# Patient Record
Sex: FEMALE | Race: WHITE | NOT HISPANIC OR LATINO | Employment: FULL TIME | URBAN - METROPOLITAN AREA
[De-identification: names, ages, dates, MRNs, and addresses within clinical notes are randomized per-mention and may not be internally consistent; named-entity substitution may affect disease eponyms.]

---

## 2017-01-12 DIAGNOSIS — Z48.298 AFTERCARE FOLLOWING ORGAN TRANSPLANT: ICD-10-CM

## 2017-01-12 DIAGNOSIS — Z79.899 ENCOUNTER FOR LONG-TERM CURRENT USE OF MEDICATION: ICD-10-CM

## 2017-01-12 DIAGNOSIS — Z94.0 KIDNEY REPLACED BY TRANSPLANT: ICD-10-CM

## 2017-01-12 LAB
ANION GAP SERPL CALCULATED.3IONS-SCNC: 6 MMOL/L (ref 3–14)
BUN SERPL-MCNC: 19 MG/DL (ref 7–30)
CALCIUM SERPL-MCNC: 9.6 MG/DL (ref 8.5–10.1)
CHLORIDE SERPL-SCNC: 108 MMOL/L (ref 94–109)
CO2 SERPL-SCNC: 26 MMOL/L (ref 20–32)
CREAT SERPL-MCNC: 1.13 MG/DL (ref 0.52–1.04)
CREAT UR-MCNC: 101 MG/DL
ERYTHROCYTE [DISTWIDTH] IN BLOOD BY AUTOMATED COUNT: 12.2 % (ref 10–15)
GFR SERPL CREATININE-BSD FRML MDRD: 49 ML/MIN/1.7M2
GLUCOSE SERPL-MCNC: 151 MG/DL (ref 70–99)
HCT VFR BLD AUTO: 42.5 % (ref 35–47)
HGB BLD-MCNC: 14.3 G/DL (ref 11.7–15.7)
MCH RBC QN AUTO: 33.9 PG (ref 26.5–33)
MCHC RBC AUTO-ENTMCNC: 33.6 G/DL (ref 31.5–36.5)
MCV RBC AUTO: 101 FL (ref 78–100)
PLATELET # BLD AUTO: 211 10E9/L (ref 150–450)
POTASSIUM SERPL-SCNC: 4.5 MMOL/L (ref 3.4–5.3)
PROT UR-MCNC: 0.15 G/L
PROT/CREAT 24H UR: 0.15 G/G CR (ref 0–0.2)
RBC # BLD AUTO: 4.22 10E12/L (ref 3.8–5.2)
SODIUM SERPL-SCNC: 140 MMOL/L (ref 133–144)
TACROLIMUS BLD-MCNC: 5.1 UG/L (ref 5–15)
TME LAST DOSE: 2300 H
WBC # BLD AUTO: 6.5 10E9/L (ref 4–11)

## 2017-01-12 PROCEDURE — 84156 ASSAY OF PROTEIN URINE: CPT | Performed by: INTERNAL MEDICINE

## 2017-01-12 PROCEDURE — 80197 ASSAY OF TACROLIMUS: CPT | Performed by: INTERNAL MEDICINE

## 2017-01-12 PROCEDURE — 36415 COLL VENOUS BLD VENIPUNCTURE: CPT | Performed by: INTERNAL MEDICINE

## 2017-01-12 PROCEDURE — 80048 BASIC METABOLIC PNL TOTAL CA: CPT | Performed by: INTERNAL MEDICINE

## 2017-01-12 PROCEDURE — 85027 COMPLETE CBC AUTOMATED: CPT | Performed by: INTERNAL MEDICINE

## 2017-01-17 DIAGNOSIS — Z94.0 KIDNEY REPLACED BY TRANSPLANT: ICD-10-CM

## 2017-01-17 DIAGNOSIS — Z94.0 KIDNEY TRANSPLANTED: Primary | ICD-10-CM

## 2017-01-17 RX ORDER — TACROLIMUS 0.5 MG/1
0.5 CAPSULE, GELATIN COATED ORAL 2 TIMES DAILY
Qty: 60 CAPSULE | Refills: 6 | Status: SHIPPED | OUTPATIENT
Start: 2017-01-17 | End: 2018-05-21

## 2017-01-17 RX ORDER — TACROLIMUS 1 MG/1
1 CAPSULE, GELATIN COATED ORAL 2 TIMES DAILY
Qty: 60 CAPSULE | Refills: 6 | Status: SHIPPED | OUTPATIENT
Start: 2017-01-17 | End: 2018-05-21

## 2017-01-17 RX ORDER — MYCOPHENOLATE MOFETIL 250 MG/1
500 CAPSULE ORAL 2 TIMES DAILY
Qty: 120 CAPSULE | Refills: 6 | Status: SHIPPED | OUTPATIENT
Start: 2017-01-17 | End: 2018-05-21

## 2017-02-23 ENCOUNTER — TELEPHONE (OUTPATIENT)
Dept: NEPHROLOGY | Facility: CLINIC | Age: 62
End: 2017-02-23

## 2017-03-23 ENCOUNTER — TELEPHONE (OUTPATIENT)
Dept: NEPHROLOGY | Facility: CLINIC | Age: 62
End: 2017-03-23

## 2017-03-23 NOTE — TELEPHONE ENCOUNTER
Ciara, this is   office from [facility] at the [facility]. We are calling to remind you of your upcoming Nephrology appointment on 4/7/17 at 740am. Please arrive about 1 hours prior to your appointment time for labs. Also, please bring an updated medication list or your labeled medication bottles with you to your appointment. If you have any questions or would like to cancel or reschedule your appointment, please call us at [phone].     You are welcome to have your labs done up to a week before your appointment at any [place] or [facility] facility. If you have your labs completed before your appointment, please still come 30 minutes early for check-in  DAVID CHÁVEZ CMA

## 2017-04-06 DIAGNOSIS — N18.9 CHRONIC KIDNEY DISEASE, UNSPECIFIED: ICD-10-CM

## 2017-04-06 RX ORDER — SIMVASTATIN 20 MG
20 TABLET ORAL EVERY EVENING
Qty: 90 TABLET | Refills: 0 | Status: SHIPPED | OUTPATIENT
Start: 2017-04-06 | End: 2017-10-31

## 2017-04-06 NOTE — TELEPHONE ENCOUNTER
Drug: Simvastatin  Last Fill Date: 2/22/2017  Quantity: 90    [facility]ika [facility]pe [facility]  Phone: [phone]

## 2017-04-06 NOTE — TELEPHONE ENCOUNTER
Last Office Visit with Nephrologist:  3/10/13. Follow up scheduled tomorrow.  Medication refilled per Nephrology Clinic protocol.     Lara Murphy RN

## 2017-04-07 ENCOUNTER — OFFICE VISIT (OUTPATIENT)
Dept: NEPHROLOGY | Facility: CLINIC | Age: 62
End: 2017-04-07
Attending: INTERNAL MEDICINE
Payer: COMMERCIAL

## 2017-04-07 VITALS
WEIGHT: 200.7 LBS | OXYGEN SATURATION: 96 % | DIASTOLIC BLOOD PRESSURE: 93 MMHG | TEMPERATURE: 94.3 F | SYSTOLIC BLOOD PRESSURE: 134 MMHG | HEART RATE: 54 BPM | BODY MASS INDEX: 31.5 KG/M2 | HEIGHT: 67 IN

## 2017-04-07 DIAGNOSIS — Z94.0 KIDNEY REPLACED BY TRANSPLANT: ICD-10-CM

## 2017-04-07 DIAGNOSIS — N18.30 CHRONIC KIDNEY DISEASE, STAGE 3 (MODERATE): Primary | ICD-10-CM

## 2017-04-07 DIAGNOSIS — Z79.899 ENCOUNTER FOR LONG-TERM CURRENT USE OF MEDICATION: ICD-10-CM

## 2017-04-07 DIAGNOSIS — Z94.0 LIVING-DONOR KIDNEY TRANSPLANT RECIPIENT: ICD-10-CM

## 2017-04-07 DIAGNOSIS — I10 ESSENTIAL HYPERTENSION: ICD-10-CM

## 2017-04-07 DIAGNOSIS — Z48.298 AFTERCARE FOLLOWING ORGAN TRANSPLANT: ICD-10-CM

## 2017-04-07 LAB
ANION GAP SERPL CALCULATED.3IONS-SCNC: 7 MMOL/L (ref 3–14)
BUN SERPL-MCNC: 18 MG/DL (ref 7–30)
CALCIUM SERPL-MCNC: 8.9 MG/DL (ref 8.5–10.1)
CHLORIDE SERPL-SCNC: 108 MMOL/L (ref 94–109)
CO2 SERPL-SCNC: 25 MMOL/L (ref 20–32)
CREAT SERPL-MCNC: 1.1 MG/DL (ref 0.52–1.04)
ERYTHROCYTE [DISTWIDTH] IN BLOOD BY AUTOMATED COUNT: 12.2 % (ref 10–15)
GFR SERPL CREATININE-BSD FRML MDRD: 50 ML/MIN/1.7M2
GLUCOSE SERPL-MCNC: 189 MG/DL (ref 70–99)
HCT VFR BLD AUTO: 40.9 % (ref 35–47)
HGB BLD-MCNC: 13.9 G/DL (ref 11.7–15.7)
MCH RBC QN AUTO: 34 PG (ref 26.5–33)
MCHC RBC AUTO-ENTMCNC: 34 G/DL (ref 31.5–36.5)
MCV RBC AUTO: 100 FL (ref 78–100)
PLATELET # BLD AUTO: 194 10E9/L (ref 150–450)
POTASSIUM SERPL-SCNC: 4.3 MMOL/L (ref 3.4–5.3)
RBC # BLD AUTO: 4.09 10E12/L (ref 3.8–5.2)
SODIUM SERPL-SCNC: 141 MMOL/L (ref 133–144)
TACROLIMUS BLD-MCNC: 5.5 UG/L (ref 5–15)
TME LAST DOSE: NORMAL H
URATE SERPL-MCNC: 7.1 MG/DL (ref 2.6–6)
WBC # BLD AUTO: 6 10E9/L (ref 4–11)

## 2017-04-07 PROCEDURE — 80197 ASSAY OF TACROLIMUS: CPT | Performed by: INTERNAL MEDICINE

## 2017-04-07 PROCEDURE — 99213 OFFICE O/P EST LOW 20 MIN: CPT | Mod: ZF

## 2017-04-07 PROCEDURE — 80048 BASIC METABOLIC PNL TOTAL CA: CPT | Performed by: INTERNAL MEDICINE

## 2017-04-07 PROCEDURE — 85027 COMPLETE CBC AUTOMATED: CPT | Performed by: INTERNAL MEDICINE

## 2017-04-07 PROCEDURE — 84550 ASSAY OF BLOOD/URIC ACID: CPT | Performed by: INTERNAL MEDICINE

## 2017-04-07 PROCEDURE — 36415 COLL VENOUS BLD VENIPUNCTURE: CPT | Performed by: INTERNAL MEDICINE

## 2017-04-07 RX ORDER — HYDROCHLOROTHIAZIDE 12.5 MG/1
12.5 CAPSULE ORAL DAILY
Qty: 30 CAPSULE | Refills: 11 | Status: SHIPPED | OUTPATIENT
Start: 2017-04-07 | End: 2020-12-07

## 2017-04-07 NOTE — PATIENT INSTRUCTIONS
Start HCTZ 12.5 mg daily for BP. Have blood pressure checked within one month to be sure less than 140/90. See Dr. Marie for diabetes. I would recommend starting metformin (your kidney function is good enough). Consider repeating bone scan.  Check blood test AT LEAST EVERY SIX MONTHS, prefer every four months.

## 2017-04-07 NOTE — MR AVS SNAPSHOT
After Visit Summary   4/7/2017    Eliana Elder    MRN: [id number]           Patient Information     Date Of Birth          1955        Visit Information        Provider Department      4/7/2017 7:40 AM Angel Ruiz MD [facility]        Today's Diagnoses     Chronic kidney disease, stage 3 (moderate)    -  1    Essential hypertension        Living-donor kidney transplant recipient          Care Instructions    Start HCTZ 12.5 mg daily for BP. Have blood pressure checked within one month to be sure less than 140/90. See Dr. Marie for diabetes. I would recommend starting metformin (your kidney function is good enough). Consider repeating bone scan.  Check blood test AT LEAST EVERY SIX MONTHS, prefer every four months.        Follow-ups after your visit        Follow-up notes from your care team     Return in about 1 year (around 4/7/2018).      Your next 10 appointments already scheduled     Mar 30, 2018  6:45 AM CDT   Lab with  LAB   [facility] ([facility])    [address] [place] [address]  [facility] [address] [phone]            Mar 30, 2018  7:40 AM CDT   (Arrive by 7:10 AM)   Return Kidney Transplant with Angel Ruiz MD   [facility] ([facility])    [address] [place] [address] [phone]              Who to contact     If you have questions or need follow up information about today's clinic visit or your schedule please contact [facility] NEPHR[facility] directly at [phone].  Normal or non-critical lab and imaging results will be communicated to you by MyChart, letter or phone within 4 business days after the clinic has received the results. If you do not hear from us within 7 days, please contact the clinic through MyChart or phone. If you have a critical or abnormal lab result, we will notify you by phone as soon as possible.  Submit refill requests through  "[employer] or call your pharmacy and they will forward the refill request to us. Please allow 3 business days for your refill to be completed.          Additional Information About Your Visit        MyChart Information     [employer] lets you send messages to your doctor, view your test results, renew your prescriptions, schedule appointments and more. To sign up, go to [email or web address] . Click on \"Log in\" on the left side of the screen, which will take you to the Welcome page. Then click on \"Sign up Now\" on the right side of the page.     You will be asked to enter the access code listed below, as well as some personal information. Please follow the directions to create your username and password.     Your access code is: [email or web address]  Expires: 2017  3:27 PM     Your access code will  in 90 days. If you need help or a new code, please call your [place] clinic or [phone].        Care EveryWhere ID     This is your Care EveryWhere ID. This could be used by other organizations to access your [facility] medical records  [id number]        Your Vitals Were     Pulse Temperature Height Pulse Oximetry BMI (Body Mass Index)       54 94.3  F (34.6  C) (Oral) 1.689 m (5' 6.5\") 96% 31.91 kg/m2        Blood Pressure from Last 3 Encounters:   17 (!) 134/93   13 136/84   12 125/81    Weight from Last 3 Encounters:   17 91 kg (200 lb 11.2 oz)   13 97 kg (213 lb 12.8 oz)              We Performed the Following     Uric acid          Today's Medication Changes          These changes are accurate as of: 17  8:46 AM.  If you have any questions, ask your nurse or doctor.               Start taking these medicines.        Dose/Directions    hydrochlorothiazide 12.5 MG capsule   Commonly known as:  MICROZIDE   Used for:  Essential hypertension   Started by:  Angel Ruiz MD        Dose:  12.5 mg   Take 1 capsule (12.5 mg) by mouth daily   Quantity:  30 capsule   Refills:  11          "   Where to get your medicines      These medications were sent to [id number] - [place], [place] - [facility] [address] AT [place] [address], [place] [address]     Phone:  [phone]     hydrochlorothiazide 12.5 MG capsule                Primary Care Provider Office Phone # Fax #    Angel Ruiz [phone]        [facility] [address] [facility] [address]  [facility] [address]        Thank you!     Thank you for choosing [facility]ROLOG[facility]  for your care. Our goal is always to provide you with excellent care. Hearing back from our patients is one way we can continue to improve our services. Please take a few minutes to complete the written survey that you may receive in the mail after your visit with us. Thank you!             Your Updated Medication List - Protect others around you: Learn how to safely use, store and throw away your medicines at www.disposemymeds.org.          This list is accurate as of: 4/7/17  8:46 AM.  Always use your most recent med list.                   Brand Name Dispense Instructions for use    ADVAIR DISKUS IN      Inhale 2 Inhalers into the lungs daily as needed.       allopurinol 100 MG tablet    ZYLOPRIM    60 tablet    Take 2 tablets (200 mg) by mouth daily       BONIVA 3 MG/3ML injection   Generic drug:  IBANdronate      Inject 3 mg into the vein. Once in 3 months       CALCIUM 1000 + D PO      Take 1 tablet by mouth daily Reported on 4/7/2017       DULERA 200-5 MCG/ACT oral inhaler   Generic drug:  mometasone-formoterol      Inhale 1 puff into the lungs 2 times daily Reported on 4/7/2017       fluticasone 27.5 MCG/SPRAY spray    VERAMYST     Spray 2 sprays into both nostrils daily       hydrochlorothiazide 12.5 MG capsule    MICROZIDE    30 capsule    Take 1 capsule (12.5 mg) by mouth daily       metoprolol 25 MG tablet    LOPRESSOR    60 tablet    Take 1 tablet (25 mg) by mouth 2 times daily        mycophenolate capsule     120 capsule    Take 2 capsules (500 mg) by mouth 2 times daily GENERIC       NASONEX 50 MCG/ACT spray   Generic drug:  mometasone      Spray 2 sprays into both nostrils daily Reported on 4/7/2017       pantoprazole 20 MG EC tablet    PROTONIX    90 tablet    Take 1 tablet (20 mg) by mouth daily       predniSONE 5 MG tablet    DELTASONE    30 tablet    Take 1 tablet (5 mg) by mouth daily       simvastatin 20 MG tablet    ZOCOR    90 tablet    Take 1 tablet (20 mg) by mouth every evening       * tacrolimus capsule     60 capsule    Take 1 capsule (1 mg) by mouth 2 times daily (total dose 1.5 mg twice daily) GENERIC       * tacrolimus capsule     60 capsule    Take 1 capsule (0.5 mg) by mouth 2 times daily (total dose 1.5 mg twice daily) GENERIC       VENTOLIN IN      Inhale 1 Inhaler into the lungs daily as needed.       * Notice:  This list has 2 medication(s) that are the same as other medications prescribed for you. Read the directions carefully, and ask your doctor or other care provider to review them with you.

## 2017-04-07 NOTE — LETTER
4/7/2017      RE: Eliana MARCELINO Nochem  [address]   [place] [address]       Assessment and Plan:     1. ESRD of unclear etiology (no biopsy and presented near end stage) s/p LDKTx in 2007 with baseline Cr of 1.4 and today 1.1 No issues since last visit. IS with pred, MMF and tacrolimus. Levels acceptable.    -No changes in IS regimen   -Patient needs to have labs checked every four months, at least every 6 months.  2. Gout: not taking allopurinol as prescribed. However, no recent gout and uric acid is 7. Encuoraged to take 100 mg daily.  3. HTN:  Not at goal of 140/9 or better.  Continue metoprolol. Add HCTZ 12.5 mg daily.  4. BMD: encouraged to get  Repeat bone scan with PCP.  5. Diabetes (new problem): Had elevated Hgb A1C in past and many elevated fasting glucose. Is obese and on tacrolimus. Encouraged to lose more weight. Referred back to Dr. Marie (her PCP) for treatment. Metformin would be appropriate as her GFR is high enough.    Assessment and plan was discussed with patient and she voiced her understanding and agreement.    Reason for Visit:  62 yo F with ESRD of unknown etiology s/p LDKT in 2007 and gout who presents for follow up, doing well.     HPI:  I have not seen patient for 3 years. She is doing well with no current concerns or complaints. Last fall she had bronchitis treated with azithromycin. Two years ago fell, fractured right patella, pinned, now well. No recent episodes of gout.  Has not been taking allopurinol regularly; does not like the taste. Takes at most 100 mg 3x/week. Is up to date on health maintenance except colonoscopy. No skin lesions or sun burn. Weight is high, but 12 lbs less than 3 y ago. No other issues; tolerating IS regimen. Still working. Is stressed by difficulties with  who has Parkinsons, daughter who has moved back. They have moved to [facility] closer to her work and all on one floor for . Still working same job.    Baseline Cr:  1.4-1.5    ROS:  A comprehensive review of systems was obtained and negative, except as noted in the HPI or PMH.    Active Medical Problems:  Patient Active Problem List   Diagnosis     Chronic kidney disease     Essential hypertension     High risk medication use     Living-donor kidney transplant recipient     Reactive airway disease     Gout     Hyperlipidemia       Personal Hx:   Social History     Social History     Marital status:      Spouse name: N/A     Number of children: N/A     Years of education: N/A     Occupational History     Not on file.     Social History Main Topics     Smoking status: Never Smoker     Smokeless tobacco: Never Used     Alcohol use Not on file     Drug use: Not on file     Sexual activity: Not on file     Other Topics Concern     Not on file     Social History Narrative     No narrative on file       Allergies:  No Known Allergies    Medications:  Prior to Admission medications    Medication Sig Start Date End Date Taking? Authorizing Provider   mometasone-formoterol (DULERA) 200-5 MCG/ACT oral inhaler Inhale 1 puff into the lungs 2 times daily.   Yes Reported, Patient   Calcium Carb-Cholecalciferol (CALCIUM 1000 + D PO) Take 1 tablet by mouth daily.    Reported, Patient   metoprolol (LOPRESSOR) 25 MG tablet Take 1 tablet by mouth 2 times daily. 12/18/12   Angel Ruiz MD   predniSONE (DELTASONE) 5 MG tablet Take 1 tablet by mouth daily. 12/18/12   Jarrod Arellano MD   Furosemide (LASIX PO) Take 1 tablet by mouth every other day. Pt is unsure of dose.    Reported, Patient   SIMVASTATIN PO Take 1 tablet by mouth daily.    Reported, Patient   Albuterol (VENTOLIN IN) Inhale 1 Inhaler into the lungs daily as needed.    Reported, Patient   Fluticasone-Salmeterol (ADVAIR DISKUS IN) Inhale 2 Inhalers into the lungs daily as needed.    Reported, Patient   mometasone (NASONEX) 50 MCG/ACT nasal spray Spray 2 sprays into both nostrils daily.    Reported, Patient   allopurinol  "(ZYLOPRIM) 100 MG tablet Take 2 tablets by mouth daily. 7/2/12   Angel Ruiz MD   pantoprazole (PROTONIX) 20 MG tablet Take 1 tablet by mouth daily. 7/2/12   Angel Ruiz MD   CELLCEPT 250 MG capsule Take 2 capsules by mouth 2 times daily. 6/29/12   Jarrod Arellano MD   PROGRAF 1 MG capsule Take 1 capsule by mouth 2 times daily. 5/25/12   Jarrod Arellano MD   PROGRAF 0.5 MG capsule Take 1 capsule by mouth 2 times daily. Total dose 1.5 MG twice daily. 2/24/12   Angel Ruiz MD   IBANdronate (BONIVA) 3 MG/3ML injection Inject 3 mg into the vein. Once in 3 months    Reported, Patient       Vitals:  BP (!) 134/93  Pulse 54  Temp 94.3  F (34.6  C) (Oral)  Ht 1.689 m (5' 6.5\")  Wt 91 kg (200 lb 11.2 oz)  SpO2 96%  BMI 31.91 kg/m2    Exam:  GENERAL APPEARANCE: alert and no distress  HENT: mouth without ulcers or lesions  RESP: lungs clear to auscultation - no rales, rhonchi or wheezes  CV: regular rhythm, normal rate, no rub, no murmur  EDEMA: no LE edema bilaterally  ABDOMEN:  soft, nontender, no HSM or masses and bowel sounds normal, no tenderness over the kidney allograft.  MS: extremities normal- no gross deformities noted, no evidence of inflammation in joints, no muscle tenderness  SKIN: no lesions  No cervical adenopathy    Results:  Recent Results (from the past 336 hour(s))   CBC with platelets    Collection Time: 04/07/17  7:31 AM   Result Value Ref Range    WBC 6.0 4.0 - 11.0 10e9/L    RBC Count 4.09 3.8 - 5.2 10e12/L    Hemoglobin 13.9 11.7 - 15.7 g/dL    Hematocrit 40.9 35.0 - 47.0 %     78 - 100 fl    MCH 34.0 (H) 26.5 - 33.0 pg    MCHC 34.0 31.5 - 36.5 g/dL    RDW 12.2 10.0 - 15.0 %    Platelet Count 194 150 - 450 10e9/L   Basic metabolic panel    Collection Time: 04/07/17  7:31 AM   Result Value Ref Range    Sodium 141 133 - 144 mmol/L    Potassium 4.3 3.4 - 5.3 mmol/L    Chloride 108 94 - 109 mmol/L    Carbon Dioxide 25 20 - 32 mmol/L    Anion Gap 7 3 - 14 mmol/L    Glucose 189 (H) 70 " - 99 mg/dL    Urea Nitrogen 18 7 - 30 mg/dL    Creatinine 1.10 (H) 0.52 - 1.04 mg/dL    GFR Estimate 50 (L) >60 mL/min/1.7m2    GFR Estimate If Black 61 >60 mL/min/1.7m2    Calcium 8.9 8.5 - 10.1 mg/dL   Uric acid    Collection Time: 04/07/17  7:31 AM   Result Value Ref Range    Uric Acid 7.1 (H) 2.6 - 6.0 mg/dL             Angel Ruiz MD

## 2017-04-07 NOTE — LETTER
Date:April 11, 2017      Patient was self referred, no letter generated. Do not send.        [facility] Health Information

## 2017-04-07 NOTE — LETTER
4/7/2017       RE: Eliana Elder  [address]   [place] [address]     Dear Colleague,    Thank you for referring your patient, Eliana Elder, to the [facility] NEPHROLOGY at [facility]. Please see a copy of my visit note below.    Assessment and Plan:     1. ESRD of unclear etiology (no biopsy and presented near end stage) s/p LDKTx in 2007 with baseline Cr of 1.4 and today 1.1 No issues since last visit. IS with pred, MMF and tacrolimus. Levels acceptable.    -No changes in IS regimen   -Patient needs to have labs checked every four months, at least every 6 months.  2. Gout: not taking allopurinol as prescribed. However, no recent gout and uric acid is 7. Encuoraged to take 100 mg daily.  3. HTN:  Not at goal of 140/9 or better.  Continue metoprolol. Add HCTZ 12.5 mg daily.  4. BMD: encouraged to get  Repeat bone scan with PCP.  5. Diabetes (new problem): Had elevated Hgb A1C in past and many elevated fasting glucose. Is obese and on tacrolimus. Encouraged to lose more weight. Referred back to Dr. Marie (her PCP) for treatment. Metformin would be appropriate as her GFR is high enough.    Assessment and plan was discussed with patient and she voiced her understanding and agreement.    Reason for Visit:  62 yo F with ESRD of unknown etiology s/p LDKT in 2007 and gout who presents for follow up, doing well.     HPI:  I have not seen patient for 3 years. She is doing well with no current concerns or complaints. Last fall she had bronchitis treated with azithromycin. Two years ago fell, fractured right patella, pinned, now well. No recent episodes of gout.  Has not been taking allopurinol regularly; does not like the taste. Takes at most 100 mg 3x/week. Is up to date on health maintenance except colonoscopy. No skin lesions or sun burn. Weight is high, but 12 lbs less than 3 y ago. No other issues; tolerating IS regimen. Still working. Is stressed by  difficulties with  who has Parkinsons, daughter who has moved back. They have moved to [facility] closer to her work and all on one floor for . Still working same job.    Baseline Cr: 1.4-1.5    ROS:  A comprehensive review of systems was obtained and negative, except as noted in the HPI or PMH.    Active Medical Problems:  Patient Active Problem List   Diagnosis     Chronic kidney disease     Essential hypertension     High risk medication use     Living-donor kidney transplant recipient     Reactive airway disease     Gout     Hyperlipidemia       Personal Hx:   Social History     Social History     Marital status:      Spouse name: N/A     Number of children: N/A     Years of education: N/A     Occupational History     Not on file.     Social History Main Topics     Smoking status: Never Smoker     Smokeless tobacco: Never Used     Alcohol use Not on file     Drug use: Not on file     Sexual activity: Not on file     Other Topics Concern     Not on file     Social History Narrative     No narrative on file       Allergies:  No Known Allergies    Medications:  Prior to Admission medications    Medication Sig Start Date End Date Taking? Authorizing Provider   mometasone-formoterol (DULERA) 200-5 MCG/ACT oral inhaler Inhale 1 puff into the lungs 2 times daily.   Yes Reported, Patient   Calcium Carb-Cholecalciferol (CALCIUM 1000 + D PO) Take 1 tablet by mouth daily.    Reported, Patient   metoprolol (LOPRESSOR) 25 MG tablet Take 1 tablet by mouth 2 times daily. 12/18/12   Angel Ruiz MD   predniSONE (DELTASONE) 5 MG tablet Take 1 tablet by mouth daily. 12/18/12   Jarrod Arellano MD   Furosemide (LASIX PO) Take 1 tablet by mouth every other day. Pt is unsure of dose.    Reported, Patient   SIMVASTATIN PO Take 1 tablet by mouth daily.    Reported, Patient   Albuterol (VENTOLIN IN) Inhale 1 Inhaler into the lungs daily as needed.    Reported, Patient   Fluticasone-Salmeterol (ADVAIR DISKUS IN)  "Inhale 2 Inhalers into the lungs daily as needed.    Reported, Patient   mometasone (NASONEX) 50 MCG/ACT nasal spray Spray 2 sprays into both nostrils daily.    Reported, Patient   allopurinol (ZYLOPRIM) 100 MG tablet Take 2 tablets by mouth daily. 7/2/12   Angel Ruiz MD   pantoprazole (PROTONIX) 20 MG tablet Take 1 tablet by mouth daily. 7/2/12   Angel Ruiz MD   CELLCEPT 250 MG capsule Take 2 capsules by mouth 2 times daily. 6/29/12   Jarrod Arellano MD   PROGRAF 1 MG capsule Take 1 capsule by mouth 2 times daily. 5/25/12   Jarrod Arellano MD   PROGRAF 0.5 MG capsule Take 1 capsule by mouth 2 times daily. Total dose 1.5 MG twice daily. 2/24/12   Angel Ruiz MD   IBANdronate (BONIVA) 3 MG/3ML injection Inject 3 mg into the vein. Once in 3 months    Reported, Patient       Vitals:  BP (!) 134/93  Pulse 54  Temp 94.3  F (34.6  C) (Oral)  Ht 1.689 m (5' 6.5\")  Wt 91 kg (200 lb 11.2 oz)  SpO2 96%  BMI 31.91 kg/m2    Exam:  GENERAL APPEARANCE: alert and no distress  HENT: mouth without ulcers or lesions  RESP: lungs clear to auscultation - no rales, rhonchi or wheezes  CV: regular rhythm, normal rate, no rub, no murmur  EDEMA: no LE edema bilaterally  ABDOMEN:  soft, nontender, no HSM or masses and bowel sounds normal, no tenderness over the kidney allograft.  MS: extremities normal- no gross deformities noted, no evidence of inflammation in joints, no muscle tenderness  SKIN: no lesions  No cervical adenopathy    Results:  Recent Results (from the past 336 hour(s))   CBC with platelets    Collection Time: 04/07/17  7:31 AM   Result Value Ref Range    WBC 6.0 4.0 - 11.0 10e9/L    RBC Count 4.09 3.8 - 5.2 10e12/L    Hemoglobin 13.9 11.7 - 15.7 g/dL    Hematocrit 40.9 35.0 - 47.0 %     78 - 100 fl    MCH 34.0 (H) 26.5 - 33.0 pg    MCHC 34.0 31.5 - 36.5 g/dL    RDW 12.2 10.0 - 15.0 %    Platelet Count 194 150 - 450 10e9/L   Basic metabolic panel    Collection Time: 04/07/17  7:31 AM   Result Value " Ref Range    Sodium 141 133 - 144 mmol/L    Potassium 4.3 3.4 - 5.3 mmol/L    Chloride 108 94 - 109 mmol/L    Carbon Dioxide 25 20 - 32 mmol/L    Anion Gap 7 3 - 14 mmol/L    Glucose 189 (H) 70 - 99 mg/dL    Urea Nitrogen 18 7 - 30 mg/dL    Creatinine 1.10 (H) 0.52 - 1.04 mg/dL    GFR Estimate 50 (L) >60 mL/min/1.7m2    GFR Estimate If Black 61 >60 mL/min/1.7m2    Calcium 8.9 8.5 - 10.1 mg/dL   Uric acid    Collection Time: 04/07/17  7:31 AM   Result Value Ref Range    Uric Acid 7.1 (H) 2.6 - 6.0 mg/dL             Again, thank you for allowing me to participate in the care of your patient.      Sincerely,    Angel Ruiz MD

## 2017-04-07 NOTE — LETTER
4/7/2017      RE: Eliana MARCELINO Nochem  [address]   [place] [address]       Assessment and Plan:     1. ESRD of unclear etiology (no biopsy and presented near end stage) s/p LDKTx in 2007 with baseline Cr of 1.4 and today 1.1 No issues since last visit. IS with pred, MMF and tacrolimus. Levels acceptable.    -No changes in IS regimen   -Patient needs to have labs checked every four months, at least every 6 months.  2. Gout: not taking allopurinol as prescribed. However, no recent gout and uric acid is 7. Encuoraged to take 100 mg daily.  3. HTN:  Not at goal of 140/9 or better.  Continue metoprolol. Add HCTZ 12.5 mg daily.  4. BMD: encouraged to get  Repeat bone scan with PCP.  5. Diabetes (new problem): Had elevated Hgb A1C in past and many elevated fasting glucose. Is obese and on tacrolimus. Encouraged to lose more weight. Referred back to Dr. Marie (her PCP) for treatment. Metformin would be appropriate as her GFR is high enough.    Assessment and plan was discussed with patient and she voiced her understanding and agreement.    Reason for Visit:  60 yo F with ESRD of unknown etiology s/p LDKT in 2007 and gout who presents for follow up, doing well.     HPI:  I have not seen patient for 3 years. She is doing well with no current concerns or complaints. Last fall she had bronchitis treated with azithromycin. Two years ago fell, fractured right patella, pinned, now well. No recent episodes of gout.  Has not been taking allopurinol regularly; does not like the taste. Takes at most 100 mg 3x/week. Is up to date on health maintenance except colonoscopy. No skin lesions or sun burn. Weight is high, but 12 lbs less than 3 y ago. No other issues; tolerating IS regimen. Still working. Is stressed by difficulties with  who has Parkinsons, daughter who has moved back. They have moved to [facility] closer to her work and all on one floor for . Still working same job.    Baseline Cr:  1.4-1.5    ROS:  A comprehensive review of systems was obtained and negative, except as noted in the HPI or PMH.    Active Medical Problems:  Patient Active Problem List   Diagnosis     Chronic kidney disease     Essential hypertension     High risk medication use     Living-donor kidney transplant recipient     Reactive airway disease     Gout     Hyperlipidemia       Personal Hx:   Social History     Social History     Marital status:      Spouse name: N/A     Number of children: N/A     Years of education: N/A     Occupational History     Not on file.     Social History Main Topics     Smoking status: Never Smoker     Smokeless tobacco: Never Used     Alcohol use Not on file     Drug use: Not on file     Sexual activity: Not on file     Other Topics Concern     Not on file     Social History Narrative     No narrative on file       Allergies:  No Known Allergies    Medications:  Prior to Admission medications    Medication Sig Start Date End Date Taking? Authorizing Provider   mometasone-formoterol (DULERA) 200-5 MCG/ACT oral inhaler Inhale 1 puff into the lungs 2 times daily.   Yes Reported, Patient   Calcium Carb-Cholecalciferol (CALCIUM 1000 + D PO) Take 1 tablet by mouth daily.    Reported, Patient   metoprolol (LOPRESSOR) 25 MG tablet Take 1 tablet by mouth 2 times daily. 12/18/12   Angel Ruiz MD   predniSONE (DELTASONE) 5 MG tablet Take 1 tablet by mouth daily. 12/18/12   Jarrod Arellano MD   Furosemide (LASIX PO) Take 1 tablet by mouth every other day. Pt is unsure of dose.    Reported, Patient   SIMVASTATIN PO Take 1 tablet by mouth daily.    Reported, Patient   Albuterol (VENTOLIN IN) Inhale 1 Inhaler into the lungs daily as needed.    Reported, Patient   Fluticasone-Salmeterol (ADVAIR DISKUS IN) Inhale 2 Inhalers into the lungs daily as needed.    Reported, Patient   mometasone (NASONEX) 50 MCG/ACT nasal spray Spray 2 sprays into both nostrils daily.    Reported, Patient   allopurinol  "(ZYLOPRIM) 100 MG tablet Take 2 tablets by mouth daily. 7/2/12   Angel Ruiz MD   pantoprazole (PROTONIX) 20 MG tablet Take 1 tablet by mouth daily. 7/2/12   Angel Ruiz MD   CELLCEPT 250 MG capsule Take 2 capsules by mouth 2 times daily. 6/29/12   Jarrod Arellano MD   PROGRAF 1 MG capsule Take 1 capsule by mouth 2 times daily. 5/25/12   Jarrod Arellano MD   PROGRAF 0.5 MG capsule Take 1 capsule by mouth 2 times daily. Total dose 1.5 MG twice daily. 2/24/12   Angel Ruiz MD   IBANdronate (BONIVA) 3 MG/3ML injection Inject 3 mg into the vein. Once in 3 months    Reported, Patient       Vitals:  BP (!) 134/93  Pulse 54  Temp 94.3  F (34.6  C) (Oral)  Ht 1.689 m (5' 6.5\")  Wt 91 kg (200 lb 11.2 oz)  SpO2 96%  BMI 31.91 kg/m2    Exam:  GENERAL APPEARANCE: alert and no distress  HENT: mouth without ulcers or lesions  RESP: lungs clear to auscultation - no rales, rhonchi or wheezes  CV: regular rhythm, normal rate, no rub, no murmur  EDEMA: no LE edema bilaterally  ABDOMEN:  soft, nontender, no HSM or masses and bowel sounds normal, no tenderness over the kidney allograft.  MS: extremities normal- no gross deformities noted, no evidence of inflammation in joints, no muscle tenderness  SKIN: no lesions  No cervical adenopathy    Results:  Recent Results (from the past 336 hour(s))   CBC with platelets    Collection Time: 04/07/17  7:31 AM   Result Value Ref Range    WBC 6.0 4.0 - 11.0 10e9/L    RBC Count 4.09 3.8 - 5.2 10e12/L    Hemoglobin 13.9 11.7 - 15.7 g/dL    Hematocrit 40.9 35.0 - 47.0 %     78 - 100 fl    MCH 34.0 (H) 26.5 - 33.0 pg    MCHC 34.0 31.5 - 36.5 g/dL    RDW 12.2 10.0 - 15.0 %    Platelet Count 194 150 - 450 10e9/L   Basic metabolic panel    Collection Time: 04/07/17  7:31 AM   Result Value Ref Range    Sodium 141 133 - 144 mmol/L    Potassium 4.3 3.4 - 5.3 mmol/L    Chloride 108 94 - 109 mmol/L    Carbon Dioxide 25 20 - 32 mmol/L    Anion Gap 7 3 - 14 mmol/L    Glucose 189 (H) 70 " - 99 mg/dL    Urea Nitrogen 18 7 - 30 mg/dL    Creatinine 1.10 (H) 0.52 - 1.04 mg/dL    GFR Estimate 50 (L) >60 mL/min/1.7m2    GFR Estimate If Black 61 >60 mL/min/1.7m2    Calcium 8.9 8.5 - 10.1 mg/dL   Uric acid    Collection Time: 04/07/17  7:31 AM   Result Value Ref Range    Uric Acid 7.1 (H) 2.6 - 6.0 mg/dL             Angel Ruiz MD

## 2017-04-07 NOTE — PROGRESS NOTES
Assessment and Plan:     1. ESRD of unclear etiology (no biopsy and presented near end stage) s/p LDKTx in 2007 with baseline Cr of 1.4 and today 1.1 No issues since last visit. IS with pred, MMF and tacrolimus. Levels acceptable.    -No changes in IS regimen   -Patient needs to have labs checked every four months, at least every 6 months.  2. Gout: not taking allopurinol as prescribed. However, no recent gout and uric acid is 7. Encuoraged to take 100 mg daily.  3. HTN:  Not at goal of 140/9 or better.  Continue metoprolol. Add HCTZ 12.5 mg daily.  4. BMD: encouraged to get  Repeat bone scan with PCP.  5. Diabetes (new problem): Had elevated Hgb A1C in past and many elevated fasting glucose. Is obese and on tacrolimus. Encouraged to lose more weight. Referred back to Dr. Marie (her PCP) for treatment. Metformin would be appropriate as her GFR is high enough.    Assessment and plan was discussed with patient and she voiced her understanding and agreement.    Reason for Visit:  60 yo F with ESRD of unknown etiology s/p LDKT in 2007 and gout who presents for follow up, doing well.     HPI:  I have not seen patient for 3 years. She is doing well with no current concerns or complaints. Last fall she had bronchitis treated with azithromycin. Two years ago fell, fractured right patella, pinned, now well. No recent episodes of gout.  Has not been taking allopurinol regularly; does not like the taste. Takes at most 100 mg 3x/week. Is up to date on health maintenance except colonoscopy. No skin lesions or sun burn. Weight is high, but 12 lbs less than 3 y ago. No other issues; tolerating IS regimen. Still working. Is stressed by difficulties with  who has Parkinsons, daughter who has moved back. They have moved to [facility] closer to her work and all on one floor for . Still working same job.    Baseline Cr: 1.4-1.5    ROS:  A comprehensive review of systems was obtained and negative, except as noted in the  HPI or PMH.    Active Medical Problems:  Patient Active Problem List   Diagnosis     Chronic kidney disease     Essential hypertension     High risk medication use     Living-donor kidney transplant recipient     Reactive airway disease     Gout     Hyperlipidemia       Personal Hx:   Social History     Social History     Marital status:      Spouse name: N/A     Number of children: N/A     Years of education: N/A     Occupational History     Not on file.     Social History Main Topics     Smoking status: Never Smoker     Smokeless tobacco: Never Used     Alcohol use Not on file     Drug use: Not on file     Sexual activity: Not on file     Other Topics Concern     Not on file     Social History Narrative     No narrative on file     Family Hx:  No CKD    Allergies:  No Known Allergies    Medications:  Prior to Admission medications    Medication Sig Start Date End Date Taking? Authorizing Provider   mometasone-formoterol (DULERA) 200-5 MCG/ACT oral inhaler Inhale 1 puff into the lungs 2 times daily.   Yes Reported, Patient   Calcium Carb-Cholecalciferol (CALCIUM 1000 + D PO) Take 1 tablet by mouth daily.    Reported, Patient   metoprolol (LOPRESSOR) 25 MG tablet Take 1 tablet by mouth 2 times daily. 12/18/12   Angel Ruiz MD   predniSONE (DELTASONE) 5 MG tablet Take 1 tablet by mouth daily. 12/18/12   Jarrod Arellano MD   Furosemide (LASIX PO) Take 1 tablet by mouth every other day. Pt is unsure of dose.    Reported, Patient   SIMVASTATIN PO Take 1 tablet by mouth daily.    Reported, Patient   Albuterol (VENTOLIN IN) Inhale 1 Inhaler into the lungs daily as needed.    Reported, Patient   Fluticasone-Salmeterol (ADVAIR DISKUS IN) Inhale 2 Inhalers into the lungs daily as needed.    Reported, Patient   mometasone (NASONEX) 50 MCG/ACT nasal spray Spray 2 sprays into both nostrils daily.    Reported, Patient   allopurinol (ZYLOPRIM) 100 MG tablet Take 2 tablets by mouth daily. 7/2/12   Angel Ruiz MD  "  pantoprazole (PROTONIX) 20 MG tablet Take 1 tablet by mouth daily. 7/2/12   Angel Ruiz MD   CELLCEPT 250 MG capsule Take 2 capsules by mouth 2 times daily. 6/29/12   Jarrod Arellano MD   PROGRAF 1 MG capsule Take 1 capsule by mouth 2 times daily. 5/25/12   Jarrod Arellano MD   PROGRAF 0.5 MG capsule Take 1 capsule by mouth 2 times daily. Total dose 1.5 MG twice daily. 2/24/12   Angel Ruiz MD   IBANdronate (BONIVA) 3 MG/3ML injection Inject 3 mg into the vein. Once in 3 months    Reported, Patient       Vitals:  BP (!) 134/93  Pulse 54  Temp 94.3  F (34.6  C) (Oral)  Ht 1.689 m (5' 6.5\")  Wt 91 kg (200 lb 11.2 oz)  SpO2 96%  BMI 31.91 kg/m2    Exam:  GENERAL APPEARANCE: alert and no distress  HENT: mouth without ulcers or lesions  RESP: lungs clear to auscultation - no rales, rhonchi or wheezes  CV: regular rhythm, normal rate, no rub, no murmur  EDEMA: no LE edema bilaterally  ABDOMEN:  soft, nontender, no HSM or masses and bowel sounds normal, no tenderness over the kidney allograft.  MS: extremities normal- no gross deformities noted, no evidence of inflammation in joints, no muscle tenderness  SKIN: no lesions  No cervical adenopathy    Results:  Recent Results (from the past 336 hour(s))   CBC with platelets    Collection Time: 04/07/17  7:31 AM   Result Value Ref Range    WBC 6.0 4.0 - 11.0 10e9/L    RBC Count 4.09 3.8 - 5.2 10e12/L    Hemoglobin 13.9 11.7 - 15.7 g/dL    Hematocrit 40.9 35.0 - 47.0 %     78 - 100 fl    MCH 34.0 (H) 26.5 - 33.0 pg    MCHC 34.0 31.5 - 36.5 g/dL    RDW 12.2 10.0 - 15.0 %    Platelet Count 194 150 - 450 10e9/L   Basic metabolic panel    Collection Time: 04/07/17  7:31 AM   Result Value Ref Range    Sodium 141 133 - 144 mmol/L    Potassium 4.3 3.4 - 5.3 mmol/L    Chloride 108 94 - 109 mmol/L    Carbon Dioxide 25 20 - 32 mmol/L    Anion Gap 7 3 - 14 mmol/L    Glucose 189 (H) 70 - 99 mg/dL    Urea Nitrogen 18 7 - 30 mg/dL    Creatinine 1.10 (H) 0.52 - 1.04 mg/dL "    GFR Estimate 50 (L) >60 mL/min/1.7m2    GFR Estimate If Black 61 >60 mL/min/1.7m2    Calcium 8.9 8.5 - 10.1 mg/dL   Uric acid    Collection Time: 04/07/17  7:31 AM   Result Value Ref Range    Uric Acid 7.1 (H) 2.6 - 6.0 mg/dL

## 2017-04-07 NOTE — NURSING NOTE
"Chief Complaint   Patient presents with     RECHECK     Follow up kidney transplant and medication check.       Initial BP (!) 134/93  Pulse 54  Temp 94.3  F (34.6  C) (Oral)  Ht 1.689 m (5' 6.5\")  Wt 91 kg (200 lb 11.2 oz)  SpO2 96%  BMI 31.91 kg/m2 Estimated body mass index is 31.91 kg/(m^2) as calculated from the following:    Height as of this encounter: 1.689 m (5' 6.5\").    Weight as of this encounter: 91 kg (200 lb 11.2 oz).  Medication Reconciliation: complete   Amy Leonard. CMA    "

## 2017-07-06 ENCOUNTER — TELEPHONE (OUTPATIENT)
Dept: NEPHROLOGY | Facility: CLINIC | Age: 62
End: 2017-07-06

## 2017-07-06 DIAGNOSIS — Z94.0 KIDNEY REPLACED BY TRANSPLANT: Primary | ICD-10-CM

## 2017-07-06 NOTE — TELEPHONE ENCOUNTER
Patient calling to discuss that her pharmacy no longer is filling her pantoprazole due to her insurance no longer covering it. She is not taking omeprazole OTC 20mg once per day at night, and is wondering if this OK with Dr. Whiting and if he could write an Rx, and if so please use [facility] off [address].     Call work office number at [phone] ok to leave detailed VM

## 2017-07-17 ENCOUNTER — TELEPHONE (OUTPATIENT)
Dept: NEPHROLOGY | Facility: CLINIC | Age: 62
End: 2017-07-17

## 2017-07-17 NOTE — TELEPHONE ENCOUNTER
Prior Authorization Retail Medication Request  Medication/Dose: Omeprazole  Diagnosis and ICD code: Kidney replaced by transplant [Z94.0]  New/Renewal/Insurance Change PA: New  Previously Tried and Failed Therapies: See chart    Insurance ID (if provided): [id number]   Insurance Phone (if provided): [phone]    Any additional info from fax request:     If you received a fax notification from an outside Pharmacy:  Pharmacy Name:[facility]  Pharmacy #:[phone]  Pharmacy Fax:[phone]    Lara Murphy RN

## 2017-07-18 NOTE — TELEPHONE ENCOUNTER
PA Initiation    Medication: Omeprazole  Insurance Company: [employer] - Phone [phone] Fax [phone]  Pharmacy Filling the Rx: [facility] - [address] [facility] [address] [address] [facility]  Filling Pharmacy Phone: [phone]  Filling Pharmacy Fax: [phone]  Start Date: 7/18/2017

## 2017-08-04 DIAGNOSIS — Z94.0 KIDNEY TRANSPLANTED: ICD-10-CM

## 2017-08-04 NOTE — TELEPHONE ENCOUNTER
Drug Name: prednisone 5mg  Last Fill Date: 6/20/17  Quantity: 30    Porsha Sky   [facility]  [phone]

## 2017-08-05 RX ORDER — PREDNISONE 5 MG/1
5 TABLET ORAL DAILY
Qty: 30 TABLET | Refills: 6 | Status: SHIPPED | OUTPATIENT
Start: 2017-08-05 | End: 2018-09-04

## 2017-08-29 ENCOUNTER — TELEPHONE (OUTPATIENT)
Dept: TRANSPLANT | Facility: CLINIC | Age: 62
End: 2017-08-29

## 2017-08-29 DIAGNOSIS — Z79.899 ENCOUNTER FOR LONG-TERM CURRENT USE OF MEDICATION: ICD-10-CM

## 2017-08-29 DIAGNOSIS — Z94.0 KIDNEY REPLACED BY TRANSPLANT: Primary | ICD-10-CM

## 2017-08-29 DIAGNOSIS — Z48.298 AFTERCARE FOLLOWING ORGAN TRANSPLANT: ICD-10-CM

## 2017-08-29 NOTE — TELEPHONE ENCOUNTER
Eliana needs updated lab orders in [facility].  TATIANA Hernandez called, Eliana is coming into have labs drawn tomorrow 08/30/2017.

## 2017-08-30 DIAGNOSIS — Z48.298 AFTERCARE FOLLOWING ORGAN TRANSPLANT: ICD-10-CM

## 2017-08-30 DIAGNOSIS — Z94.0 KIDNEY REPLACED BY TRANSPLANT: ICD-10-CM

## 2017-08-30 DIAGNOSIS — Z79.899 ENCOUNTER FOR LONG-TERM CURRENT USE OF MEDICATION: ICD-10-CM

## 2017-08-30 LAB
ANION GAP SERPL CALCULATED.3IONS-SCNC: 7 MMOL/L (ref 3–14)
BUN SERPL-MCNC: 22 MG/DL (ref 7–30)
CALCIUM SERPL-MCNC: 9.4 MG/DL (ref 8.5–10.1)
CHLORIDE SERPL-SCNC: 106 MMOL/L (ref 94–109)
CO2 SERPL-SCNC: 25 MMOL/L (ref 20–32)
CREAT SERPL-MCNC: 1.16 MG/DL (ref 0.52–1.04)
CREAT UR-MCNC: 141 MG/DL
ERYTHROCYTE [DISTWIDTH] IN BLOOD BY AUTOMATED COUNT: 11.7 % (ref 10–15)
GFR SERPL CREATININE-BSD FRML MDRD: 47 ML/MIN/1.7M2
GLUCOSE SERPL-MCNC: 177 MG/DL (ref 70–99)
HCT VFR BLD AUTO: 42.7 % (ref 35–47)
HGB BLD-MCNC: 14.7 G/DL (ref 11.7–15.7)
MCH RBC QN AUTO: 34.5 PG (ref 26.5–33)
MCHC RBC AUTO-ENTMCNC: 34.4 G/DL (ref 31.5–36.5)
MCV RBC AUTO: 100 FL (ref 78–100)
PLATELET # BLD AUTO: 214 10E9/L (ref 150–450)
POTASSIUM SERPL-SCNC: 4.2 MMOL/L (ref 3.4–5.3)
PROT UR-MCNC: 0.15 G/L
PROT/CREAT 24H UR: 0.11 G/G CR (ref 0–0.2)
RBC # BLD AUTO: 4.26 10E12/L (ref 3.8–5.2)
SODIUM SERPL-SCNC: 138 MMOL/L (ref 133–144)
TACROLIMUS BLD-MCNC: 4.8 UG/L (ref 5–15)
TME LAST DOSE: ABNORMAL H
WBC # BLD AUTO: 7.3 10E9/L (ref 4–11)

## 2017-08-30 PROCEDURE — 85027 COMPLETE CBC AUTOMATED: CPT | Performed by: INTERNAL MEDICINE

## 2017-08-30 PROCEDURE — 84156 ASSAY OF PROTEIN URINE: CPT | Performed by: INTERNAL MEDICINE

## 2017-08-30 PROCEDURE — 80197 ASSAY OF TACROLIMUS: CPT | Performed by: INTERNAL MEDICINE

## 2017-08-30 PROCEDURE — 80048 BASIC METABOLIC PNL TOTAL CA: CPT | Performed by: INTERNAL MEDICINE

## 2017-08-30 PROCEDURE — 36415 COLL VENOUS BLD VENIPUNCTURE: CPT | Performed by: INTERNAL MEDICINE

## 2017-09-10 ENCOUNTER — HEALTH MAINTENANCE LETTER (OUTPATIENT)
Age: 62
End: 2017-09-10

## 2017-10-31 DIAGNOSIS — E78.5 HYPERLIPIDEMIA: Primary | ICD-10-CM

## 2017-10-31 DIAGNOSIS — E78.5 HYPERLIPIDEMIA: ICD-10-CM

## 2017-10-31 DIAGNOSIS — Z94.0 LIVING-DONOR KIDNEY TRANSPLANT RECIPIENT: Primary | ICD-10-CM

## 2017-10-31 RX ORDER — SIMVASTATIN 20 MG
20 TABLET ORAL EVERY EVENING
Qty: 90 TABLET | Refills: 0 | Status: SHIPPED | OUTPATIENT
Start: 2017-10-31 | End: 2018-05-21

## 2017-10-31 NOTE — TELEPHONE ENCOUNTER
Drug Name: simvastatin 20mg  Last Fill Date: 8/4/17  Quantity: 90    Porsha Sky   [facility]  [phone]

## 2018-03-30 DIAGNOSIS — Z94.0 KIDNEY REPLACED BY TRANSPLANT: ICD-10-CM

## 2018-03-30 DIAGNOSIS — Z48.298 AFTERCARE FOLLOWING ORGAN TRANSPLANT: ICD-10-CM

## 2018-03-30 DIAGNOSIS — Z94.0 LIVING-DONOR KIDNEY TRANSPLANT RECIPIENT: ICD-10-CM

## 2018-03-30 DIAGNOSIS — E78.5 HYPERLIPIDEMIA: ICD-10-CM

## 2018-03-30 DIAGNOSIS — Z79.899 ENCOUNTER FOR LONG-TERM CURRENT USE OF MEDICATION: ICD-10-CM

## 2018-03-30 LAB
ANION GAP SERPL CALCULATED.3IONS-SCNC: 10 MMOL/L (ref 3–14)
BUN SERPL-MCNC: 18 MG/DL (ref 7–30)
CALCIUM SERPL-MCNC: 9.6 MG/DL (ref 8.5–10.1)
CHLORIDE SERPL-SCNC: 106 MMOL/L (ref 94–109)
CHOLEST SERPL-MCNC: 143 MG/DL
CO2 SERPL-SCNC: 23 MMOL/L (ref 20–32)
CREAT SERPL-MCNC: 1.09 MG/DL (ref 0.52–1.04)
ERYTHROCYTE [DISTWIDTH] IN BLOOD BY AUTOMATED COUNT: 11.4 % (ref 10–15)
GFR SERPL CREATININE-BSD FRML MDRD: 51 ML/MIN/1.7M2
GLUCOSE SERPL-MCNC: 141 MG/DL (ref 70–99)
HCT VFR BLD AUTO: 40.1 % (ref 35–47)
HDLC SERPL-MCNC: 41 MG/DL
HGB BLD-MCNC: 13.6 G/DL (ref 11.7–15.7)
LDLC SERPL CALC-MCNC: 72 MG/DL
MCH RBC QN AUTO: 34.2 PG (ref 26.5–33)
MCHC RBC AUTO-ENTMCNC: 33.9 G/DL (ref 31.5–36.5)
MCV RBC AUTO: 101 FL (ref 78–100)
NONHDLC SERPL-MCNC: 102 MG/DL
PLATELET # BLD AUTO: 246 10E9/L (ref 150–450)
POTASSIUM SERPL-SCNC: 4.5 MMOL/L (ref 3.4–5.3)
RBC # BLD AUTO: 3.98 10E12/L (ref 3.8–5.2)
SODIUM SERPL-SCNC: 139 MMOL/L (ref 133–144)
TACROLIMUS BLD-MCNC: 3.7 UG/L (ref 5–15)
TME LAST DOSE: 2230 H
TRIGL SERPL-MCNC: 148 MG/DL
WBC # BLD AUTO: 5.6 10E9/L (ref 4–11)

## 2018-03-30 PROCEDURE — 80197 ASSAY OF TACROLIMUS: CPT | Performed by: INTERNAL MEDICINE

## 2018-03-30 PROCEDURE — 80048 BASIC METABOLIC PNL TOTAL CA: CPT | Performed by: INTERNAL MEDICINE

## 2018-03-30 PROCEDURE — 85027 COMPLETE CBC AUTOMATED: CPT | Performed by: INTERNAL MEDICINE

## 2018-03-30 PROCEDURE — 36415 COLL VENOUS BLD VENIPUNCTURE: CPT | Performed by: INTERNAL MEDICINE

## 2018-03-30 PROCEDURE — 80061 LIPID PANEL: CPT | Performed by: INTERNAL MEDICINE

## 2018-05-21 DIAGNOSIS — Z94.0 KIDNEY TRANSPLANTED: ICD-10-CM

## 2018-05-21 DIAGNOSIS — Z94.0 KIDNEY REPLACED BY TRANSPLANT: ICD-10-CM

## 2018-05-21 DIAGNOSIS — E78.5 HYPERLIPIDEMIA: ICD-10-CM

## 2018-05-21 RX ORDER — MYCOPHENOLATE MOFETIL 250 MG/1
500 CAPSULE ORAL 2 TIMES DAILY
Qty: 120 CAPSULE | Refills: 6 | Status: SHIPPED | OUTPATIENT
Start: 2018-05-21 | End: 2019-09-11

## 2018-05-21 RX ORDER — TACROLIMUS 0.5 MG/1
0.5 CAPSULE, GELATIN COATED ORAL 2 TIMES DAILY
Qty: 60 CAPSULE | Refills: 6 | Status: SHIPPED | OUTPATIENT
Start: 2018-05-21 | End: 2019-07-31

## 2018-05-21 RX ORDER — TACROLIMUS 1 MG/1
1 CAPSULE, GELATIN COATED ORAL 2 TIMES DAILY
Qty: 60 CAPSULE | Refills: 6 | Status: SHIPPED | OUTPATIENT
Start: 2018-05-21 | End: 2020-09-25

## 2018-05-21 RX ORDER — SIMVASTATIN 20 MG
20 TABLET ORAL EVERY EVENING
Qty: 90 TABLET | Refills: 3 | Status: SHIPPED | OUTPATIENT
Start: 2018-05-21 | End: 2019-06-04

## 2018-05-21 NOTE — TELEPHONE ENCOUNTER
Drug: Tacrolimus 1mg  Last Fill Date: 1/3/2018  Quantity: 60        Drug: Tacrolimus 0.5mg  Last Fill Date: 1/3/2018  Quantity: 60      Drug: Cellcept  Last Fill Date: 1/3/2018  Quantity: 120

## 2018-05-24 ENCOUNTER — TELEPHONE (OUTPATIENT)
Dept: TRANSPLANT | Facility: CLINIC | Age: 63
End: 2018-05-24

## 2018-05-24 NOTE — TELEPHONE ENCOUNTER
Patient Call: General  Route to LPN    Reason for call: Needs letter.  Please call Eliana    Call back needed? Yes    Return Call Needed  Same as documented in contacts section  When to return call?: Greater than one day: Route standard priority

## 2018-05-24 NOTE — LETTER
DATE & TIME ISSUED: 2018 9:24 AM  PATIENT NAME: Eliana Elder   : 1955     DIAGNOSIS:  Kidney transplant  ICD-10 CODE: Z94.0     To whom it may concern,      Eliana Elder had a successful kidney transplant 2007 at the [facility]. Please contact [facility] medical record release of information department at [phone] for additional information.      Any questions please call: [phone]     Regards,   Solid [facility] Transplant

## 2018-05-24 NOTE — TELEPHONE ENCOUNTER
Call returned to patient. Patient states that she need a letter signed by her provider r\t a personal arbitration case that she's navigating. Patient is faxing letter to [facility].

## 2018-06-07 NOTE — TELEPHONE ENCOUNTER
Patient returned call requesting a return call to discuss her condition and following up on a letter that was sent.

## 2018-06-13 NOTE — TELEPHONE ENCOUNTER
Patient Call: General  Route to LPN    Reason for call: Patient called today to see if the letter was done.  Here court case is June 19th  Needs the letter faxed to her- Call her on her cell#  By 6/14     Call back needed? Yes    Return Call Needed  Same as documented in contacts section  When to return call?: Same day: Route High Priority

## 2018-07-18 ENCOUNTER — TELEPHONE (OUTPATIENT)
Dept: TRANSPLANT | Facility: CLINIC | Age: 63
End: 2018-07-18

## 2018-07-18 DIAGNOSIS — Z48.298 AFTERCARE FOLLOWING ORGAN TRANSPLANT: ICD-10-CM

## 2018-07-18 DIAGNOSIS — Z79.899 ENCOUNTER FOR LONG-TERM CURRENT USE OF MEDICATION: ICD-10-CM

## 2018-07-18 DIAGNOSIS — Z94.0 KIDNEY REPLACED BY TRANSPLANT: ICD-10-CM

## 2018-07-18 LAB
ANION GAP SERPL CALCULATED.3IONS-SCNC: 8 MMOL/L (ref 3–14)
BUN SERPL-MCNC: 16 MG/DL (ref 7–30)
CALCIUM SERPL-MCNC: 9.4 MG/DL (ref 8.5–10.1)
CHLORIDE SERPL-SCNC: 105 MMOL/L (ref 94–109)
CO2 SERPL-SCNC: 24 MMOL/L (ref 20–32)
CREAT SERPL-MCNC: 1.09 MG/DL (ref 0.52–1.04)
CREAT UR-MCNC: 77 MG/DL
ERYTHROCYTE [DISTWIDTH] IN BLOOD BY AUTOMATED COUNT: 11.9 % (ref 10–15)
GFR SERPL CREATININE-BSD FRML MDRD: 51 ML/MIN/1.7M2
GLUCOSE SERPL-MCNC: 278 MG/DL (ref 70–99)
HCT VFR BLD AUTO: 41.5 % (ref 35–47)
HGB BLD-MCNC: 14.2 G/DL (ref 11.7–15.7)
MCH RBC QN AUTO: 34.1 PG (ref 26.5–33)
MCHC RBC AUTO-ENTMCNC: 34.2 G/DL (ref 31.5–36.5)
MCV RBC AUTO: 100 FL (ref 78–100)
PLATELET # BLD AUTO: 207 10E9/L (ref 150–450)
POTASSIUM SERPL-SCNC: 4.5 MMOL/L (ref 3.4–5.3)
PROT UR-MCNC: 0.1 G/L
PROT/CREAT 24H UR: 0.13 G/G CR (ref 0–0.2)
RBC # BLD AUTO: 4.16 10E12/L (ref 3.8–5.2)
SODIUM SERPL-SCNC: 137 MMOL/L (ref 133–144)
TACROLIMUS BLD-MCNC: 4.6 UG/L (ref 5–15)
TME LAST DOSE: ABNORMAL H
WBC # BLD AUTO: 6.2 10E9/L (ref 4–11)

## 2018-07-18 PROCEDURE — 85027 COMPLETE CBC AUTOMATED: CPT | Performed by: INTERNAL MEDICINE

## 2018-07-18 PROCEDURE — 80048 BASIC METABOLIC PNL TOTAL CA: CPT | Performed by: INTERNAL MEDICINE

## 2018-07-18 PROCEDURE — 80197 ASSAY OF TACROLIMUS: CPT | Performed by: INTERNAL MEDICINE

## 2018-07-18 PROCEDURE — 36415 COLL VENOUS BLD VENIPUNCTURE: CPT | Performed by: INTERNAL MEDICINE

## 2018-07-18 PROCEDURE — 84156 ASSAY OF PROTEIN URINE: CPT | Performed by: INTERNAL MEDICINE

## 2018-09-04 DIAGNOSIS — Z94.0 KIDNEY TRANSPLANTED: Primary | ICD-10-CM

## 2018-09-04 RX ORDER — PREDNISONE 5 MG/1
5 TABLET ORAL DAILY
Qty: 30 TABLET | Refills: 11 | Status: SHIPPED | OUTPATIENT
Start: 2018-09-04 | End: 2019-09-11

## 2018-09-04 NOTE — TELEPHONE ENCOUNTER
Prednisone 5mg  Last FIlled Date 7/24  Qty dispensed 30    Thank you very kindly!  Ginger Taveras [facility]

## 2018-11-01 ENCOUNTER — ALLIED HEALTH/NURSE VISIT (OUTPATIENT)
Dept: NURSING | Facility: CLINIC | Age: 63
End: 2018-11-01
Payer: COMMERCIAL

## 2018-11-01 ENCOUNTER — TELEPHONE (OUTPATIENT)
Dept: TRANSPLANT | Facility: CLINIC | Age: 63
End: 2018-11-01

## 2018-11-01 DIAGNOSIS — Z48.298 AFTERCARE FOLLOWING ORGAN TRANSPLANT: ICD-10-CM

## 2018-11-01 DIAGNOSIS — Z79.899 ENCOUNTER FOR LONG-TERM CURRENT USE OF MEDICATION: ICD-10-CM

## 2018-11-01 DIAGNOSIS — Z94.0 KIDNEY REPLACED BY TRANSPLANT: ICD-10-CM

## 2018-11-01 DIAGNOSIS — Z23 NEED FOR PROPHYLACTIC VACCINATION AND INOCULATION AGAINST INFLUENZA: Primary | ICD-10-CM

## 2018-11-01 LAB
ANION GAP SERPL CALCULATED.3IONS-SCNC: 7 MMOL/L (ref 3–14)
BUN SERPL-MCNC: 20 MG/DL (ref 7–30)
CALCIUM SERPL-MCNC: 9.3 MG/DL (ref 8.5–10.1)
CHLORIDE SERPL-SCNC: 106 MMOL/L (ref 94–109)
CO2 SERPL-SCNC: 27 MMOL/L (ref 20–32)
CREAT SERPL-MCNC: 1.13 MG/DL (ref 0.52–1.04)
ERYTHROCYTE [DISTWIDTH] IN BLOOD BY AUTOMATED COUNT: 11.7 % (ref 10–15)
GFR SERPL CREATININE-BSD FRML MDRD: 48 ML/MIN/1.7M2
GLUCOSE SERPL-MCNC: 222 MG/DL (ref 70–99)
HCT VFR BLD AUTO: 41.4 % (ref 35–47)
HGB BLD-MCNC: 14.1 G/DL (ref 11.7–15.7)
MCH RBC QN AUTO: 34.1 PG (ref 26.5–33)
MCHC RBC AUTO-ENTMCNC: 34.1 G/DL (ref 31.5–36.5)
MCV RBC AUTO: 100 FL (ref 78–100)
PLATELET # BLD AUTO: 215 10E9/L (ref 150–450)
POTASSIUM SERPL-SCNC: 4.4 MMOL/L (ref 3.4–5.3)
RBC # BLD AUTO: 4.14 10E12/L (ref 3.8–5.2)
SODIUM SERPL-SCNC: 140 MMOL/L (ref 133–144)
TACROLIMUS BLD-MCNC: 3.8 UG/L (ref 5–15)
TME LAST DOSE: ABNORMAL H
WBC # BLD AUTO: 6 10E9/L (ref 4–11)

## 2018-11-01 PROCEDURE — 90471 IMMUNIZATION ADMIN: CPT

## 2018-11-01 PROCEDURE — 80197 ASSAY OF TACROLIMUS: CPT | Performed by: INTERNAL MEDICINE

## 2018-11-01 PROCEDURE — 80048 BASIC METABOLIC PNL TOTAL CA: CPT | Performed by: INTERNAL MEDICINE

## 2018-11-01 PROCEDURE — 90682 RIV4 VACC RECOMBINANT DNA IM: CPT

## 2018-11-01 PROCEDURE — 85027 COMPLETE CBC AUTOMATED: CPT | Performed by: INTERNAL MEDICINE

## 2018-11-01 PROCEDURE — 36415 COLL VENOUS BLD VENIPUNCTURE: CPT | Performed by: INTERNAL MEDICINE

## 2018-11-01 NOTE — TELEPHONE ENCOUNTER
ISSUE:  Glucose 222    PLAN:   Please call pt to encourage her to f/u with PCP regarding her blood sugar levels.  Ensure pt eating a low carb diet.

## 2018-11-01 NOTE — TELEPHONE ENCOUNTER
Call placed to patient. Patient v\u to f\u with her PCP r\t elevated BG level and will work on eating a low carb diet.

## 2018-11-01 NOTE — MR AVS SNAPSHOT
"              After Visit Summary   11/1/2018    Eliana Elder    MRN: [id number]           Patient Information     Date Of Birth          1955        Visit Information        Provider Department      11/1/2018 10:50 AM AN ANCILLARY [facility]        Today's Diagnoses     Need for prophylactic vaccination and inoculation against influenza    -  1       Follow-ups after your visit        Your next 10 appointments already scheduled     Nov 01, 2018 10:50 AM CDT   Nurse Only with AN ANCILLARY   [facility]    [address] [facility] [facility] [address] [phone]            Nov 09, 2018  8:40 AM CST   (Arrive by 8:10 AM)   Return Kidney Transplant with Angel Ruiz MD   [facility] ([facility])    [address]  [address]  [facility] [address] [phone]              Who to contact     If you have questions or need follow up information about today's clinic visit or your schedule please contact [facility] directly at [phone].  Normal or non-critical lab and imaging results will be communicated to you by MyChart, letter or phone within 4 business days after the clinic has received the results. If you do not hear from us within 7 days, please contact the clinic through [employer] or phone. If you have a critical or abnormal lab result, we will notify you by phone as soon as possible.  Submit refill requests through [employer] or call your pharmacy and they will forward the refill request to us. Please allow 3 business days for your refill to be completed.          Additional Information About Your Visit        [employer] Information     [employer] lets you send messages to your doctor, view your test results, renew your prescriptions, schedule appointments and more. To sign up, go to [email or web address] . Click on \"Log in\" on the left side of the screen, which will take you to the " "Welcome page. Then click on \"Sign up Now\" on the right side of the page.     You will be asked to enter the access code listed below, as well as some personal information. Please follow the directions to create your username and password.     Your access code is: [email or web address]  Expires: 2019  6:30 AM     Your access code will  in 90 days. If you need help or a new code, please call your [facility] clinic or [phone].        Care EveryWhere ID     This is your Care EveryWhere ID. This could be used by other organizations to access your [facility] medical records  [id number]         Blood Pressure from Last 3 Encounters:   17 (!) 134/93   13 136/84   12 125/81    Weight from Last 3 Encounters:   17 200 lb 11.2 oz (91 kg)   13 213 lb 12.8 oz (97 kg)              We Performed the Following     FLU VACCINE, (RIV4) RECOMBINANT HA  , IM (FluBlok, egg free) [20720]- >18 YRS (G recommended  50-64 YRS)     Vaccine Administration, Initial [03603]        Primary Care Provider Office Phone # Fax #    Angel Ruiz [phone]       [address] [place] [address]        Equal Access to Services     YANY MOSER : Hadii amina ku hadasho Soomaali, waaxda luqadaha, qaybta kaalmada sharon, sudeep sotelo. So [facility] [phone].    ATENCIÓN: Si habla español, tiene a aranda disposición servicios gratuitos de asistencia lingüística. Latasha al [phone].    We comply with applicable federal civil rights laws and [place] laws. We do not discriminate on the basis of race, color, national origin, age, disability, sex, sexual orientation, or gender identity.            Thank you!     Thank you for choosing [facility]  for your care. Our goal is always to provide you with excellent care. Hearing back from our patients is one way we can continue to improve our services. Please take a few minutes to complete the written survey " that you may receive in the mail after your visit with us. Thank you!             Your Updated Medication List - Protect others around you: Learn how to safely use, store and throw away your medicines at www.disposemymeds.org.          This list is accurate as of 11/1/18 10:26 AM.  Always use your most recent med list.                   Brand Name Dispense Instructions for use Diagnosis    ADVAIR DISKUS IN      Inhale 2 Inhalers into the lungs daily as needed.        allopurinol 100 MG tablet    ZYLOPRIM    60 tablet    Take 2 tablets (200 mg) by mouth daily    Gout       BONIVA 3 MG/3ML injection   Generic drug:  IBANdronate      Inject 3 mg into the vein. Once in 3 months        CALCIUM 1000 + D PO      Take 1 tablet by mouth daily Reported on 4/7/2017        DULERA 200-5 MCG/ACT oral inhaler   Generic drug:  mometasone-formoterol      Inhale 1 puff into the lungs 2 times daily Reported on 4/7/2017        fluticasone 27.5 MCG/SPRAY spray    VERAMYST     Spray 2 sprays into both nostrils daily        hydrochlorothiazide 12.5 MG capsule    MICROZIDE    30 capsule    Take 1 capsule (12.5 mg) by mouth daily    Essential hypertension       metoprolol tartrate 25 MG tablet    LOPRESSOR    60 tablet    Take 1 tablet (25 mg) by mouth 2 times daily    Chronic kidney disease, unspecified       mycophenolate 250 MG capsule     120 capsule    Take 2 capsules (500 mg) by mouth 2 times daily GENERIC    Kidney transplanted       NASONEX 50 MCG/ACT spray   Generic drug:  mometasone      Spray 2 sprays into both nostrils daily Reported on 4/7/2017        omeprazole 20 MG CR capsule    priLOSEC    90 capsule    Take 1 capsule (20 mg) by mouth daily    Kidney replaced by transplant       predniSONE 5 MG tablet    DELTASONE    30 tablet    Take 1 tablet (5 mg) by mouth daily    Kidney transplanted       simvastatin 20 MG tablet    ZOCOR    90 tablet    Take 1 tablet (20 mg) by mouth every evening    Hyperlipidemia       * tacrolimus  0.5 MG capsule     60 capsule    Take 1 capsule (0.5 mg) by mouth 2 times daily (total dose 1.5 mg twice daily) GENERIC    Kidney replaced by transplant       * tacrolimus 1 MG capsule     60 capsule    Take 1 capsule (1 mg) by mouth 2 times daily (total dose 1.5 mg twice daily) GENERIC    Kidney replaced by transplant       VENTOLIN IN      Inhale 1 Inhaler into the lungs daily as needed.        * Notice:  This list has 2 medication(s) that are the same as other medications prescribed for you. Read the directions carefully, and ask your doctor or other care provider to review them with you.

## 2018-11-01 NOTE — PROGRESS NOTES

## 2018-11-08 ENCOUNTER — PATIENT OUTREACH (OUTPATIENT)
Dept: CARE COORDINATION | Facility: CLINIC | Age: 63
End: 2018-11-08

## 2018-12-10 ENCOUNTER — TELEPHONE (OUTPATIENT)
Dept: NEPHROLOGY | Facility: CLINIC | Age: 63
End: 2018-12-10

## 2018-12-10 NOTE — TELEPHONE ENCOUNTER
Spoke with patient for transplant follow up. States she's doing well overall, did see her PCP about elevated blood sugar readings. She had to cancel appointments a few times due to her 's health. Denied questions ahead of her appointment.    Lara Murphy RN

## 2019-02-01 ENCOUNTER — TELEPHONE (OUTPATIENT)
Dept: TRANSPLANT | Facility: CLINIC | Age: 64
End: 2019-02-01

## 2019-02-01 NOTE — TELEPHONE ENCOUNTER
Received message from pharmacy concerned about frequency of pt refilling meds. Called pt to check in. States she has been feeling well but has had a lot going on lately. Reminded her to get labs done soon (due now). Pt states she will make appt to have them done in 2 weeks. Aware of neph appt 2/21.

## 2019-03-26 ENCOUNTER — DOCUMENTATION ONLY (OUTPATIENT)
Dept: LAB | Facility: CLINIC | Age: 64
End: 2019-03-26

## 2019-03-26 DIAGNOSIS — Z48.298 AFTERCARE FOLLOWING ORGAN TRANSPLANT: Primary | ICD-10-CM

## 2019-03-26 DIAGNOSIS — Z94.0 KIDNEY REPLACED BY TRANSPLANT: ICD-10-CM

## 2019-03-26 DIAGNOSIS — Z79.899 ENCOUNTER FOR LONG-TERM CURRENT USE OF MEDICATION: ICD-10-CM

## 2019-03-26 NOTE — PROGRESS NOTES
Patient is scheduled for a Lab appointment on 4/2/2019 at the [facility] for standing lab orders.  Please enter future orders, or call to advise patient to cancel appointment if labs are not needed.  Thank you.

## 2019-05-23 ENCOUNTER — OFFICE VISIT (OUTPATIENT)
Dept: NEPHROLOGY | Facility: CLINIC | Age: 64
End: 2019-05-23
Payer: COMMERCIAL

## 2019-05-23 VITALS
HEART RATE: 58 BPM | HEIGHT: 65 IN | DIASTOLIC BLOOD PRESSURE: 80 MMHG | TEMPERATURE: 97.7 F | SYSTOLIC BLOOD PRESSURE: 134 MMHG | WEIGHT: 189.2 LBS | BODY MASS INDEX: 31.52 KG/M2

## 2019-05-23 DIAGNOSIS — Z48.298 AFTERCARE FOLLOWING ORGAN TRANSPLANT: Primary | ICD-10-CM

## 2019-05-23 DIAGNOSIS — Z79.899 HIGH RISK MEDICATION USE: ICD-10-CM

## 2019-05-23 DIAGNOSIS — I10 ESSENTIAL HYPERTENSION: ICD-10-CM

## 2019-05-23 DIAGNOSIS — Z94.0 LIVING-DONOR KIDNEY TRANSPLANT RECIPIENT: ICD-10-CM

## 2019-05-23 DIAGNOSIS — D84.9 IMMUNOSUPPRESSION (H): ICD-10-CM

## 2019-05-23 PROCEDURE — G0463 HOSPITAL OUTPT CLINIC VISIT: HCPCS | Mod: ZF

## 2019-05-23 RX ORDER — METFORMIN HCL 500 MG
1000 TABLET, EXTENDED RELEASE 24 HR ORAL
COMMUNITY
Start: 2018-11-19 | End: 2020-12-07

## 2019-05-23 RX ORDER — TACROLIMUS 1 MG/1
1 CAPSULE ORAL
COMMUNITY
End: 2019-06-03

## 2019-05-23 ASSESSMENT — MIFFLIN-ST. JEOR: SCORE: 1409.09

## 2019-05-23 ASSESSMENT — PAIN SCALES - GENERAL: PAINLEVEL: NO PAIN (0)

## 2019-05-23 NOTE — PROGRESS NOTES
CHRONIC TRANSPLANT NEPHROLOGY VISIT    Assessment & Plan   # LDKT: Stable   - Baseline Cr ~ 1.0 - 1.1   - Proteinuria: Normal (<0.2 grams)   - Date DSA Last Checked: Not Known       Latest DSA: not checked recently   - BK Viremia: Not checked recently   - Kidney Tx Biopsy: No    # Immunosuppression: Tacrolimus immediate release (goal 4-6), Mycophenolate mofetil (goal not followed) and Prednisone (dose 5 mg daily)   - Changes: No    # Prophylaxis:   - PJP: None    # Blood Pressure:  Controlled; Goal BP: < 130/80   - Changes: No    # Diabetes: Borderline control (HbA1c 7-9%)   - Management as per primary team.     # Transplant History:  Etiology of kidney failure: Unknown etiology  Tx: LDKT  Transplant: 8/16/2007 (Kidney)  Donor Type: Living Donor Class: Standard Criteria Donor  Significant changes in immunosuppression: None  Significant transplant-related complications: None    Transplant Office Phone Number: [phone]    Assessment and plan was discussed with the patient and she voiced her understanding and agreement.    Return visit: No follow-ups on file.    Chief Complaint   Ms. Elder is a 63 year old here for routine follow up.    History of Present Illness     Today the patient is doing well. She reports no lower extremity edema, but does notice minor pockets of swelling around her eyes. She says that this swelling is most likely due to recent allergies. She continues to do well on her medications. Minimal diarrhea, mostly caused by her metformin, but much improved as of late. She has gained ~ 40 lbs since her transplant, but no significant weight changes since last visit. She doesn't check her blood pressure at home. Denies increased urinary frequency.     Recent Hospitalizations:  [x] No [] Yes    New Medical Issues: [x] No [] Yes    Decreased energy: [x] No [] Yes    Chest pain or SOB with exertion:  [x] No [] Yes    Appetite change or weight change: [] No [x] Yes    Nausea, vomiting or diarrhea:   [] No [x] Yes Minimal    Fever, sweats or chills: [] No [] Yes    Leg swelling: [x] No [] Yes      Home BP: Doesn't know    Review of Systems   A comprehensive review of systems was obtained and negative, except as noted in the HPI or PMH.    Problem List   Patient Active Problem List   Diagnosis     Chronic kidney disease     Essential hypertension     High risk medication use     Living-donor kidney transplant recipient     Reactive airway disease     Gout     Hyperlipidemia       Social History   Social History     Tobacco Use     Smoking status: Never Smoker     Smokeless tobacco: Never Used   Substance Use Topics     Alcohol use: Not on file     Drug use: Not on file       Allergies   Allergies   Allergen Reactions     Dust Mites Unknown       Medications   Current Outpatient Medications   Medication Sig     Albuterol (VENTOLIN IN) Inhale 1 Inhaler into the lungs daily as needed.     allopurinol (ZYLOPRIM) 100 MG tablet Take 2 tablets (200 mg) by mouth daily     Calcium Carb-Cholecalciferol (CALCIUM 1000 + D PO) Take 1 tablet by mouth daily Reported on 4/7/2017     CELLCEPT (BRAND) 250 MG CAPSULE Take 2 capsules (500 mg) by mouth 2 times daily GENERIC     fluticasone (VERAMYST) 27.5 MCG/SPRAY spray Spray 2 sprays into both nostrils daily     Fluticasone-Salmeterol (ADVAIR DISKUS IN) Inhale 2 Inhalers into the lungs daily as needed.     hydrochlorothiazide (MICROZIDE) 12.5 MG capsule Take 1 capsule (12.5 mg) by mouth daily     IBANdronate (BONIVA) 3 MG/3ML injection Inject 3 mg into the vein. Once in 3 months     metoprolol (LOPRESSOR) 25 MG tablet Take 1 tablet (25 mg) by mouth 2 times daily     mometasone (NASONEX) 50 MCG/ACT nasal spray Spray 2 sprays into both nostrils daily Reported on 4/7/2017     mometasone-formoterol (DULERA) 200-5 MCG/ACT oral inhaler Inhale 1 puff into the lungs 2 times daily Reported on 4/7/2017     omeprazole (PRILOSEC) 20 MG CR capsule Take 1 capsule (20 mg) by mouth daily      predniSONE (DELTASONE) 5 MG tablet Take 1 tablet (5 mg) by mouth daily     PROGRAF (BRAND) 0.5 MG CAPSULE Take 1 capsule (0.5 mg) by mouth 2 times daily (total dose 1.5 mg twice daily) GENERIC     PROGRAF (BRAND) 1 MG CAPSULE Take 1 capsule (1 mg) by mouth 2 times daily (total dose 1.5 mg twice daily) GENERIC     simvastatin (ZOCOR) 20 MG tablet Take 1 tablet (20 mg) by mouth every evening     No current facility-administered medications for this visit.      There are no discontinued medications.    Physical Exam   Vital Signs: There were no vitals taken for this visit.    GENERAL APPEARANCE: alert and no distress  HENT: mouth without ulcers or lesions  LYMPHATICS: no cervical or supraclavicular nodes  RESP: lungs clear to auscultation - no rales, rhonchi or wheezes  CV: regular rhythm, normal rate, no rub, no murmur  EDEMA: no LE edema bilaterally  ABDOMEN: soft, nondistended, nontender, bowel sounds normal  MS: extremities normal - no gross deformities noted, no evidence of inflammation in joints, no muscle tenderness  SKIN: no rash      Data     Renal Latest Ref Rng & Units 11/1/2018 7/18/2018 3/30/2018   Na 133 - 144 mmol/L 140 137 139   Na (external) 135 - 145 mmol/L - - -   K 3.4 - 5.3 mmol/L 4.4 4.5 4.5   K (external) 3.5 - 5.0 mmol/L - - -   Cl 94 - 109 mmol/L 106 105 106   Cl (external) 98 - 110 mmol/L - - -   CO2 20 - 32 mmol/L 27 24 23   CO2 (external) 21 - 31 mmol/L - - -   BUN 7 - 30 mg/dL 20 16 18   BUN (external) 8 - 25 mg/dL - - -   Cr 0.52 - 1.04 mg/dL 1.13(H) 1.09(H) 1.09(H)   Cr (external) 0.57 - 1.11 mg/dL - - -   Glucose 70 - 99 mg/dL 222(H) 278(H) 141(H)   Glucose (external) 65 - 100 mg/dL - - -   Ca  8.5 - 10.1 mg/dL 9.3 9.4 9.6   Ca (external) 8.5 - 10.5 mg/dL - - -   Mg 1.6 - 2.3 mg/dL - - -   Mg (external) 1.6 - 2.6 mg/dL - - -     Bone Health Latest Ref Rng & Units 11/5/2010 2/26/2010 12/5/2009   Phos 2.5 - 4.5 mg/dL 2.9 3.0 3.2   PTHi 12 - 72 pg/mL - - -     Heme Latest Ref Rng & Units  11/1/2018 7/18/2018 3/30/2018   WBC 4.0 - 11.0 10e9/L 6.0 6.2 5.6   WBC (external) 4.5 - 11.0 thou/cu mm - - -   Hgb 11.7 - 15.7 g/dL 14.1 14.2 13.6   Hgb (external) 12.0 - 16.0 g/dL - - -   Plt 150 - 450 10e9/L 215 207 246   Plt (external) 140 - 440 thou/cu mm - - -     Liver Latest Ref Rng & Units 12/24/2014 11/5/2010 2/26/2010   AP 40 - 150 U/L - - -   AP (external) 50 - 136 U/L 130 - -   TBili 0.2 - 1.3 mg/dL - - -   TBili (external) 0.2 - 1.2 mg/dL 0.7 - -   ALT 0 - 50 U/L - - -   ALT (external) 8 - 45 U/L 51(H) - -   AST 0 - 45 U/L - - -   AST (external) 2 - 40 U/L 27 - -   Tot Protein 6.8 - 8.8 g/dL - - -   Tot Protein (external) 6.0 - 8.0 g/dL 6.5 - -   Albumin 3.3 - 4.9 g/dL - 3.8 3.6   Albumin (external) 3.2 - 4.6 g/dL 3.8 - -     Pancreas Latest Ref Rng & Units 12/24/2014 8/29/2007 8/28/2007   A1C (external) <=6.4 7.1(H) - -   Amylase 30 - 110 U/L - 57 53   Lipase 20 - 250 U/L - 54 36     Iron studies Latest Ref Rng & Units 10/8/2007 8/28/2007   Iron 35 - 180 ug/dL 155 39   Ferritin 10 - 300 ng/mL 662(H) 287     [facility] Txp Virology Latest Ref Rng & Units 8/18/2009 7/10/2009 6/13/2008   CMV IgG EU/mL - - -   CVM DNA Quant - - - -   CMV Quant <100 Copies/mL - - -   CMV QT Log <2.0 Log copies/mL - - -   BK Spec - Plasma, EDTA anticoagulant Plasma, EDTA anticoagulant Plasma, EDTA anticoagulant   BK Res <1000 copies/mL <1000 <1000 <1000   BK Log <3.0 Log copies/mL <3.0 <3.0 <3.0   EBV IgG - - - -   Hep B Core NEG - - -   Hep B Surf 0.0 - 4.9 mIU/mL - - -   HIV 1&2 NEG - - -        Recent Labs   Lab Test 03/30/18  0957 07/18/18  0858 11/01/18  0950   DOSTAC 2,230 2200 7/17/18 10/31/18 2145   TACROL 3.7* 4.6* 3.8*         Scribe Disclosure:   I, Hebert Baer, am serving as a scribe; to document services personally performed by Viral Paige -based on data collection and the provider's statements to me.     Provider Disclosure:  I agree with above History, Review of Systems, Physical exam and Plan.  I have reviewed  the content of the documentation and have edited it as needed. I have personally performed the services documented here and the documentation accurately represents those services and the decisions I have made.      Electronically signed by:  Shashank Gibson

## 2019-05-23 NOTE — PATIENT INSTRUCTIONS
Preventive Care:    Breast Cancer Screening: During our visit today, we discussed that it is recommended you receive breast cancer screening. Please call or make an appointment with your primary care provider to discuss this with them. You may also call the [facility] scheduling line ([phone]) to set up a mammography appointment at the [facility] within the [facility].

## 2019-05-23 NOTE — NURSING NOTE
"Chief Complaint   Patient presents with     RECHECK     Kidney tx     /80 (BP Location: Right arm, Patient Position: Sitting, Cuff Size: Adult Regular)   Pulse 58   Temp 97.7  F (36.5  C) (Oral)   Ht 1.651 m (5' 5\")   Wt 85.8 kg (189 lb 3.2 oz)   BMI 31.48 kg/m    Xi Cedeno CMA  "

## 2019-05-23 NOTE — NURSING NOTE
Eliana Elder was seen today in clinic by this writer. Medications, lab orders, lab frequency, and necessary follow up discussed with patient. Patient was provided with a copy of the current lab letter. Patient voiced understanding and agreement of education and plan.     Laury Chris

## 2019-05-28 ENCOUNTER — DOCUMENTATION ONLY (OUTPATIENT)
Dept: TRANSPLANT | Facility: CLINIC | Age: 64
End: 2019-05-28

## 2019-05-28 NOTE — PROGRESS NOTES
Received message from post txp coordinator to follow up with patient in regard to her diabetes control. Per chart review, no recent A1c (our system or Care Everywhere) since 11/2018. Pt also started on metformin at this time. Requested repeat A1c from txp coordinator in order to guide my education with pt.

## 2019-06-03 DIAGNOSIS — Z94.0 KIDNEY TRANSPLANTED: Primary | ICD-10-CM

## 2019-06-03 DIAGNOSIS — E78.5 HYPERLIPIDEMIA: ICD-10-CM

## 2019-06-03 RX ORDER — TACROLIMUS 1 MG/1
1 CAPSULE ORAL 2 TIMES DAILY
Qty: 60 CAPSULE | Refills: 0 | Status: SHIPPED | OUTPATIENT
Start: 2019-06-03 | End: 2019-07-31

## 2019-06-04 DIAGNOSIS — E78.5 HYPERLIPIDEMIA: Primary | ICD-10-CM

## 2019-06-04 RX ORDER — SIMVASTATIN 20 MG
20 TABLET ORAL EVERY EVENING
Qty: 90 TABLET | Refills: 3 | Status: SHIPPED | OUTPATIENT
Start: 2019-06-04 | End: 2020-06-16

## 2019-06-26 ENCOUNTER — TELEPHONE (OUTPATIENT)
Dept: TRANSPLANT | Facility: CLINIC | Age: 64
End: 2019-06-26

## 2019-06-26 DIAGNOSIS — Z94.0 KIDNEY TRANSPLANTED: ICD-10-CM

## 2019-06-26 DIAGNOSIS — R73.9 ELEVATED BLOOD SUGAR: Primary | ICD-10-CM

## 2019-06-26 NOTE — TELEPHONE ENCOUNTER
Call placed to pt to remind her to get labs drawn and explain that she needs another A1C prior to dietician helping with diabetic diet education.   No answer, unable to leave voicemail. Will try back.

## 2019-07-16 DIAGNOSIS — Z48.298 AFTERCARE FOLLOWING ORGAN TRANSPLANT: ICD-10-CM

## 2019-07-16 DIAGNOSIS — R73.9 ELEVATED BLOOD SUGAR: ICD-10-CM

## 2019-07-16 DIAGNOSIS — E78.5 HYPERLIPIDEMIA: ICD-10-CM

## 2019-07-16 DIAGNOSIS — Z79.899 ENCOUNTER FOR LONG-TERM CURRENT USE OF MEDICATION: ICD-10-CM

## 2019-07-16 DIAGNOSIS — Z94.0 KIDNEY TRANSPLANTED: ICD-10-CM

## 2019-07-16 DIAGNOSIS — Z94.0 KIDNEY REPLACED BY TRANSPLANT: ICD-10-CM

## 2019-07-16 LAB
ANION GAP SERPL CALCULATED.3IONS-SCNC: 4 MMOL/L (ref 3–14)
BUN SERPL-MCNC: 21 MG/DL (ref 7–30)
CALCIUM SERPL-MCNC: 9.3 MG/DL (ref 8.5–10.1)
CHLORIDE SERPL-SCNC: 107 MMOL/L (ref 94–109)
CHOLEST SERPL-MCNC: 148 MG/DL
CO2 SERPL-SCNC: 26 MMOL/L (ref 20–32)
CREAT SERPL-MCNC: 1.09 MG/DL (ref 0.52–1.04)
CREAT UR-MCNC: 114 MG/DL
ERYTHROCYTE [DISTWIDTH] IN BLOOD BY AUTOMATED COUNT: 11.7 % (ref 10–15)
GFR SERPL CREATININE-BSD FRML MDRD: 53 ML/MIN/{1.73_M2}
GLUCOSE SERPL-MCNC: 202 MG/DL (ref 70–99)
HBA1C MFR BLD: 8.6 % (ref 0–5.6)
HCT VFR BLD AUTO: 41.4 % (ref 35–47)
HDLC SERPL-MCNC: 41 MG/DL
HGB BLD-MCNC: 14.2 G/DL (ref 11.7–15.7)
LDLC SERPL CALC-MCNC: 69 MG/DL
MCH RBC QN AUTO: 34.1 PG (ref 26.5–33)
MCHC RBC AUTO-ENTMCNC: 34.3 G/DL (ref 31.5–36.5)
MCV RBC AUTO: 99 FL (ref 78–100)
NONHDLC SERPL-MCNC: 107 MG/DL
PLATELET # BLD AUTO: 225 10E9/L (ref 150–450)
POTASSIUM SERPL-SCNC: 4.8 MMOL/L (ref 3.4–5.3)
PROT UR-MCNC: 0.15 G/L
PROT/CREAT 24H UR: 0.13 G/G CR (ref 0–0.2)
RBC # BLD AUTO: 4.17 10E12/L (ref 3.8–5.2)
SODIUM SERPL-SCNC: 137 MMOL/L (ref 133–144)
TACROLIMUS BLD-MCNC: 4.6 UG/L (ref 5–15)
TME LAST DOSE: ABNORMAL H
TRIGL SERPL-MCNC: 188 MG/DL
WBC # BLD AUTO: 7.1 10E9/L (ref 4–11)

## 2019-07-16 PROCEDURE — 36415 COLL VENOUS BLD VENIPUNCTURE: CPT | Performed by: INTERNAL MEDICINE

## 2019-07-16 PROCEDURE — 83036 HEMOGLOBIN GLYCOSYLATED A1C: CPT | Performed by: INTERNAL MEDICINE

## 2019-07-16 PROCEDURE — 80197 ASSAY OF TACROLIMUS: CPT | Performed by: INTERNAL MEDICINE

## 2019-07-16 PROCEDURE — 84156 ASSAY OF PROTEIN URINE: CPT | Performed by: INTERNAL MEDICINE

## 2019-07-16 PROCEDURE — 80048 BASIC METABOLIC PNL TOTAL CA: CPT | Performed by: INTERNAL MEDICINE

## 2019-07-16 PROCEDURE — 85027 COMPLETE CBC AUTOMATED: CPT | Performed by: INTERNAL MEDICINE

## 2019-07-16 PROCEDURE — 80061 LIPID PANEL: CPT | Performed by: INTERNAL MEDICINE

## 2019-07-26 ENCOUNTER — TELEPHONE (OUTPATIENT)
Dept: TRANSPLANT | Facility: CLINIC | Age: 64
End: 2019-07-26

## 2019-07-26 NOTE — TELEPHONE ENCOUNTER
Called pt to discuss DM management, with recent A1c result of 8.6. Pt did not answer and mailbox is full. Will call pt again on Monday.

## 2019-07-30 ENCOUNTER — TELEPHONE (OUTPATIENT)
Dept: TRANSPLANT | Facility: CLINIC | Age: 64
End: 2019-07-30

## 2019-07-31 DIAGNOSIS — Z94.0 KIDNEY TRANSPLANTED: ICD-10-CM

## 2019-07-31 DIAGNOSIS — Z94.0 KIDNEY REPLACED BY TRANSPLANT: Primary | ICD-10-CM

## 2019-07-31 RX ORDER — TACROLIMUS 1 MG/1
1 CAPSULE ORAL 2 TIMES DAILY
Qty: 60 CAPSULE | Refills: 11 | Status: SHIPPED | OUTPATIENT
Start: 2019-07-31 | End: 2021-02-03

## 2019-07-31 RX ORDER — TACROLIMUS 0.5 MG/1
0.5 CAPSULE, GELATIN COATED ORAL 2 TIMES DAILY
Qty: 60 CAPSULE | Refills: 11 | Status: SHIPPED | OUTPATIENT
Start: 2019-07-31 | End: 2020-09-25

## 2019-08-01 ENCOUNTER — TELEPHONE (OUTPATIENT)
Dept: TRANSPLANT | Facility: CLINIC | Age: 64
End: 2019-08-01

## 2019-08-01 NOTE — TELEPHONE ENCOUNTER
Called pt to discuss diet to help improve A1c. A1c recently 8.6. On metformin. Pt reports hectic home life, caring for her  with medical issues, daughter living with her currently (and granddaughter). She acknowledges these aren't excuses, yet she should focus on putting her health at more top priority.     B- 50% of the time or less; cereal, boiled egg, toast, banana   L- salad or wrap  D- turkey with mashed potato and salad   Sn- pretzels or jenn crackers, fruit   Zoila- water/seltzer water, 1-2 regular soda/day, 1/3 cup juice, alcohol 1x/month  Eating out- 2x/week    Interventions:  - Discussed consistent carb diet and consistent eating schedule being on an oral agent. Encouraged pt to eat breakfast more often, grabbing quick things to increase frequency of eating breakfast.   - Also discussed pairing a protein with her carbohydrate snack (ie fruit with peanut butter, trail mix, string cheese, boiled eggs, cheese and crackers)  - Reduce sugar sweetened beverage intake    Pt reports she has established with a new PCP within the [facility] system. She is wondering if they have access to her most recent A1c. I will reach out to her txp coordinator about this. Provided pt with her coordinator's name. Also encouraged pt to consider meeting with a diabetic educator (CDE), either by referral from her PCP at [facility] or within the  system. CDE are here at the [facility] or at various [facility] clinics. Encouraged her to reach out to her txp coordinator if she would like a referral here.     Pt has phone number if further questions arise.

## 2019-09-11 DIAGNOSIS — Z94.0 KIDNEY TRANSPLANTED: Primary | ICD-10-CM

## 2019-09-11 RX ORDER — PREDNISONE 5 MG/1
5 TABLET ORAL DAILY
Qty: 30 TABLET | Refills: 11 | Status: SHIPPED | OUTPATIENT
Start: 2019-09-11 | End: 2020-09-25

## 2019-09-11 RX ORDER — MYCOPHENOLATE MOFETIL 250 MG/1
500 CAPSULE ORAL 2 TIMES DAILY
Qty: 120 CAPSULE | Refills: 11 | Status: SHIPPED | OUTPATIENT
Start: 2019-09-11 | End: 2020-09-25

## 2020-01-27 DIAGNOSIS — Z94.0 KIDNEY REPLACED BY TRANSPLANT: ICD-10-CM

## 2020-01-27 DIAGNOSIS — Z48.298 AFTERCARE FOLLOWING ORGAN TRANSPLANT: ICD-10-CM

## 2020-01-27 DIAGNOSIS — Z79.899 ENCOUNTER FOR LONG-TERM CURRENT USE OF MEDICATION: ICD-10-CM

## 2020-01-27 LAB
ANION GAP SERPL CALCULATED.3IONS-SCNC: 1 MMOL/L (ref 3–14)
BUN SERPL-MCNC: 19 MG/DL (ref 7–30)
CALCIUM SERPL-MCNC: 9.8 MG/DL (ref 8.5–10.1)
CHLORIDE SERPL-SCNC: 106 MMOL/L (ref 94–109)
CO2 SERPL-SCNC: 27 MMOL/L (ref 20–32)
CREAT SERPL-MCNC: 1.16 MG/DL (ref 0.52–1.04)
ERYTHROCYTE [DISTWIDTH] IN BLOOD BY AUTOMATED COUNT: 11.8 % (ref 10–15)
GFR SERPL CREATININE-BSD FRML MDRD: 49 ML/MIN/{1.73_M2}
GLUCOSE SERPL-MCNC: 233 MG/DL (ref 70–99)
HCT VFR BLD AUTO: 44.1 % (ref 35–47)
HGB BLD-MCNC: 14.7 G/DL (ref 11.7–15.7)
MCH RBC QN AUTO: 33.3 PG (ref 26.5–33)
MCHC RBC AUTO-ENTMCNC: 33.3 G/DL (ref 31.5–36.5)
MCV RBC AUTO: 100 FL (ref 78–100)
PLATELET # BLD AUTO: 215 10E9/L (ref 150–450)
POTASSIUM SERPL-SCNC: 5 MMOL/L (ref 3.4–5.3)
RBC # BLD AUTO: 4.41 10E12/L (ref 3.8–5.2)
SODIUM SERPL-SCNC: 134 MMOL/L (ref 133–144)
WBC # BLD AUTO: 6.8 10E9/L (ref 4–11)

## 2020-01-27 PROCEDURE — 80048 BASIC METABOLIC PNL TOTAL CA: CPT | Performed by: INTERNAL MEDICINE

## 2020-01-27 PROCEDURE — 85027 COMPLETE CBC AUTOMATED: CPT | Performed by: INTERNAL MEDICINE

## 2020-01-27 PROCEDURE — 80197 ASSAY OF TACROLIMUS: CPT | Performed by: INTERNAL MEDICINE

## 2020-01-27 PROCEDURE — 36415 COLL VENOUS BLD VENIPUNCTURE: CPT | Performed by: INTERNAL MEDICINE

## 2020-01-28 ENCOUNTER — TELEPHONE (OUTPATIENT)
Dept: TRANSPLANT | Facility: CLINIC | Age: 65
End: 2020-01-28

## 2020-01-28 DIAGNOSIS — Z94.0 KIDNEY TRANSPLANTED: Primary | ICD-10-CM

## 2020-01-28 LAB
TACROLIMUS BLD-MCNC: 5.7 UG/L (ref 5–15)
TME LAST DOSE: NORMAL H

## 2020-01-28 NOTE — TELEPHONE ENCOUNTER
ISSUE:  -BG elevated at 233.  -Creatinine slightly elevated above baseline at 1.16 (1.0-1.1).    PLAN:  -Who is managing pt's BG? -pt report she is seeing an endocrinologist at the end of the month.  -How is pt's hydration? Pt reports she is not drinking as much as she usually does.   -encouraged hydration and report BMP in 2 weeks.    OUTCOME:  Pt v/u of plan of care. No further questions or concerns at this time.

## 2020-03-19 ENCOUNTER — TELEPHONE (OUTPATIENT)
Dept: TRANSPLANT | Facility: CLINIC | Age: 65
End: 2020-03-19

## 2020-03-19 NOTE — TELEPHONE ENCOUNTER
RNCC reviewed ID recommendations:     The best prevention for avoiding all viral illness, including influenza and COVID-19, is thorough and frequent handwashing. You should avoid touching your face, nose, and mouth. Alcohol based hand sanitizers are also an effective method to clean your hands. For more information, the CDC website has numerous resources that are updated three times per week.     Eliana Elder v/u recommendations. She will take extra precautions at work.

## 2020-03-19 NOTE — TELEPHONE ENCOUNTER
Patient Call: General  Route to LPN    Reason for call: Please connect with pt regarding COVID 19 and he doing her BMP     Call back needed? Yes    Return Call Needed  Same as documented in contacts section  When to return call?: Greater than one day: Route standard priority

## 2020-06-12 ENCOUNTER — TELEPHONE (OUTPATIENT)
Dept: TRANSPLANT | Facility: CLINIC | Age: 65
End: 2020-06-12

## 2020-06-12 NOTE — TELEPHONE ENCOUNTER
Call placed to patient. Patient note that she feel well. Confirms taking IS medications as ordered. Note that sometimes she fill them at a different pharmacy. Patient v\u to complete transplant labs asap.

## 2020-06-12 NOTE — TELEPHONE ENCOUNTER
ISSUE:  Received message from pharmacy that pt only filled meds 4x in the last year    PLAN:  Call pt to check in for general health update  Verify that she is taking meds as prescribed  Remind her to get labs drawn    LPN TASK:   Call with above instructions.

## 2020-06-16 DIAGNOSIS — E78.5 HYPERLIPIDEMIA: ICD-10-CM

## 2020-06-16 RX ORDER — SIMVASTATIN 20 MG
20 TABLET ORAL EVERY EVENING
Qty: 90 TABLET | Refills: 3 | Status: SHIPPED | OUTPATIENT
Start: 2020-06-16 | End: 2021-07-13

## 2020-06-23 ENCOUNTER — TELEPHONE (OUTPATIENT)
Dept: TRANSPLANT | Facility: CLINIC | Age: 65
End: 2020-06-23

## 2020-06-23 NOTE — TELEPHONE ENCOUNTER
Spoke with patient. She reports her daughter is a nursing assistant at a nursing home, got sick on Saturday, tested positive for COVID today. Recommended pt social distance as much as possible, disinfect all frequently used surfaces and wash hands often. Pt reports she is already doing all of these things. Wondering if she should get tested for COVID? Instructed pt to call pcp if she develop symptoms. Notify tx office if she tests positive for COVID and/or she develops symptoms. Pt v/u.

## 2020-06-23 NOTE — TELEPHONE ENCOUNTER
Patient Call: General    Reason for call: patient's daughter tested Positive for Covid this Morning and lives with the patient.  Call back needed? Yes    Return Call Needed  Same as documented in contacts section  When to return call?: Same day: Route High Priority

## 2020-06-30 ENCOUNTER — TELEPHONE (OUTPATIENT)
Dept: TRANSPLANT | Facility: CLINIC | Age: 65
End: 2020-06-30

## 2020-06-30 DIAGNOSIS — Z48.298 AFTERCARE FOLLOWING ORGAN TRANSPLANT: Primary | ICD-10-CM

## 2020-06-30 NOTE — TELEPHONE ENCOUNTER
Provider Call: Transplant Lab/Orders  Route to LPN  Post Transplant Days: 4702  When patient is less than 60 days post-transplant, route high priority  Reason for Call: updated lab order in [facility]  Liver patients reporting abnormal lab results: Route to RN and Page  Document lab facility information when provider is calling about annual lab orders. Delete facility wildcards when not needed.  Facility Name:    Facility Location: [place], [place]  [place]  Callback needed? If needed   Patient will be there 7/2/20

## 2020-08-04 DIAGNOSIS — Z48.298 AFTERCARE FOLLOWING ORGAN TRANSPLANT: ICD-10-CM

## 2020-08-04 LAB
ANION GAP SERPL CALCULATED.3IONS-SCNC: 4 MMOL/L (ref 3–14)
BUN SERPL-MCNC: 28 MG/DL (ref 7–30)
CALCIUM SERPL-MCNC: 9.4 MG/DL (ref 8.5–10.1)
CHLORIDE SERPL-SCNC: 109 MMOL/L (ref 94–109)
CO2 SERPL-SCNC: 25 MMOL/L (ref 20–32)
CREAT SERPL-MCNC: 1.12 MG/DL (ref 0.52–1.04)
CREAT UR-MCNC: 98 MG/DL
ERYTHROCYTE [DISTWIDTH] IN BLOOD BY AUTOMATED COUNT: 13 % (ref 10–15)
GFR SERPL CREATININE-BSD FRML MDRD: 51 ML/MIN/{1.73_M2}
GLUCOSE SERPL-MCNC: 154 MG/DL (ref 70–99)
HCT VFR BLD AUTO: 41.1 % (ref 35–47)
HGB BLD-MCNC: 13.7 G/DL (ref 11.7–15.7)
MCH RBC QN AUTO: 33.9 PG (ref 26.5–33)
MCHC RBC AUTO-ENTMCNC: 33.3 G/DL (ref 31.5–36.5)
MCV RBC AUTO: 102 FL (ref 78–100)
PLATELET # BLD AUTO: 235 10E9/L (ref 150–450)
POTASSIUM SERPL-SCNC: 4.9 MMOL/L (ref 3.4–5.3)
PROT UR-MCNC: 0.11 G/L
PROT/CREAT 24H UR: 0.11 G/G CR (ref 0–0.2)
RBC # BLD AUTO: 4.04 10E12/L (ref 3.8–5.2)
SODIUM SERPL-SCNC: 138 MMOL/L (ref 133–144)
TACROLIMUS BLD-MCNC: 4.8 UG/L (ref 5–15)
TME LAST DOSE: ABNORMAL H
WBC # BLD AUTO: 7.2 10E9/L (ref 4–11)

## 2020-08-04 PROCEDURE — 80048 BASIC METABOLIC PNL TOTAL CA: CPT | Performed by: INTERNAL MEDICINE

## 2020-08-04 PROCEDURE — 36415 COLL VENOUS BLD VENIPUNCTURE: CPT | Performed by: INTERNAL MEDICINE

## 2020-08-04 PROCEDURE — 80197 ASSAY OF TACROLIMUS: CPT | Performed by: INTERNAL MEDICINE

## 2020-08-04 PROCEDURE — 84156 ASSAY OF PROTEIN URINE: CPT | Performed by: INTERNAL MEDICINE

## 2020-08-04 PROCEDURE — 85027 COMPLETE CBC AUTOMATED: CPT | Performed by: INTERNAL MEDICINE

## 2020-08-05 ENCOUNTER — TELEPHONE (OUTPATIENT)
Dept: TRANSPLANT | Facility: CLINIC | Age: 65
End: 2020-08-05

## 2020-08-05 DIAGNOSIS — Z94.0 KIDNEY TRANSPLANTED: Primary | ICD-10-CM

## 2020-08-05 NOTE — TELEPHONE ENCOUNTER
Pt returned call.     Reports she did test + for COVID on 6/24. Daughter and  also tested positive.   Reports she had no respiratory symptoms, did have difficulty sleeping, tingling nerve pain, achy, fever x 2 days towards the end.   Symptoms resolved after 3 weeks, has been feeling well now for 3 weeks.  Lab results yesterday stable.     Will review pt in COVID meeting today.

## 2020-08-05 NOTE — LETTER
PHYSICIAN ORDERS      DATE & TIME ISSUED: 2020 4:54 PM  PATIENT NAME: Eliana Jane   : 1955     [facility] MR# [if applicable]: [id number]     DIAGNOSIS:  Kidney transplant, Immunosuppressed status  ICD-10 CODE: Z94.0,  D89.9    Please collect a follow-up COVID-19 PCR on patient    Any questions please call: Transplant Office [phone] option # 5    Fax results to:   [phone].    .

## 2020-08-05 NOTE — TELEPHONE ENCOUNTER
ISSUE:   Per chart review, pt tested + COVID-19 on 6/24 ([facility] results)    PLAN:  Call pt for general health update    OUTCOME:  Call placed to patient to check in, LVM to return call to transplant office.

## 2020-08-05 NOTE — TELEPHONE ENCOUNTER
Patient Call: Voicemail  Date/Time: 080520 10:27 am  Reason for call: Patient returning Callie's call re: bloodwork, please call her back at [phone]

## 2020-08-05 NOTE — TELEPHONE ENCOUNTER
Post Kidney and Pancreas COVID Conference      Verbal Plan Read Back:   Recheck PCR    Routed to RN Coordinator   Callie Vasquez, RN    Call placed to patient.   Explained plan to repeat COVID PCR to ensure that she is now negative. Pt v/u. She has a lab appointment at [facility] tomorrow, will see if she can get swab done. Order faxed to lab [phone]. If pt unable to get it done, requested pt return call to transplant office to have it ordered at [place].

## 2020-08-06 ENCOUNTER — TELEPHONE (OUTPATIENT)
Dept: TRANSPLANT | Facility: CLINIC | Age: 65
End: 2020-08-06

## 2020-08-06 NOTE — TELEPHONE ENCOUNTER
Patient Call: General    Reason for call: Eliana calling for Callie, Coordinator. Eliana had labs done for kidney panels several days ago and was told everything is fine. She then had more labs done today for  for diabetes.  wanted to give Eliaan Yusraan (spelling?) but wanted her to have labs done first which she did. They called her and said potassium is too high at 5.4. It was 4.9 when Eliana had labs that you require for kidney checks 2 days ago. They want her to take liquid medication and have labs redone on Monday.  She wants to discuss this w/Callie or someone.     Call back needed? Yes    Return Call Needed  Same as documented in contacts section  When to return call?: Same day: Route High Priority

## 2020-08-07 NOTE — TELEPHONE ENCOUNTER
RNCC spoke with Eliana, who reports that her endocrinologist would like her to take her kayexalate for a potassium of 5.4.    RNCC advised that it is okay for her to take kayexalate as directed by her endocrinologist.

## 2020-08-21 ENCOUNTER — TELEPHONE (OUTPATIENT)
Dept: TRANSPLANT | Facility: CLINIC | Age: 65
End: 2020-08-21

## 2020-08-21 DIAGNOSIS — Z48.298 AFTERCARE FOLLOWING ORGAN TRANSPLANT: ICD-10-CM

## 2020-08-21 DIAGNOSIS — Z94.0 KIDNEY TRANSPLANTED: Primary | ICD-10-CM

## 2020-08-21 NOTE — TELEPHONE ENCOUNTER
Patient Call: General    Reason for call: Eliana wanted to let you know that she had COVID testing and it came back negative. You said the nephrologist there wanted her to have a recheck of her COVID status. Eliana went this week. She does not have it now. The other thing is that Dr. Flowers (spelling?) who is  maybe; Eliana can't remember. He handles diabetes. Dr. Flowers wants Eliana to start on Jardian for her diabetes because her blood levels have been high - under 200 but not real consistent. She is going to start Jardian tomorrow. Dr. Flowers said Eliana needs to drink tons of water and probably have her kidney test like she normally does every 3 months, maybe do it in a month.     Call back needed? Yes    Return Call Needed  Same as documented in contacts section  When to return call?: Greater than one day: Route standard priority

## 2020-08-25 NOTE — TELEPHONE ENCOUNTER
Call returned to patient. Ok to start Jardiance as long as GFR > 45. Will check monthly labs x2. Pt v/u.

## 2020-09-23 DIAGNOSIS — Z94.0 KIDNEY TRANSPLANTED: Primary | ICD-10-CM

## 2020-09-23 DIAGNOSIS — Z94.0 KIDNEY REPLACED BY TRANSPLANT: ICD-10-CM

## 2020-09-23 RX ORDER — PREDNISONE 5 MG/1
5 TABLET ORAL DAILY
Qty: 30 TABLET | Refills: 11 | OUTPATIENT
Start: 2020-09-23

## 2020-09-23 RX ORDER — TACROLIMUS 1 MG/1
1 CAPSULE, GELATIN COATED ORAL 2 TIMES DAILY
Qty: 60 CAPSULE | Refills: 11 | OUTPATIENT
Start: 2020-09-23

## 2020-09-23 RX ORDER — MYCOPHENOLATE MOFETIL 250 MG/1
500 CAPSULE ORAL 2 TIMES DAILY
Qty: 120 CAPSULE | Refills: 11 | OUTPATIENT
Start: 2020-09-23

## 2020-09-23 RX ORDER — TACROLIMUS 0.5 MG/1
0.5 CAPSULE, GELATIN COATED ORAL 2 TIMES DAILY
Qty: 60 CAPSULE | Refills: 11 | OUTPATIENT
Start: 2020-09-23

## 2020-09-25 DIAGNOSIS — Z94.0 KIDNEY TRANSPLANTED: ICD-10-CM

## 2020-09-25 DIAGNOSIS — Z94.0 KIDNEY REPLACED BY TRANSPLANT: ICD-10-CM

## 2020-09-25 RX ORDER — TACROLIMUS 1 MG/1
1 CAPSULE, GELATIN COATED ORAL 2 TIMES DAILY
Qty: 60 CAPSULE | Refills: 6 | Status: SHIPPED | OUTPATIENT
Start: 2020-09-25 | End: 2021-01-27

## 2020-09-25 RX ORDER — PREDNISONE 5 MG/1
5 TABLET ORAL DAILY
Qty: 30 TABLET | Refills: 11 | Status: SHIPPED | OUTPATIENT
Start: 2020-09-25 | End: 2021-10-25

## 2020-09-25 RX ORDER — MYCOPHENOLATE MOFETIL 250 MG/1
500 CAPSULE ORAL 2 TIMES DAILY
Qty: 120 CAPSULE | Refills: 11 | Status: SHIPPED | OUTPATIENT
Start: 2020-09-25 | End: 2021-12-07

## 2020-09-25 RX ORDER — TACROLIMUS 0.5 MG/1
0.5 CAPSULE, GELATIN COATED ORAL 2 TIMES DAILY
Qty: 60 CAPSULE | Refills: 11 | Status: SHIPPED | OUTPATIENT
Start: 2020-09-25 | End: 2021-01-27

## 2020-10-05 ENCOUNTER — DOCUMENTATION ONLY (OUTPATIENT)
Dept: CARE COORDINATION | Facility: CLINIC | Age: 65
End: 2020-10-05

## 2020-11-14 DIAGNOSIS — Z48.298 AFTERCARE FOLLOWING ORGAN TRANSPLANT: ICD-10-CM

## 2020-11-14 LAB
ERYTHROCYTE [DISTWIDTH] IN BLOOD BY AUTOMATED COUNT: 11.9 % (ref 10–15)
HCT VFR BLD AUTO: 42.8 % (ref 35–47)
HGB BLD-MCNC: 14.6 G/DL (ref 11.7–15.7)
MCH RBC QN AUTO: 34.4 PG (ref 26.5–33)
MCHC RBC AUTO-ENTMCNC: 34.1 G/DL (ref 31.5–36.5)
MCV RBC AUTO: 101 FL (ref 78–100)
PLATELET # BLD AUTO: 192 10E9/L (ref 150–450)
RBC # BLD AUTO: 4.25 10E12/L (ref 3.8–5.2)
WBC # BLD AUTO: 6.2 10E9/L (ref 4–11)

## 2020-11-14 PROCEDURE — 85027 COMPLETE CBC AUTOMATED: CPT | Performed by: INTERNAL MEDICINE

## 2020-11-14 PROCEDURE — 80048 BASIC METABOLIC PNL TOTAL CA: CPT | Performed by: INTERNAL MEDICINE

## 2020-11-14 PROCEDURE — 36415 COLL VENOUS BLD VENIPUNCTURE: CPT | Performed by: INTERNAL MEDICINE

## 2020-11-14 PROCEDURE — 80197 ASSAY OF TACROLIMUS: CPT | Performed by: INTERNAL MEDICINE

## 2020-11-15 LAB
TACROLIMUS BLD-MCNC: 3.3 UG/L (ref 5–15)
TME LAST DOSE: ABNORMAL H

## 2020-11-16 ENCOUNTER — TELEPHONE (OUTPATIENT)
Dept: TRANSPLANT | Facility: CLINIC | Age: 65
End: 2020-11-16

## 2020-11-16 DIAGNOSIS — Z94.0 KIDNEY TRANSPLANTED: Primary | ICD-10-CM

## 2020-11-16 LAB
ANION GAP SERPL CALCULATED.3IONS-SCNC: 5 MMOL/L (ref 3–14)
BUN SERPL-MCNC: 24 MG/DL (ref 7–30)
CALCIUM SERPL-MCNC: 9 MG/DL (ref 8.5–10.1)
CHLORIDE SERPL-SCNC: 107 MMOL/L (ref 94–109)
CO2 SERPL-SCNC: 25 MMOL/L (ref 20–32)
CREAT SERPL-MCNC: 1.13 MG/DL (ref 0.52–1.04)
GFR SERPL CREATININE-BSD FRML MDRD: 51 ML/MIN/{1.73_M2}
GLUCOSE SERPL-MCNC: 221 MG/DL (ref 70–99)
POTASSIUM SERPL-SCNC: 4.8 MMOL/L (ref 3.4–5.3)
SODIUM SERPL-SCNC: 137 MMOL/L (ref 133–144)

## 2020-11-17 DIAGNOSIS — Z94.0 KIDNEY TRANSPLANTED: Primary | ICD-10-CM

## 2020-11-17 NOTE — TELEPHONE ENCOUNTER
ISSUE:   Tacrolimus IR level 3.3 on 11/14, goal 4-6, dose 1.5 mg BID.    PLAN:   Please call patient and confirm this was an accurate 12-hour trough. Verify Tacrolimus IR dose 1.5 mg BID. Confirm no new medications or illness. Confirm no missed doses. If accurate trough and accurate dose, stay on the same dose Tacrolimus IR and repeat labs in 1-2 weeks    OUTCOME:   Call placed to pt. LVM to return call to transplant office.    ADDENDUM:    Pt returned call. Confirmed tac dose and trough. Denies any recent illness. States she did recently increase glimepiride dose to 2 or 3 mg? Is also on Jardiance 10 mg. Instructed pt to remain at current dose and repeat level in 1-2 weeks. Will review meds with pharmacist.

## 2020-12-06 ENCOUNTER — HEALTH MAINTENANCE LETTER (OUTPATIENT)
Age: 65
End: 2020-12-06

## 2020-12-07 ENCOUNTER — VIRTUAL VISIT (OUTPATIENT)
Dept: NEPHROLOGY | Facility: CLINIC | Age: 65
End: 2020-12-07
Attending: INTERNAL MEDICINE
Payer: COMMERCIAL

## 2020-12-07 DIAGNOSIS — E11.69 TYPE 2 DIABETES MELLITUS WITH OTHER SPECIFIED COMPLICATION, WITHOUT LONG-TERM CURRENT USE OF INSULIN (H): Primary | ICD-10-CM

## 2020-12-07 DIAGNOSIS — E55.9 VITAMIN D DEFICIENCY: ICD-10-CM

## 2020-12-07 DIAGNOSIS — N18.31 STAGE 3A CHRONIC KIDNEY DISEASE (H): ICD-10-CM

## 2020-12-07 DIAGNOSIS — Z48.298 AFTERCARE FOLLOWING ORGAN TRANSPLANT: ICD-10-CM

## 2020-12-07 DIAGNOSIS — D84.9 IMMUNOSUPPRESSION (H): ICD-10-CM

## 2020-12-07 DIAGNOSIS — Z94.0 KIDNEY REPLACED BY TRANSPLANT: ICD-10-CM

## 2020-12-07 PROBLEM — E11.9 DIABETES MELLITUS, TYPE 2 (H): Status: ACTIVE | Noted: 2020-12-07

## 2020-12-07 PROBLEM — N18.30 CHRONIC KIDNEY DISEASE, STAGE 3 (H): Status: ACTIVE | Noted: 2020-12-07

## 2020-12-07 PROCEDURE — 99214 OFFICE O/P EST MOD 30 MIN: CPT | Mod: 95

## 2020-12-07 RX ORDER — LEVOTHYROXINE SODIUM 50 UG/1
50 TABLET ORAL DAILY
COMMUNITY
Start: 2020-12-07

## 2020-12-07 RX ORDER — GLIMEPIRIDE 1 MG/1
3 TABLET ORAL
COMMUNITY
Start: 2020-12-07

## 2020-12-07 RX ORDER — CITALOPRAM HYDROBROMIDE 20 MG/1
40 TABLET ORAL DAILY
COMMUNITY
Start: 2020-12-07

## 2020-12-07 NOTE — LETTER
Date:December 9, 2020      Patient was self referred, no letter generated. Do not send.        [facility] Health Information

## 2020-12-07 NOTE — LETTER
"12/7/2020      RE: Eliana Jane  [address]  [place] [address]       Eliana Jane is a 65 year old female who is being evaluated via a billable video visit.      The patient has been notified of following:     \"This video visit will be conducted via a call between you and your physician/provider. We have found that certain health care needs can be provided without the need for an in-person physical exam.  This service lets us provide the care you need with a video conversation.  If a prescription is necessary we can send it directly to your pharmacy.  If lab work is needed we can place an order for that and you can then stop by our lab to have the test done at a later time.    Video visits are billed at different rates depending on your insurance coverage.  Please reach out to your insurance provider with any questions.    If during the course of the call the physician/provider feels a video visit is not appropriate, you will not be charged for this service.\"    Patient has given verbal consent for Video visit? Yes  How would you like to obtain your AVS? Mail a copy  If you are dropped from the video visit, the video invite should be resent to:  Will anyone else be joining your video visit? No    Video-Visit Details    Type of service:  Video Visit    Video Start Time: 1608  Video End Time: 1635    Originating Location (pt. Location): Home    Distant Location (provider location):  [facility] [place]     Platform used for Video Visit: David Morrow MD      CHRONIC TRANSPLANT NEPHROLOGY VISIT    Assessment & Plan   # LDKT: Stable   - Baseline Creatinine:  ~ 1.1-1.3   - Proteinuria: Normal (<0.2 grams)   - Date DSA Last Checked: Aug/2008      Latest DSA: Not checked recently due to time from transplant   - BK Viremia: Not checked recently due to time from transplant   - Kidney Tx Biopsy: Oct 19, 2007; Result: Borderline acute " cellular-mediated rejection.  Minimal interstitial fibrosis and tubular atrophy.             Sep 20, 2007; Result: Borderline acute cellular-mediated rejection.             Sep 06, 2007; Result: Acute cellular-mediated rejection, [place] 1B.             Aug 24, 2007; Result: No diagnostic evidence of acute rejection.  Focal acute tubular injury.    # Immunosuppression: Tacrolimus immediate release (goal 4-6), Mycophenolate mofetil (dose 500 mg every 12 hours) and Prednisone (dose 5 mg daily)   - Changes: No    # Infection Prophylaxis:   - PJP: None    # Blood Pressure: Not checked recently;  Goal BP: < 130/80   - Changes: Not at this time, but recommend patient check BP at home.   - With DM, would recommend starting ARB, if BP is high or even in normal range.    # Diabetes: Borderline control (HbA1c 7-9%) Last HbA1c: 8.6%   - Management as per primary care.   - With DM, would recommend starting ARB, if BP is high or even in normal range.    # Mineral Bone Disorder:   - Vitamin D; level: Not checked recently        On supplement: No  - Calcium; level: Normal        On supplement: No    # Electrolytes:   - Potassium; level: Normal        On supplement: No  - Bicarbonate; level: Normal        On supplement: No    # GERD: Minimal symptoms on PPI.    # H/o Borderline Vitamin B12 Level: Patient has mild macrocytosis and h/o borderline vitamin B12 levels.   - Will check vitamin B12 with next labs.    # Skin Cancer Risk:    - Discussed sun protection and recommend regular follow up with Dermatology.    # Medical Compliance: Yes     # COVID-19 Virus Review: Discussed COVID-19 virus and the potential medical risks.  Reviewed preventative health recommendations, which includes washing hands for 20 seconds, avoid touching your face, and social distancing.  Asked patient to inform the transplant center if they are exposed or diagnosed with this virus.    # Transplant History:  Etiology of Kidney Failure: Unknown etiology  Tx:  LDKT  Transplant: 8/16/2007 (Kidney)  Donor Type: Living Donor Class: Standard Criteria Donor  Significant changes in immunosuppression: None  Significant transplant-related complications: Acute cellular-mediated rejection    Transplant Office Phone Number: [phone]    Assessment and plan was discussed with the patient and she voiced her understanding and agreement.    Return visit: Return in about 1 year (around 12/7/2021).    Henrique Morrow MD    Chief Complaint   Ms. Earl Jane is a 65 year old here for kidney transplant and immunosuppression management.    History of Present Illness    Ms. Earl Jane reports feeling okay overall with some medical complaints.  No recent hospitalizations, but she was diagnosed with COVID-19 this last June.  Patient says her daughter works in health care and likely got it first, then the whole family got COVID.  Patient had mostly mild symptoms with fatigue and body aches.  No fever, cough or shortness of breath.  She did not need to be hospitalized and her symptoms have resolved.  No other new medical issues.    Her energy level is okay, although not quite normal.  She mostly improved from COVID, but still isn't sleeping well.  Patient is very stressed as her  has Parkinson's and dementia and requires full time care.  They have a PCA come to the house during the day, but the patient and her daughter have to care for him in the evenings.  She is also working from home.  Patient is generally active, but really has been getting only minimal exercise.  Denies any chest pain or shortness of breath with exertion.  No leg swelling.    Appetite is good and she has actually lost ~ 10 lbs over the last year.  No nausea, vomiting or diarrhea.  Minimal heartburn symptoms as long as she takes omeprazole.  No fever, sweats or chills.    Recent Hospitalizations:  [x] No [] Yes    New Medical Issues: [x] No [] Yes    Decreased energy: [] No [x] Yes Slightly decreased    Chest pain or SOB with exertion:  [x] No [] Yes    Appetite change or weight change: [] No [x] Yes Lost ~ 10 lbs   Nausea, vomiting or diarrhea:  [x] No [] Yes    Fever, sweats or chills: [x] No [] Yes    Leg swelling: [x] No [] Yes      Home BP: Not checked    Review of Systems   A comprehensive review of systems was obtained and negative, except as noted in the HPI or PMH.    Problem List   Patient Active Problem List   Diagnosis     Aftercare following organ transplant     Kidney replaced by transplant     Reactive airway disease     Gout     Dyslipidemia     Diabetes mellitus, type 2 (H)     Chronic kidney disease, stage 3     GERD (gastroesophageal reflux disease)     Vitamin D deficiency     Immunosuppression (H)       Social History   Social History     Tobacco Use     Smoking status: Never Smoker     Smokeless tobacco: Never Used   Substance Use Topics     Alcohol use: Not on file     Drug use: Not on file       Allergies   Allergies   Allergen Reactions     Dust Mites Unknown     Trees      asthma       Medications   Current Outpatient Medications   Medication Sig     citalopram (CELEXA) 20 MG tablet Take 2 tablets (40 mg) by mouth daily     empagliflozin (JARDIANCE) 10 MG TABS tablet Take 1 tablet (10 mg) by mouth daily     glimepiride (AMARYL) 1 MG tablet Take 3 tablets (3 mg) by mouth every morning (before breakfast)     levothyroxine (SYNTHROID/LEVOTHROID) 50 MCG tablet Take 1 tablet (50 mcg) by mouth daily     Albuterol (VENTOLIN IN) Inhale 1 Inhaler into the lungs daily as needed.     CELLCEPT (BRAND) 250 MG capsule Take 2 capsules (500 mg) by mouth 2 times daily GENERIC     fluticasone (VERAMYST) 27.5 MCG/SPRAY spray Spray 2 sprays into both nostrils daily     Fluticasone-Salmeterol (ADVAIR DISKUS IN) Inhale 2 Inhalers into the lungs daily as needed.     mometasone (NASONEX) 50 MCG/ACT nasal spray Spray 2 sprays into both nostrils daily Reported on 4/7/2017     mometasone-formoterol (DULERA) 200-5  MCG/ACT oral inhaler Inhale 1 puff into the lungs 2 times daily Reported on 4/7/2017     omeprazole (PRILOSEC) 20 MG CR capsule Take 1 capsule (20 mg) by mouth daily     predniSONE (DELTASONE) 5 MG tablet Take 1 tablet (5 mg) by mouth daily     PROGRAF (BRAND) 0.5 MG capsule Take 1 capsule (0.5 mg) by mouth 2 times daily (total dose 1.5 mg twice daily) GENERIC     PROGRAF (BRAND) 1 MG capsule Take 1 capsule (1 mg) by mouth 2 times daily (total dose 1.5 mg twice daily) GENERIC     simvastatin (ZOCOR) 20 MG tablet Take 1 tablet (20 mg) by mouth every evening     tacrolimus (GENERIC EQUIVALENT) 1 MG capsule Take 1 capsule (1 mg) by mouth 2 times daily     No current facility-administered medications for this visit.      Medications Discontinued During This Encounter   Medication Reason     allopurinol (ZYLOPRIM) 100 MG tablet      hydrochlorothiazide (MICROZIDE) 12.5 MG capsule      IBANdronate (BONIVA) 3 MG/3ML injection      metoprolol (LOPRESSOR) 25 MG tablet      Calcium Carb-Cholecalciferol (CALCIUM 1000 + D PO)      metFORMIN (GLUCOPHAGE-XR) 500 MG 24 hr tablet        Physical Exam   Vital signs were deferred for this telemedicine visit.    GENERAL APPEARANCE: alert and no distress  HENT: no obvious abnormalities on appearance  RESP: breathing appears unremarkable with normal rate, no audible wheezing or cough and no apparent shortness of breath with conversation  MS: extremities normal - no gross deformities noted  SKIN: no apparent rash and normal skin tone  NEURO: speech is clear with no obvious neurological deficits  PSYCH: mentation appears normal and affect normal    Data     Renal Latest Ref Rng & Units 11/14/2020 8/4/2020 1/27/2020   Na 133 - 144 mmol/L 137 138 134   Na (external) 135 - 145 mmol/L - - -   K 3.4 - 5.3 mmol/L 4.8 4.9 5.0   K (external) 3.5 - 5.0 mmol/L - - -   Cl 94 - 109 mmol/L 107 109 106   Cl (external) 98 - 110 mmol/L - - -   CO2 20 - 32 mmol/L 25 25 27   CO2 (external) 21 - 31 mmol/L -  - -   BUN 7 - 30 mg/dL 24 28 19   BUN (external) 8 - 25 mg/dL - - -   Cr 0.52 - 1.04 mg/dL 1.13(H) 1.12(H) 1.16(H)   Cr (external) 0.57 - 1.11 mg/dL - - -   Glucose 70 - 99 mg/dL 221(H) 154(H) 233(H)   Glucose (external) 65 - 100 mg/dL - - -   Ca  8.5 - 10.1 mg/dL 9.0 9.4 9.8   Ca (external) 8.5 - 10.5 mg/dL - - -   Mg 1.6 - 2.3 mg/dL - - -   Mg (external) 1.6 - 2.6 mg/dL - - -     Bone Health Latest Ref Rng & Units 11/5/2010 2/26/2010 12/5/2009   Phos 2.5 - 4.5 mg/dL 2.9 3.0 3.2   PTHi 12 - 72 pg/mL - - -     Heme Latest Ref Rng & Units 11/14/2020 8/4/2020 1/27/2020   WBC 4.0 - 11.0 10e9/L 6.2 7.2 6.8   WBC (external) 4.5 - 11.0 thou/cu mm - - -   Hgb 11.7 - 15.7 g/dL 14.6 13.7 14.7   Hgb (external) 12.0 - 16.0 g/dL - - -   Plt 150 - 450 10e9/L 192 235 215   Plt (external) 140 - 440 thou/cu mm - - -   ABSOLUTE NEUTROPHIL 1.6 - 8.3 10e9/L - - -   ABSOLUTE NEUTROPHILS (EXTERNAL) 1.7 - 7.0 thou/cu mm - - -   ABSOLUTE LYMPHOCYTES 0.8 - 5.3 10e9/L - - -   ABSOLUTE LYMPHOCYTES (EXTERNAL) 0.9 - 2.9 thou/cu mm - - -   ABSOLUTE MONOCYTES 0.0 - 1.3 10e9/L - - -   ABSOLUTE MONOCYTES (EXTERNAL) <0.9 thou/cu mm - - -   ABSOLUTE EOSINOPHILS 0.0 - 0.7 10e9/L - - -   ABSOLUTE EOSINOPHILS (EXTERNAL) <0.5 thou/cu mm - - -   ABSOLUTE BASOPHILS 0.0 - 0.2 10e9/L - - -   ABSOLUTE BASOPHILS (EXTERNAL) <0.3 thou/cu mm - - -     Liver Latest Ref Rng & Units 12/24/2014 11/5/2010 2/26/2010   AP 40 - 150 U/L - - -   AP (external) 50 - 136 U/L 130 - -   TBili 0.2 - 1.3 mg/dL - - -   TBili (external) 0.2 - 1.2 mg/dL 0.7 - -   ALT 0 - 50 U/L - - -   ALT (external) 8 - 45 U/L 51(H) - -   AST 0 - 45 U/L - - -   AST (external) 2 - 40 U/L 27 - -   Tot Protein 6.8 - 8.8 g/dL - - -   Tot Protein (external) 6.0 - 8.0 g/dL 6.5 - -   Albumin 3.3 - 4.9 g/dL - 3.8 3.6   Albumin (external) 3.2 - 4.6 g/dL 3.8 - -     Pancreas Latest Ref Rng & Units 7/16/2019 12/24/2014 8/29/2007   A1C 0 - 5.6 % 8.6(H) - -   A1C (external) <=6.4 - 7.1(H) -   Amylase 30 - 110  U/L - - 57   Lipase 20 - 250 U/L - - 54     Iron studies Latest Ref Rng & Units 10/8/2007 8/28/2007   Iron 35 - 180 ug/dL 155 39   Ferritin 10 - 300 ng/mL 662(H) 287     [facility] Txp Virology Latest Ref Rng & Units 8/18/2009 7/10/2009 6/13/2008   CMV IgG EU/mL - - -   CVM DNA Quant - - - -   CMV Quant <100 Copies/mL - - -   CMV QT Log <2.0 Log copies/mL - - -   BK Spec - Plasma, EDTA anticoagulant Plasma, EDTA anticoagulant Plasma, EDTA anticoagulant   BK Res <1000 copies/mL <1000 <1000 <1000   BK Log <3.0 Log copies/mL <3.0 <3.0 <3.0   EBV IgG - - - -   Hep B Core NEG - - -   Hep B Surf 0.0 - 4.9 mIU/mL - - -   HIV 1&2 NEG - - -        Recent Labs   Lab Test 01/27/20  0846 08/04/20  0754 11/14/20  1022   DOSTAC 1/26/2020 2245 2005 08/03/2020 11/13/20 2300   TACROL 5.7 4.8* 3.3*           Henrique Morrow MD       left axilla has a lipoid shaped area of fluctuance with no erythema or warmth.

## 2020-12-07 NOTE — LETTER
"12/7/2020       RE: Eliana Jane  [address]  [place] [address]     Dear Colleague,    Thank you for referring your patient, Eliana Jane, to the [facility] [place] at [facility]. Please see a copy of my visit note below.    Eliana Jane is a 65 year old female who is being evaluated via a billable video visit.      The patient has been notified of following:     \"This video visit will be conducted via a call between you and your physician/provider. We have found that certain health care needs can be provided without the need for an in-person physical exam.  This service lets us provide the care you need with a video conversation.  If a prescription is necessary we can send it directly to your pharmacy.  If lab work is needed we can place an order for that and you can then stop by our lab to have the test done at a later time.    Video visits are billed at different rates depending on your insurance coverage.  Please reach out to your insurance provider with any questions.    If during the course of the call the physician/provider feels a video visit is not appropriate, you will not be charged for this service.\"    Patient has given verbal consent for Video visit? Yes  How would you like to obtain your AVS? Mail a copy  If you are dropped from the video visit, the video invite should be resent to:  Will anyone else be joining your video visit? No    Video-Visit Details    Type of service:  Video Visit    Video Start Time: 1608  Video End Time: 1635    Originating Location (pt. Location): Home    Distant Location (provider location):  [facility] [place]     Platform used for Video Visit: David Morrow MD      CHRONIC TRANSPLANT NEPHROLOGY VISIT    Assessment & Plan   # LDKT: Stable   - Baseline Creatinine:  ~ 1.1-1.3   - Proteinuria: Normal (<0.2 grams)   - " Date DSA Last Checked: Aug/2008      Latest DSA: Not checked recently due to time from transplant   - BK Viremia: Not checked recently due to time from transplant   - Kidney Tx Biopsy: Oct 19, 2007; Result: Borderline acute cellular-mediated rejection.  Minimal interstitial fibrosis and tubular atrophy.             Sep 20, 2007; Result: Borderline acute cellular-mediated rejection.             Sep 06, 2007; Result: Acute cellular-mediated rejection, [place] 1B.             Aug 24, 2007; Result: No diagnostic evidence of acute rejection.  Focal acute tubular injury.    # Immunosuppression: Tacrolimus immediate release (goal 4-6), Mycophenolate mofetil (dose 500 mg every 12 hours) and Prednisone (dose 5 mg daily)   - Changes: No    # Infection Prophylaxis:   - PJP: None    # Blood Pressure: Not checked recently;  Goal BP: < 130/80   - Changes: Not at this time, but recommend patient check BP at home.   - With DM, would recommend starting ARB, if BP is high or even in normal range.    # Diabetes: Borderline control (HbA1c 7-9%) Last HbA1c: 8.6%   - Management as per primary care.   - With DM, would recommend starting ARB, if BP is high or even in normal range.    # Mineral Bone Disorder:   - Vitamin D; level: Not checked recently        On supplement: No  - Calcium; level: Normal        On supplement: No    # Electrolytes:   - Potassium; level: Normal        On supplement: No  - Bicarbonate; level: Normal        On supplement: No    # GERD: Minimal symptoms on PPI.    # H/o Borderline Vitamin B12 Level: Patient has mild macrocytosis and h/o borderline vitamin B12 levels.   - Will check vitamin B12 with next labs.    # Skin Cancer Risk:    - Discussed sun protection and recommend regular follow up with Dermatology.    # Medical Compliance: Yes     # COVID-19 Virus Review: Discussed COVID-19 virus and the potential medical risks.  Reviewed preventative health recommendations, which includes washing hands for 20 seconds,  avoid touching your face, and social distancing.  Asked patient to inform the transplant center if they are exposed or diagnosed with this virus.    # Transplant History:  Etiology of Kidney Failure: Unknown etiology  Tx: LDKT  Transplant: 8/16/2007 (Kidney)  Donor Type: Living Donor Class: Standard Criteria Donor  Significant changes in immunosuppression: None  Significant transplant-related complications: Acute cellular-mediated rejection    Transplant Office Phone Number: [phone]    Assessment and plan was discussed with the patient and she voiced her understanding and agreement.    Return visit: Return in about 1 year (around 12/7/2021).    Henrique Morrow MD    Chief Complaint   Ms. Earl Jane is a 65 year old here for kidney transplant and immunosuppression management.    History of Present Illness    Ms. Earl Jane reports feeling okay overall with some medical complaints.  No recent hospitalizations, but she was diagnosed with COVID-19 this last June.  Patient says her daughter works in health care and likely got it first, then the whole family got COVID.  Patient had mostly mild symptoms with fatigue and body aches.  No fever, cough or shortness of breath.  She did not need to be hospitalized and her symptoms have resolved.  No other new medical issues.    Her energy level is okay, although not quite normal.  She mostly improved from COVID, but still isn't sleeping well.  Patient is very stressed as her  has Parkinson's and dementia and requires full time care.  They have a PCA come to the house during the day, but the patient and her daughter have to care for him in the evenings.  She is also working from home.  Patient is generally active, but really has been getting only minimal exercise.  Denies any chest pain or shortness of breath with exertion.  No leg swelling.    Appetite is good and she has actually lost ~ 10 lbs over the last year.  No nausea, vomiting or diarrhea.   Minimal heartburn symptoms as long as she takes omeprazole.  No fever, sweats or chills.    Recent Hospitalizations:  [x] No [] Yes    New Medical Issues: [x] No [] Yes    Decreased energy: [] No [x] Yes Slightly decreased   Chest pain or SOB with exertion:  [x] No [] Yes    Appetite change or weight change: [] No [x] Yes Lost ~ 10 lbs   Nausea, vomiting or diarrhea:  [x] No [] Yes    Fever, sweats or chills: [x] No [] Yes    Leg swelling: [x] No [] Yes      Home BP: Not checked    Review of Systems   A comprehensive review of systems was obtained and negative, except as noted in the HPI or PMH.    Problem List   Patient Active Problem List   Diagnosis     Aftercare following organ transplant     Kidney replaced by transplant     Reactive airway disease     Gout     Dyslipidemia     Diabetes mellitus, type 2 (H)     Chronic kidney disease, stage 3     GERD (gastroesophageal reflux disease)     Vitamin D deficiency     Immunosuppression (H)       Social History   Social History     Tobacco Use     Smoking status: Never Smoker     Smokeless tobacco: Never Used   Substance Use Topics     Alcohol use: Not on file     Drug use: Not on file       Allergies   Allergies   Allergen Reactions     Dust Mites Unknown     Trees      asthma       Medications   Current Outpatient Medications   Medication Sig     citalopram (CELEXA) 20 MG tablet Take 2 tablets (40 mg) by mouth daily     empagliflozin (JARDIANCE) 10 MG TABS tablet Take 1 tablet (10 mg) by mouth daily     glimepiride (AMARYL) 1 MG tablet Take 3 tablets (3 mg) by mouth every morning (before breakfast)     levothyroxine (SYNTHROID/LEVOTHROID) 50 MCG tablet Take 1 tablet (50 mcg) by mouth daily     Albuterol (VENTOLIN IN) Inhale 1 Inhaler into the lungs daily as needed.     CELLCEPT (BRAND) 250 MG capsule Take 2 capsules (500 mg) by mouth 2 times daily GENERIC     fluticasone (VERAMYST) 27.5 MCG/SPRAY spray Spray 2 sprays into both nostrils daily      Fluticasone-Salmeterol (ADVAIR DISKUS IN) Inhale 2 Inhalers into the lungs daily as needed.     mometasone (NASONEX) 50 MCG/ACT nasal spray Spray 2 sprays into both nostrils daily Reported on 4/7/2017     mometasone-formoterol (DULERA) 200-5 MCG/ACT oral inhaler Inhale 1 puff into the lungs 2 times daily Reported on 4/7/2017     omeprazole (PRILOSEC) 20 MG CR capsule Take 1 capsule (20 mg) by mouth daily     predniSONE (DELTASONE) 5 MG tablet Take 1 tablet (5 mg) by mouth daily     PROGRAF (BRAND) 0.5 MG capsule Take 1 capsule (0.5 mg) by mouth 2 times daily (total dose 1.5 mg twice daily) GENERIC     PROGRAF (BRAND) 1 MG capsule Take 1 capsule (1 mg) by mouth 2 times daily (total dose 1.5 mg twice daily) GENERIC     simvastatin (ZOCOR) 20 MG tablet Take 1 tablet (20 mg) by mouth every evening     tacrolimus (GENERIC EQUIVALENT) 1 MG capsule Take 1 capsule (1 mg) by mouth 2 times daily     No current facility-administered medications for this visit.      Medications Discontinued During This Encounter   Medication Reason     allopurinol (ZYLOPRIM) 100 MG tablet      hydrochlorothiazide (MICROZIDE) 12.5 MG capsule      IBANdronate (BONIVA) 3 MG/3ML injection      metoprolol (LOPRESSOR) 25 MG tablet      Calcium Carb-Cholecalciferol (CALCIUM 1000 + D PO)      metFORMIN (GLUCOPHAGE-XR) 500 MG 24 hr tablet        Physical Exam   Vital signs were deferred for this telemedicine visit.    GENERAL APPEARANCE: alert and no distress  HENT: no obvious abnormalities on appearance  RESP: breathing appears unremarkable with normal rate, no audible wheezing or cough and no apparent shortness of breath with conversation  MS: extremities normal - no gross deformities noted  SKIN: no apparent rash and normal skin tone  NEURO: speech is clear with no obvious neurological deficits  PSYCH: mentation appears normal and affect normal    Data     Renal Latest Ref Rng & Units 11/14/2020 8/4/2020 1/27/2020   Na 133 - 144 mmol/L 137 138 134    Na (external) 135 - 145 mmol/L - - -   K 3.4 - 5.3 mmol/L 4.8 4.9 5.0   K (external) 3.5 - 5.0 mmol/L - - -   Cl 94 - 109 mmol/L 107 109 106   Cl (external) 98 - 110 mmol/L - - -   CO2 20 - 32 mmol/L 25 25 27   CO2 (external) 21 - 31 mmol/L - - -   BUN 7 - 30 mg/dL 24 28 19   BUN (external) 8 - 25 mg/dL - - -   Cr 0.52 - 1.04 mg/dL 1.13(H) 1.12(H) 1.16(H)   Cr (external) 0.57 - 1.11 mg/dL - - -   Glucose 70 - 99 mg/dL 221(H) 154(H) 233(H)   Glucose (external) 65 - 100 mg/dL - - -   Ca  8.5 - 10.1 mg/dL 9.0 9.4 9.8   Ca (external) 8.5 - 10.5 mg/dL - - -   Mg 1.6 - 2.3 mg/dL - - -   Mg (external) 1.6 - 2.6 mg/dL - - -     Bone Health Latest Ref Rng & Units 11/5/2010 2/26/2010 12/5/2009   Phos 2.5 - 4.5 mg/dL 2.9 3.0 3.2   PTHi 12 - 72 pg/mL - - -     Heme Latest Ref Rng & Units 11/14/2020 8/4/2020 1/27/2020   WBC 4.0 - 11.0 10e9/L 6.2 7.2 6.8   WBC (external) 4.5 - 11.0 thou/cu mm - - -   Hgb 11.7 - 15.7 g/dL 14.6 13.7 14.7   Hgb (external) 12.0 - 16.0 g/dL - - -   Plt 150 - 450 10e9/L 192 235 215   Plt (external) 140 - 440 thou/cu mm - - -   ABSOLUTE NEUTROPHIL 1.6 - 8.3 10e9/L - - -   ABSOLUTE NEUTROPHILS (EXTERNAL) 1.7 - 7.0 thou/cu mm - - -   ABSOLUTE LYMPHOCYTES 0.8 - 5.3 10e9/L - - -   ABSOLUTE LYMPHOCYTES (EXTERNAL) 0.9 - 2.9 thou/cu mm - - -   ABSOLUTE MONOCYTES 0.0 - 1.3 10e9/L - - -   ABSOLUTE MONOCYTES (EXTERNAL) <0.9 thou/cu mm - - -   ABSOLUTE EOSINOPHILS 0.0 - 0.7 10e9/L - - -   ABSOLUTE EOSINOPHILS (EXTERNAL) <0.5 thou/cu mm - - -   ABSOLUTE BASOPHILS 0.0 - 0.2 10e9/L - - -   ABSOLUTE BASOPHILS (EXTERNAL) <0.3 thou/cu mm - - -     Liver Latest Ref Rng & Units 12/24/2014 11/5/2010 2/26/2010   AP 40 - 150 U/L - - -   AP (external) 50 - 136 U/L 130 - -   TBili 0.2 - 1.3 mg/dL - - -   TBili (external) 0.2 - 1.2 mg/dL 0.7 - -   ALT 0 - 50 U/L - - -   ALT (external) 8 - 45 U/L 51(H) - -   AST 0 - 45 U/L - - -   AST (external) 2 - 40 U/L 27 - -   Tot Protein 6.8 - 8.8 g/dL - - -   Tot Protein (external) 6.0  - 8.0 g/dL 6.5 - -   Albumin 3.3 - 4.9 g/dL - 3.8 3.6   Albumin (external) 3.2 - 4.6 g/dL 3.8 - -     Pancreas Latest Ref Rng & Units 7/16/2019 12/24/2014 8/29/2007   A1C 0 - 5.6 % 8.6(H) - -   A1C (external) <=6.4 - 7.1(H) -   Amylase 30 - 110 U/L - - 57   Lipase 20 - 250 U/L - - 54     Iron studies Latest Ref Rng & Units 10/8/2007 8/28/2007   Iron 35 - 180 ug/dL 155 39   Ferritin 10 - 300 ng/mL 662(H) 287     [facility] Txp Virology Latest Ref Rng & Units 8/18/2009 7/10/2009 6/13/2008   CMV IgG EU/mL - - -   CVM DNA Quant - - - -   CMV Quant <100 Copies/mL - - -   CMV QT Log <2.0 Log copies/mL - - -   BK Spec - Plasma, EDTA anticoagulant Plasma, EDTA anticoagulant Plasma, EDTA anticoagulant   BK Res <1000 copies/mL <1000 <1000 <1000   BK Log <3.0 Log copies/mL <3.0 <3.0 <3.0   EBV IgG - - - -   Hep B Core NEG - - -   Hep B Surf 0.0 - 4.9 mIU/mL - - -   HIV 1&2 NEG - - -        Recent Labs   Lab Test 01/27/20  0846 08/04/20  0754 11/14/20  1022   DOSTAC 1/26/2020 2245 2005 08/03/2020 11/13/20 2300   TACROL 5.7 4.8* 3.3*             Again, thank you for allowing me to participate in the care of your patient.      Sincerely,    Kidney/Pancreas Recipient

## 2020-12-07 NOTE — PROGRESS NOTES
"Eliana Jane is a 65 year old female who is being evaluated via a billable video visit.      The patient has been notified of following:     \"This video visit will be conducted via a call between you and your physician/provider. We have found that certain health care needs can be provided without the need for an in-person physical exam.  This service lets us provide the care you need with a video conversation.  If a prescription is necessary we can send it directly to your pharmacy.  If lab work is needed we can place an order for that and you can then stop by our lab to have the test done at a later time.    Video visits are billed at different rates depending on your insurance coverage.  Please reach out to your insurance provider with any questions.    If during the course of the call the physician/provider feels a video visit is not appropriate, you will not be charged for this service.\"    Patient has given verbal consent for Video visit? Yes  How would you like to obtain your AVS? Mail a copy  If you are dropped from the video visit, the video invite should be resent to:  Will anyone else be joining your video visit? No    Video-Visit Details    Type of service:  Video Visit    Video Start Time: 1608  Video End Time: 1635    Originating Location (pt. Location): Home    Distant Location (provider location):  [facility] [place]     Platform used for Video Visit: David Morrow MD      CHRONIC TRANSPLANT NEPHROLOGY VISIT    Assessment & Plan   # LDKT: Stable   - Baseline Creatinine:  ~ 1.1-1.3   - Proteinuria: Normal (<0.2 grams)   - Date DSA Last Checked: Aug/2008      Latest DSA: Not checked recently due to time from transplant   - BK Viremia: Not checked recently due to time from transplant   - Kidney Tx Biopsy: Oct 19, 2007; Result: Borderline acute cellular-mediated rejection.  Minimal interstitial fibrosis and tubular atrophy.             Sep 20, " 2007; Result: Borderline acute cellular-mediated rejection.             Sep 06, 2007; Result: Acute cellular-mediated rejection, [place] 1B.             Aug 24, 2007; Result: No diagnostic evidence of acute rejection.  Focal acute tubular injury.    # Immunosuppression: Tacrolimus immediate release (goal 4-6), Mycophenolate mofetil (dose 500 mg every 12 hours) and Prednisone (dose 5 mg daily)   - Changes: No    # Infection Prophylaxis:   - PJP: None    # Blood Pressure: Not checked recently;  Goal BP: < 130/80   - Changes: Not at this time, but recommend patient check BP at home.   - With DM, would recommend starting ARB, if BP is high or even in normal range.    # Diabetes: Borderline control (HbA1c 7-9%) Last HbA1c: 8.6%   - Management as per primary care.   - With DM, would recommend starting ARB, if BP is high or even in normal range.    # Mineral Bone Disorder:   - Vitamin D; level: Not checked recently        On supplement: No  - Calcium; level: Normal        On supplement: No    # Electrolytes:   - Potassium; level: Normal        On supplement: No  - Bicarbonate; level: Normal        On supplement: No    # GERD: Minimal symptoms on PPI.    # H/o Borderline Vitamin B12 Level: Patient has mild macrocytosis and h/o borderline vitamin B12 levels.   - Will check vitamin B12 with next labs.    # Skin Cancer Risk:    - Discussed sun protection and recommend regular follow up with Dermatology.    # Medical Compliance: Yes     # COVID-19 Virus Review: Discussed COVID-19 virus and the potential medical risks.  Reviewed preventative health recommendations, which includes washing hands for 20 seconds, avoid touching your face, and social distancing.  Asked patient to inform the transplant center if they are exposed or diagnosed with this virus.    # Transplant History:  Etiology of Kidney Failure: Unknown etiology  Tx: LDKT  Transplant: 8/16/2007 (Kidney)  Donor Type: Living Donor Class: Standard Criteria  Donor  Significant changes in immunosuppression: None  Significant transplant-related complications: Acute cellular-mediated rejection    Transplant Office Phone Number: [phone]    Assessment and plan was discussed with the patient and she voiced her understanding and agreement.    Return visit: Return in about 1 year (around 12/7/2021).    Henrique Morrow MD    Chief Complaint   Ms. Earl Jane is a 65 year old here for kidney transplant and immunosuppression management.    History of Present Illness    Ms. Earl Jane reports feeling okay overall with some medical complaints.  No recent hospitalizations, but she was diagnosed with COVID-19 this last June.  Patient says her daughter works in health care and likely got it first, then the whole family got COVID.  Patient had mostly mild symptoms with fatigue and body aches.  No fever, cough or shortness of breath.  She did not need to be hospitalized and her symptoms have resolved.  No other new medical issues.    Her energy level is okay, although not quite normal.  She mostly improved from COVID, but still isn't sleeping well.  Patient is very stressed as her  has Parkinson's and dementia and requires full time care.  They have a PCA come to the house during the day, but the patient and her daughter have to care for him in the evenings.  She is also working from home.  Patient is generally active, but really has been getting only minimal exercise.  Denies any chest pain or shortness of breath with exertion.  No leg swelling.    Appetite is good and she has actually lost ~ 10 lbs over the last year.  No nausea, vomiting or diarrhea.  Minimal heartburn symptoms as long as she takes omeprazole.  No fever, sweats or chills.    Recent Hospitalizations:  [x] No [] Yes    New Medical Issues: [x] No [] Yes    Decreased energy: [] No [x] Yes Slightly decreased   Chest pain or SOB with exertion:  [x] No [] Yes    Appetite change or weight change: []  No [x] Yes Lost ~ 10 lbs   Nausea, vomiting or diarrhea:  [x] No [] Yes    Fever, sweats or chills: [x] No [] Yes    Leg swelling: [x] No [] Yes      Home BP: Not checked    Review of Systems   A comprehensive review of systems was obtained and negative, except as noted in the HPI or PMH.    Problem List   Patient Active Problem List   Diagnosis     Aftercare following organ transplant     Kidney replaced by transplant     Reactive airway disease     Gout     Dyslipidemia     Diabetes mellitus, type 2 (H)     Chronic kidney disease, stage 3     GERD (gastroesophageal reflux disease)     Vitamin D deficiency     Immunosuppression (H)       Social History   Social History     Tobacco Use     Smoking status: Never Smoker     Smokeless tobacco: Never Used   Substance Use Topics     Alcohol use: Not on file     Drug use: Not on file       Allergies   Allergies   Allergen Reactions     Dust Mites Unknown     Trees      asthma       Medications   Current Outpatient Medications   Medication Sig     citalopram (CELEXA) 20 MG tablet Take 2 tablets (40 mg) by mouth daily     empagliflozin (JARDIANCE) 10 MG TABS tablet Take 1 tablet (10 mg) by mouth daily     glimepiride (AMARYL) 1 MG tablet Take 3 tablets (3 mg) by mouth every morning (before breakfast)     levothyroxine (SYNTHROID/LEVOTHROID) 50 MCG tablet Take 1 tablet (50 mcg) by mouth daily     Albuterol (VENTOLIN IN) Inhale 1 Inhaler into the lungs daily as needed.     CELLCEPT (BRAND) 250 MG capsule Take 2 capsules (500 mg) by mouth 2 times daily GENERIC     fluticasone (VERAMYST) 27.5 MCG/SPRAY spray Spray 2 sprays into both nostrils daily     Fluticasone-Salmeterol (ADVAIR DISKUS IN) Inhale 2 Inhalers into the lungs daily as needed.     mometasone (NASONEX) 50 MCG/ACT nasal spray Spray 2 sprays into both nostrils daily Reported on 4/7/2017     mometasone-formoterol (DULERA) 200-5 MCG/ACT oral inhaler Inhale 1 puff into the lungs 2 times daily Reported on 4/7/2017      omeprazole (PRILOSEC) 20 MG CR capsule Take 1 capsule (20 mg) by mouth daily     predniSONE (DELTASONE) 5 MG tablet Take 1 tablet (5 mg) by mouth daily     PROGRAF (BRAND) 0.5 MG capsule Take 1 capsule (0.5 mg) by mouth 2 times daily (total dose 1.5 mg twice daily) GENERIC     PROGRAF (BRAND) 1 MG capsule Take 1 capsule (1 mg) by mouth 2 times daily (total dose 1.5 mg twice daily) GENERIC     simvastatin (ZOCOR) 20 MG tablet Take 1 tablet (20 mg) by mouth every evening     tacrolimus (GENERIC EQUIVALENT) 1 MG capsule Take 1 capsule (1 mg) by mouth 2 times daily     No current facility-administered medications for this visit.      Medications Discontinued During This Encounter   Medication Reason     allopurinol (ZYLOPRIM) 100 MG tablet      hydrochlorothiazide (MICROZIDE) 12.5 MG capsule      IBANdronate (BONIVA) 3 MG/3ML injection      metoprolol (LOPRESSOR) 25 MG tablet      Calcium Carb-Cholecalciferol (CALCIUM 1000 + D PO)      metFORMIN (GLUCOPHAGE-XR) 500 MG 24 hr tablet        Physical Exam   Vital signs were deferred for this telemedicine visit.    GENERAL APPEARANCE: alert and no distress  HENT: no obvious abnormalities on appearance  RESP: breathing appears unremarkable with normal rate, no audible wheezing or cough and no apparent shortness of breath with conversation  MS: extremities normal - no gross deformities noted  SKIN: no apparent rash and normal skin tone  NEURO: speech is clear with no obvious neurological deficits  PSYCH: mentation appears normal and affect normal    Data     Renal Latest Ref Rng & Units 11/14/2020 8/4/2020 1/27/2020   Na 133 - 144 mmol/L 137 138 134   Na (external) 135 - 145 mmol/L - - -   K 3.4 - 5.3 mmol/L 4.8 4.9 5.0   K (external) 3.5 - 5.0 mmol/L - - -   Cl 94 - 109 mmol/L 107 109 106   Cl (external) 98 - 110 mmol/L - - -   CO2 20 - 32 mmol/L 25 25 27   CO2 (external) 21 - 31 mmol/L - - -   BUN 7 - 30 mg/dL 24 28 19   BUN (external) 8 - 25 mg/dL - - -   Cr 0.52 - 1.04  mg/dL 1.13(H) 1.12(H) 1.16(H)   Cr (external) 0.57 - 1.11 mg/dL - - -   Glucose 70 - 99 mg/dL 221(H) 154(H) 233(H)   Glucose (external) 65 - 100 mg/dL - - -   Ca  8.5 - 10.1 mg/dL 9.0 9.4 9.8   Ca (external) 8.5 - 10.5 mg/dL - - -   Mg 1.6 - 2.3 mg/dL - - -   Mg (external) 1.6 - 2.6 mg/dL - - -     Bone Health Latest Ref Rng & Units 11/5/2010 2/26/2010 12/5/2009   Phos 2.5 - 4.5 mg/dL 2.9 3.0 3.2   PTHi 12 - 72 pg/mL - - -     Heme Latest Ref Rng & Units 11/14/2020 8/4/2020 1/27/2020   WBC 4.0 - 11.0 10e9/L 6.2 7.2 6.8   WBC (external) 4.5 - 11.0 thou/cu mm - - -   Hgb 11.7 - 15.7 g/dL 14.6 13.7 14.7   Hgb (external) 12.0 - 16.0 g/dL - - -   Plt 150 - 450 10e9/L 192 235 215   Plt (external) 140 - 440 thou/cu mm - - -   ABSOLUTE NEUTROPHIL 1.6 - 8.3 10e9/L - - -   ABSOLUTE NEUTROPHILS (EXTERNAL) 1.7 - 7.0 thou/cu mm - - -   ABSOLUTE LYMPHOCYTES 0.8 - 5.3 10e9/L - - -   ABSOLUTE LYMPHOCYTES (EXTERNAL) 0.9 - 2.9 thou/cu mm - - -   ABSOLUTE MONOCYTES 0.0 - 1.3 10e9/L - - -   ABSOLUTE MONOCYTES (EXTERNAL) <0.9 thou/cu mm - - -   ABSOLUTE EOSINOPHILS 0.0 - 0.7 10e9/L - - -   ABSOLUTE EOSINOPHILS (EXTERNAL) <0.5 thou/cu mm - - -   ABSOLUTE BASOPHILS 0.0 - 0.2 10e9/L - - -   ABSOLUTE BASOPHILS (EXTERNAL) <0.3 thou/cu mm - - -     Liver Latest Ref Rng & Units 12/24/2014 11/5/2010 2/26/2010   AP 40 - 150 U/L - - -   AP (external) 50 - 136 U/L 130 - -   TBili 0.2 - 1.3 mg/dL - - -   TBili (external) 0.2 - 1.2 mg/dL 0.7 - -   ALT 0 - 50 U/L - - -   ALT (external) 8 - 45 U/L 51(H) - -   AST 0 - 45 U/L - - -   AST (external) 2 - 40 U/L 27 - -   Tot Protein 6.8 - 8.8 g/dL - - -   Tot Protein (external) 6.0 - 8.0 g/dL 6.5 - -   Albumin 3.3 - 4.9 g/dL - 3.8 3.6   Albumin (external) 3.2 - 4.6 g/dL 3.8 - -     Pancreas Latest Ref Rng & Units 7/16/2019 12/24/2014 8/29/2007   A1C 0 - 5.6 % 8.6(H) - -   A1C (external) <=6.4 - 7.1(H) -   Amylase 30 - 110 U/L - - 57   Lipase 20 - 250 U/L - - 54     Iron studies Latest Ref Rng & Units  10/8/2007 8/28/2007   Iron 35 - 180 ug/dL 155 39   Ferritin 10 - 300 ng/mL 662(H) 287     [facility] Txp Virology Latest Ref Rng & Units 8/18/2009 7/10/2009 6/13/2008   CMV IgG EU/mL - - -   CVM DNA Quant - - - -   CMV Quant <100 Copies/mL - - -   CMV QT Log <2.0 Log copies/mL - - -   BK Spec - Plasma, EDTA anticoagulant Plasma, EDTA anticoagulant Plasma, EDTA anticoagulant   BK Res <1000 copies/mL <1000 <1000 <1000   BK Log <3.0 Log copies/mL <3.0 <3.0 <3.0   EBV IgG - - - -   Hep B Core NEG - - -   Hep B Surf 0.0 - 4.9 mIU/mL - - -   HIV 1&2 NEG - - -        Recent Labs   Lab Test 01/27/20  0846 08/04/20  0754 11/14/20  1022   DOSTAC 1/26/2020 2245 2005 08/03/2020 11/13/20 2300   TACROL 5.7 4.8* 3.3*

## 2020-12-07 NOTE — LETTER
Date:December 9, 2020      Patient was self referred, no letter generated. Do not send.        [facility] Information

## 2020-12-08 PROBLEM — D84.9 IMMUNOSUPPRESSION (H): Status: ACTIVE | Noted: 2020-12-08

## 2020-12-08 PROBLEM — K21.9 GERD (GASTROESOPHAGEAL REFLUX DISEASE): Status: ACTIVE | Noted: 2020-12-08

## 2020-12-08 PROBLEM — E55.9 VITAMIN D DEFICIENCY: Status: ACTIVE | Noted: 2020-12-08

## 2020-12-10 DIAGNOSIS — Z94.0 KIDNEY TRANSPLANTED: Primary | ICD-10-CM

## 2020-12-18 ENCOUNTER — TELEPHONE (OUTPATIENT)
Dept: TRANSPLANT | Facility: CLINIC | Age: 65
End: 2020-12-18

## 2020-12-18 NOTE — TELEPHONE ENCOUNTER
Call returned to pt. Let her know that from a transplant perspective ok to get the vaccine when it is available to her. Pt v/u.

## 2020-12-18 NOTE — TELEPHONE ENCOUNTER
Patient Call: Voicemail  Date/Time: 12/18/20 124pm  Reason for call: Patient left a voicemail requesting a call back, has questions re: COVID vaccine.

## 2020-12-23 ENCOUNTER — TELEPHONE (OUTPATIENT)
Dept: EDUCATION SERVICES | Facility: CLINIC | Age: 65
End: 2020-12-23

## 2020-12-23 NOTE — TELEPHONE ENCOUNTER
Eliana left a message that she needs to make a follow-up appt with Melanie in Diabetes Education. It looks like Melanie is with [facility] Diabetes education team, so I left a VM for Eliana letting her know this and giving her the [facility] phone number.    Trini Hopkins RN, [facility]

## 2021-01-15 ENCOUNTER — HEALTH MAINTENANCE LETTER (OUTPATIENT)
Age: 66
End: 2021-01-15

## 2021-01-25 DIAGNOSIS — Z94.0 KIDNEY TRANSPLANTED: ICD-10-CM

## 2021-01-25 DIAGNOSIS — Z48.298 AFTERCARE FOLLOWING ORGAN TRANSPLANT: ICD-10-CM

## 2021-01-25 LAB
ANION GAP SERPL CALCULATED.3IONS-SCNC: 6 MMOL/L (ref 3–14)
BUN SERPL-MCNC: 24 MG/DL (ref 7–30)
CALCIUM SERPL-MCNC: 10.1 MG/DL (ref 8.5–10.1)
CHLORIDE SERPL-SCNC: 105 MMOL/L (ref 94–109)
CO2 SERPL-SCNC: 26 MMOL/L (ref 20–32)
CREAT SERPL-MCNC: 1.2 MG/DL (ref 0.52–1.04)
ERYTHROCYTE [DISTWIDTH] IN BLOOD BY AUTOMATED COUNT: 11.8 % (ref 10–15)
GFR SERPL CREATININE-BSD FRML MDRD: 47 ML/MIN/{1.73_M2}
GLUCOSE SERPL-MCNC: 201 MG/DL (ref 70–99)
HCT VFR BLD AUTO: 45.2 % (ref 35–47)
HGB BLD-MCNC: 15 G/DL (ref 11.7–15.7)
MCH RBC QN AUTO: 33.7 PG (ref 26.5–33)
MCHC RBC AUTO-ENTMCNC: 33.2 G/DL (ref 31.5–36.5)
MCV RBC AUTO: 102 FL (ref 78–100)
PLATELET # BLD AUTO: 214 10E9/L (ref 150–450)
POTASSIUM SERPL-SCNC: 5 MMOL/L (ref 3.4–5.3)
RBC # BLD AUTO: 4.45 10E12/L (ref 3.8–5.2)
SODIUM SERPL-SCNC: 137 MMOL/L (ref 133–144)
TACROLIMUS BLD-MCNC: <3 UG/L (ref 5–15)
TME LAST DOSE: ABNORMAL H
VIT B12 SERPL-MCNC: 236 PG/ML (ref 193–986)
WBC # BLD AUTO: 6.5 10E9/L (ref 4–11)

## 2021-01-25 PROCEDURE — 80048 BASIC METABOLIC PNL TOTAL CA: CPT | Performed by: INTERNAL MEDICINE

## 2021-01-25 PROCEDURE — 80197 ASSAY OF TACROLIMUS: CPT | Performed by: INTERNAL MEDICINE

## 2021-01-25 PROCEDURE — 82306 VITAMIN D 25 HYDROXY: CPT | Performed by: INTERNAL MEDICINE

## 2021-01-25 PROCEDURE — 82607 VITAMIN B-12: CPT | Performed by: INTERNAL MEDICINE

## 2021-01-25 PROCEDURE — 85027 COMPLETE CBC AUTOMATED: CPT | Performed by: INTERNAL MEDICINE

## 2021-01-25 PROCEDURE — 36415 COLL VENOUS BLD VENIPUNCTURE: CPT | Performed by: INTERNAL MEDICINE

## 2021-01-26 ENCOUNTER — TELEPHONE (OUTPATIENT)
Dept: TRANSPLANT | Facility: CLINIC | Age: 66
End: 2021-01-26

## 2021-01-26 DIAGNOSIS — Z94.0 KIDNEY TRANSPLANTED: Primary | ICD-10-CM

## 2021-01-26 DIAGNOSIS — Z94.0 KIDNEY REPLACED BY TRANSPLANT: ICD-10-CM

## 2021-01-26 DIAGNOSIS — Z48.298 AFTERCARE FOLLOWING ORGAN TRANSPLANT: ICD-10-CM

## 2021-01-26 LAB — DEPRECATED CALCIDIOL+CALCIFEROL SERPL-MC: 7 UG/L (ref 20–75)

## 2021-01-26 NOTE — TELEPHONE ENCOUNTER
Patient called regarding her tacrolimus. Patient stated she has some medication dose changes for her diabetes.

## 2021-01-26 NOTE — TELEPHONE ENCOUNTER
Call returned to patient. Patient confirm current dose and 11 hour trough level. Patient expressed that he diabetic medication maybe affecting her tacrolimus. State that she read diabetic medications increases GFR and creatinine. Writer informed patient that her creatinine is within baseline and and if it was elevated that tacrolimus would likely be higher. Patient note that also she's been skipping doses d\t an upset stomach and stress r\t her  being ill. State that she skipped this morning dose altogether. Writer advised her to take dose now and continue with evening dose on schedule. Patient v\u that missing doses will put her at risk for rejection.  Will continue current dose and repeat level in one week. Order sent

## 2021-01-26 NOTE — TELEPHONE ENCOUNTER
ISSUE:   Vitamin D level 7    PLAN:  Henrique Morrow MD Ylitalo, Callie Nolen, RN             Vitamin D deficiency, but with high serum calcium level, would hold off on starting treatment and recommend checking PTH.      OUTCOME:   Spoke with pt. Pt notes multiple social stressors in her life right now. Discussed low tacrolimus levels. Pt reports missing 1-2 AM doses/week for the last couple months. Level also below goal in Nov, instructed pt to increase tacrolimus dose to 2 mg BID (from 1.5) and work on a plan to help w/ remembering to take meds.   States she just spoke with endocrine nurse. DM meds being changed again, pt was instructed to stop jardiance, take glimeperide. Also will be started on a basal insulin. Recommended pt continue to follow guidance from endocrine. Agree if endo wants to review/coordinate plan they can do a provider-provider call or call tx office.   Rx and orders updated

## 2021-01-27 RX ORDER — TACROLIMUS 0.5 MG/1
CAPSULE, GELATIN COATED ORAL
Qty: 60 CAPSULE | Refills: 11
Start: 2021-01-27 | End: 2021-02-03

## 2021-01-27 RX ORDER — TACROLIMUS 1 MG/1
2 CAPSULE, GELATIN COATED ORAL 2 TIMES DAILY
Qty: 120 CAPSULE | Refills: 11 | Status: SHIPPED | OUTPATIENT
Start: 2021-01-27 | End: 2021-02-03

## 2021-02-02 ENCOUNTER — TELEPHONE (OUTPATIENT)
Dept: TRANSPLANT | Facility: CLINIC | Age: 66
End: 2021-02-02

## 2021-02-02 DIAGNOSIS — Z94.0 KIDNEY REPLACED BY TRANSPLANT: ICD-10-CM

## 2021-02-02 DIAGNOSIS — Z94.0 KIDNEY TRANSPLANTED: ICD-10-CM

## 2021-02-02 NOTE — LETTER
February 3, 2021      From: [facility]  Solid Organ Transplant Services      To Whom It May Concern:     Eliana Jane underwent a kidney transplant and is required to take immunosuppressive medication.  As such she currently fits into a  high risk  category for COVID-19.     For patients that work outside the home, we recommend that all reasonable accommodations be made to allow working remotely or modifying work responsibilities to protect your health.     If you have any questions or concerns about the above request, please contact the Solid Organ Transplant Office at [phone].      Thank you for your partnership.      Sincerely,   [facility]   Solid Organ Transplant Services

## 2021-02-02 NOTE — TELEPHONE ENCOUNTER
Patient Call: General  Route to LPN    Reason for call:   1. 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 She is having problems with the increased dose of tacro  wonders if she can take the higher dose in the evening   3. Seen her Endocrinologist today  He wants to start her on Basil insulin   Call back needed? Yes    Return Call Needed  Same as documented in contacts section  When to return call?: Greater than one day: Route standard priority

## 2021-02-02 NOTE — TELEPHONE ENCOUNTER
Spoke w/ patient.   1. Let pt know [facility]  2. Pt reports nausea in the morning (this is not new). Discussed that this is less likely r/t to tac dose, mycophenolate more likely cause. Pt may want to further look into cause of nausea in the mornings. For now, ok to reduce Tac to 1.5 mg every morning and 2 mg every evening. Pt will repeat level on Thursday.    3. Agree w/ following endocrinology recommendations and start basal insulin

## 2021-02-03 RX ORDER — TACROLIMUS 1 MG/1
CAPSULE, GELATIN COATED ORAL
Qty: 90 CAPSULE | Refills: 11 | Status: SHIPPED | OUTPATIENT
Start: 2021-02-03 | End: 2021-02-11

## 2021-02-03 RX ORDER — TACROLIMUS 0.5 MG/1
0.5 CAPSULE, GELATIN COATED ORAL EVERY MORNING
Qty: 30 CAPSULE | Refills: 11 | Status: SHIPPED | OUTPATIENT
Start: 2021-02-03 | End: 2021-02-11

## 2021-02-09 DIAGNOSIS — Z48.298 AFTERCARE FOLLOWING ORGAN TRANSPLANT: ICD-10-CM

## 2021-02-09 DIAGNOSIS — Z94.0 KIDNEY TRANSPLANTED: ICD-10-CM

## 2021-02-09 LAB
ANION GAP SERPL CALCULATED.3IONS-SCNC: 3 MMOL/L (ref 3–14)
BUN SERPL-MCNC: 22 MG/DL (ref 7–30)
CALCIUM SERPL-MCNC: 9.6 MG/DL (ref 8.5–10.1)
CHLORIDE SERPL-SCNC: 107 MMOL/L (ref 94–109)
CO2 SERPL-SCNC: 28 MMOL/L (ref 20–32)
CREAT SERPL-MCNC: 1.27 MG/DL (ref 0.52–1.04)
GFR SERPL CREATININE-BSD FRML MDRD: 44 ML/MIN/{1.73_M2}
GLUCOSE SERPL-MCNC: 180 MG/DL (ref 70–99)
POTASSIUM SERPL-SCNC: 4.7 MMOL/L (ref 3.4–5.3)
PROT UR-MCNC: 0.1 G/L
PROT/CREAT 24H UR: 0.09 G/G CR (ref 0–0.2)
PTH-INTACT SERPL-MCNC: 111 PG/ML (ref 18–80)
SODIUM SERPL-SCNC: 138 MMOL/L (ref 133–144)
TACROLIMUS BLD-MCNC: 10.7 UG/L (ref 5–15)
TME LAST DOSE: 2230 H

## 2021-02-09 PROCEDURE — 84156 ASSAY OF PROTEIN URINE: CPT | Performed by: INTERNAL MEDICINE

## 2021-02-09 PROCEDURE — 80197 ASSAY OF TACROLIMUS: CPT | Performed by: INTERNAL MEDICINE

## 2021-02-09 PROCEDURE — 36415 COLL VENOUS BLD VENIPUNCTURE: CPT | Performed by: INTERNAL MEDICINE

## 2021-02-09 PROCEDURE — 80048 BASIC METABOLIC PNL TOTAL CA: CPT | Performed by: INTERNAL MEDICINE

## 2021-02-09 PROCEDURE — 83970 ASSAY OF PARATHORMONE: CPT | Performed by: INTERNAL MEDICINE

## 2021-02-10 DIAGNOSIS — Z94.0 KIDNEY TRANSPLANTED: ICD-10-CM

## 2021-02-10 DIAGNOSIS — Z94.0 KIDNEY REPLACED BY TRANSPLANT: ICD-10-CM

## 2021-02-10 DIAGNOSIS — Z48.298 AFTERCARE FOLLOWING ORGAN TRANSPLANT: Primary | ICD-10-CM

## 2021-02-10 NOTE — TELEPHONE ENCOUNTER
ISSUE:   Tacrolimus IR level 10.7 on 2/9, goal 4-6, dose 2 mg BID.  Cr 1.27    PLAN:   Please call patient and confirm this was an accurate 12-hour trough. Verify Tacrolimus IR dose 2 mg BID. Confirm no new medications or illness. Confirm no missed doses. If accurate trough and accurate dose, decrease Tacrolimus IR dose to 1.5 mg BID and repeat labs (BMP and tacrolimus level) in 1 week    Check if pt is dehydrated? Any recent illness? New or missed meds?   Encourage adequate hydration and repeat labs next week.     Callie Vasquez, RN

## 2021-02-11 RX ORDER — TACROLIMUS 0.5 MG/1
0.5 CAPSULE, GELATIN COATED ORAL 2 TIMES DAILY
Qty: 60 CAPSULE | Refills: 11 | Status: SHIPPED | OUTPATIENT
Start: 2021-02-11 | End: 2022-02-11

## 2021-02-11 RX ORDER — TACROLIMUS 1 MG/1
1 CAPSULE, GELATIN COATED ORAL 2 TIMES DAILY
Qty: 60 CAPSULE | Refills: 11 | Status: SHIPPED | OUTPATIENT
Start: 2021-02-11 | End: 2022-02-11

## 2021-02-11 NOTE — TELEPHONE ENCOUNTER
OUTCOME:   Spoke with patient, they confirm accurate trough level and current dose 2 mg BID. Patient confirmed dose change to 1.5 mg BID and to repeat labs in 1 weeks. Orders sent to preferred pharmacy for dose change and lab for repeat labs. Patient voiced understanding of plan.     Denies any recent illness, dehydration or medication changes. Patient will improve hydration.

## 2021-02-20 DIAGNOSIS — Z48.298 AFTERCARE FOLLOWING ORGAN TRANSPLANT: ICD-10-CM

## 2021-02-20 PROCEDURE — 36415 COLL VENOUS BLD VENIPUNCTURE: CPT | Performed by: INTERNAL MEDICINE

## 2021-02-20 PROCEDURE — 80048 BASIC METABOLIC PNL TOTAL CA: CPT | Performed by: INTERNAL MEDICINE

## 2021-02-20 PROCEDURE — 80197 ASSAY OF TACROLIMUS: CPT | Performed by: INTERNAL MEDICINE

## 2021-02-21 LAB
TACROLIMUS BLD-MCNC: 4.1 UG/L (ref 5–15)
TME LAST DOSE: 2300 H

## 2021-02-22 LAB
ANION GAP SERPL CALCULATED.3IONS-SCNC: 3 MMOL/L (ref 3–14)
BUN SERPL-MCNC: 23 MG/DL (ref 7–30)
CALCIUM SERPL-MCNC: 9.7 MG/DL (ref 8.5–10.1)
CHLORIDE SERPL-SCNC: 107 MMOL/L (ref 94–109)
CO2 SERPL-SCNC: 29 MMOL/L (ref 20–32)
CREAT SERPL-MCNC: 1.23 MG/DL (ref 0.52–1.04)
GFR SERPL CREATININE-BSD FRML MDRD: 46 ML/MIN/{1.73_M2}
GLUCOSE SERPL-MCNC: 188 MG/DL (ref 70–99)
POTASSIUM SERPL-SCNC: 4.8 MMOL/L (ref 3.4–5.3)
SODIUM SERPL-SCNC: 139 MMOL/L (ref 133–144)

## 2021-03-17 ENCOUNTER — TELEPHONE (OUTPATIENT)
Dept: TRANSPLANT | Facility: CLINIC | Age: 66
End: 2021-03-17

## 2021-03-17 NOTE — TELEPHONE ENCOUNTER
Call returned to pt. ELIANA letting her know she does not need abx for dental procedure from transplant perspective. She should discuss this with her dentist if they feel it is necessary. Requested return call with questions.

## 2021-06-05 ENCOUNTER — HEALTH MAINTENANCE LETTER (OUTPATIENT)
Age: 66
End: 2021-06-05

## 2021-07-01 DIAGNOSIS — Z48.298 AFTERCARE FOLLOWING ORGAN TRANSPLANT: ICD-10-CM

## 2021-07-01 LAB
ANION GAP SERPL CALCULATED.3IONS-SCNC: 2 MMOL/L (ref 3–14)
BUN SERPL-MCNC: 19 MG/DL (ref 7–30)
CALCIUM SERPL-MCNC: 9.9 MG/DL (ref 8.5–10.1)
CHLORIDE SERPL-SCNC: 108 MMOL/L (ref 94–109)
CO2 SERPL-SCNC: 28 MMOL/L (ref 20–32)
CREAT SERPL-MCNC: 1.13 MG/DL (ref 0.52–1.04)
ERYTHROCYTE [DISTWIDTH] IN BLOOD BY AUTOMATED COUNT: 12 % (ref 10–15)
GFR SERPL CREATININE-BSD FRML MDRD: 50 ML/MIN/{1.73_M2}
GLUCOSE SERPL-MCNC: 176 MG/DL (ref 70–99)
HCT VFR BLD AUTO: 43.3 % (ref 35–47)
HGB BLD-MCNC: 14.3 G/DL (ref 11.7–15.7)
MCH RBC QN AUTO: 32.9 PG (ref 26.5–33)
MCHC RBC AUTO-ENTMCNC: 33 G/DL (ref 31.5–36.5)
MCV RBC AUTO: 100 FL (ref 78–100)
PLATELET # BLD AUTO: 218 10E9/L (ref 150–450)
POTASSIUM SERPL-SCNC: 5 MMOL/L (ref 3.4–5.3)
RBC # BLD AUTO: 4.34 10E12/L (ref 3.8–5.2)
SODIUM SERPL-SCNC: 138 MMOL/L (ref 133–144)
TACROLIMUS BLD-MCNC: 6.8 UG/L (ref 5–15)
TME LAST DOSE: NORMAL H
WBC # BLD AUTO: 6.9 10E9/L (ref 4–11)

## 2021-07-01 PROCEDURE — 80197 ASSAY OF TACROLIMUS: CPT | Performed by: INTERNAL MEDICINE

## 2021-07-01 PROCEDURE — 80048 BASIC METABOLIC PNL TOTAL CA: CPT | Performed by: INTERNAL MEDICINE

## 2021-07-01 PROCEDURE — 36415 COLL VENOUS BLD VENIPUNCTURE: CPT | Performed by: INTERNAL MEDICINE

## 2021-07-01 PROCEDURE — 85027 COMPLETE CBC AUTOMATED: CPT | Performed by: INTERNAL MEDICINE

## 2021-07-02 ENCOUNTER — TELEPHONE (OUTPATIENT)
Dept: TRANSPLANT | Facility: CLINIC | Age: 66
End: 2021-07-02

## 2021-07-02 DIAGNOSIS — Z79.899 ENCOUNTER FOR LONG-TERM CURRENT USE OF MEDICATION: ICD-10-CM

## 2021-07-02 DIAGNOSIS — Z48.298 AFTERCARE FOLLOWING ORGAN TRANSPLANT: Primary | ICD-10-CM

## 2021-07-02 DIAGNOSIS — Z48.298 AFTERCARE FOLLOWING ORGAN TRANSPLANT: ICD-10-CM

## 2021-07-02 DIAGNOSIS — Z94.0 KIDNEY TRANSPLANTED: Primary | ICD-10-CM

## 2021-07-02 DIAGNOSIS — Z94.0 KIDNEY REPLACED BY TRANSPLANT: ICD-10-CM

## 2021-07-02 NOTE — TELEPHONE ENCOUNTER
Call placed to patient. She confirms inaccurate trough level. Denies any recent illness, diarrhea or medication changes. Patient v\u to repeat level in July

## 2021-07-02 NOTE — TELEPHONE ENCOUNTER
Issue:  Tac above goal, variable levels/trough times this year.    Plan/LPN task:  Please call Eliana Jane, confirm trough timing. If inaccurate, would recommend repeat BMP and 12 hour tac in July.

## 2021-07-13 DIAGNOSIS — E78.5 HYPERLIPIDEMIA: ICD-10-CM

## 2021-07-13 RX ORDER — SIMVASTATIN 20 MG
TABLET ORAL
Qty: 90 TABLET | Refills: 3 | Status: SHIPPED | OUTPATIENT
Start: 2021-07-13 | End: 2022-11-10

## 2021-09-20 ENCOUNTER — LAB (OUTPATIENT)
Dept: LAB | Facility: CLINIC | Age: 66
End: 2021-09-20
Payer: COMMERCIAL

## 2021-09-20 DIAGNOSIS — Z94.0 KIDNEY REPLACED BY TRANSPLANT: ICD-10-CM

## 2021-09-20 DIAGNOSIS — Z79.899 ENCOUNTER FOR LONG-TERM CURRENT USE OF MEDICATION: ICD-10-CM

## 2021-09-20 DIAGNOSIS — Z94.0 KIDNEY TRANSPLANTED: ICD-10-CM

## 2021-09-20 DIAGNOSIS — Z48.298 AFTERCARE FOLLOWING ORGAN TRANSPLANT: ICD-10-CM

## 2021-09-20 LAB
ALBUMIN SERPL-MCNC: 3.6 G/DL (ref 3.4–5)
ANION GAP SERPL CALCULATED.3IONS-SCNC: 8 MMOL/L (ref 3–14)
BUN SERPL-MCNC: 18 MG/DL (ref 7–30)
CALCIUM SERPL-MCNC: 9.2 MG/DL (ref 8.5–10.1)
CHLORIDE BLD-SCNC: 108 MMOL/L (ref 94–109)
CO2 SERPL-SCNC: 22 MMOL/L (ref 20–32)
CREAT SERPL-MCNC: 1.07 MG/DL (ref 0.52–1.04)
CREAT UR-MCNC: 29 MG/DL
ERYTHROCYTE [DISTWIDTH] IN BLOOD BY AUTOMATED COUNT: 11.7 % (ref 10–15)
GFR SERPL CREATININE-BSD FRML MDRD: 54 ML/MIN/1.73M2
GLUCOSE BLD-MCNC: 166 MG/DL (ref 70–99)
HCT VFR BLD AUTO: 39.4 % (ref 35–47)
HGB BLD-MCNC: 13.4 G/DL (ref 11.7–15.7)
MCH RBC QN AUTO: 33.8 PG (ref 26.5–33)
MCHC RBC AUTO-ENTMCNC: 34 G/DL (ref 31.5–36.5)
MCV RBC AUTO: 100 FL (ref 78–100)
PLATELET # BLD AUTO: 212 10E3/UL (ref 150–450)
POTASSIUM BLD-SCNC: 5 MMOL/L (ref 3.4–5.3)
PROT UR-MCNC: <0.05 G/L
PROT/CREAT 24H UR: NORMAL MG/G{CREAT}
RBC # BLD AUTO: 3.96 10E6/UL (ref 3.8–5.2)
SODIUM SERPL-SCNC: 138 MMOL/L (ref 133–144)
TACROLIMUS BLD-MCNC: 5.1 UG/L (ref 5–15)
TME LAST DOSE: NORMAL H
TME LAST DOSE: NORMAL H
TOTAL PROTEIN SERUM FOR ELP: 6.6 G/DL (ref 6.8–8.8)
WBC # BLD AUTO: 6 10E3/UL (ref 4–11)

## 2021-09-20 PROCEDURE — 84165 PROTEIN E-PHORESIS SERUM: CPT | Performed by: STUDENT IN AN ORGANIZED HEALTH CARE EDUCATION/TRAINING PROGRAM

## 2021-09-20 PROCEDURE — 36415 COLL VENOUS BLD VENIPUNCTURE: CPT

## 2021-09-20 PROCEDURE — 83883 ASSAY NEPHELOMETRY NOT SPEC: CPT

## 2021-09-20 PROCEDURE — 85027 COMPLETE CBC AUTOMATED: CPT

## 2021-09-20 PROCEDURE — 80048 BASIC METABOLIC PNL TOTAL CA: CPT

## 2021-09-20 PROCEDURE — 84156 ASSAY OF PROTEIN URINE: CPT

## 2021-09-20 PROCEDURE — 82040 ASSAY OF SERUM ALBUMIN: CPT

## 2021-09-20 PROCEDURE — 84155 ASSAY OF PROTEIN SERUM: CPT

## 2021-09-20 PROCEDURE — 80197 ASSAY OF TACROLIMUS: CPT

## 2021-09-21 LAB
KAPPA LC FREE SER-MCNC: 2.43 MG/DL (ref 0.33–1.94)
KAPPA LC FREE/LAMBDA FREE SER NEPH: 1.83 {RATIO} (ref 0.26–1.65)
LAMBDA LC FREE SERPL-MCNC: 1.33 MG/DL (ref 0.57–2.63)

## 2021-09-22 ENCOUNTER — TELEPHONE (OUTPATIENT)
Dept: TRANSPLANT | Facility: CLINIC | Age: 66
End: 2021-09-22

## 2021-09-22 DIAGNOSIS — D84.9 IMMUNOSUPPRESSED STATUS (H): ICD-10-CM

## 2021-09-22 DIAGNOSIS — Z94.0 KIDNEY TRANSPLANTED: Primary | ICD-10-CM

## 2021-09-22 DIAGNOSIS — Z48.298 AFTERCARE FOLLOWING ORGAN TRANSPLANT: ICD-10-CM

## 2021-09-22 LAB
ALBUMIN SERPL ELPH-MCNC: 3.9 G/DL (ref 3.7–5.1)
ALPHA1 GLOB SERPL ELPH-MCNC: 0.3 G/DL (ref 0.2–0.4)
ALPHA2 GLOB SERPL ELPH-MCNC: 0.8 G/DL (ref 0.5–0.9)
B-GLOBULIN SERPL ELPH-MCNC: 0.7 G/DL (ref 0.6–1)
GAMMA GLOB SERPL ELPH-MCNC: 0.9 G/DL (ref 0.7–1.6)
M PROTEIN SERPL ELPH-MCNC: 0.5 G/DL
PROT PATTERN SERPL ELPH-IMP: ABNORMAL

## 2021-09-22 NOTE — TELEPHONE ENCOUNTER
Patient Call: General  Route to LPN    Reason for call: Patient would like to discuss recent lab results. If able to call within 15 minutes please do so otherwise patient free after 3:30    Call back needed? Yes    Return Call Needed  Same as documented in contacts section  When to return call?: Same day: Route High Priority

## 2021-09-22 NOTE — TELEPHONE ENCOUNTER
Henrique Morrow MD Ylitalo, Kim Michelle, RN  Mildly abnormal and would refer patient to Hematology, as well as checking serum and urine immunofixation      OUTCOME:   Spoke w/ pt regarding above plan. Pt v/u.   Order placed for referral and labs.

## 2021-09-23 ENCOUNTER — PATIENT OUTREACH (OUTPATIENT)
Dept: ONCOLOGY | Facility: CLINIC | Age: 66
End: 2021-09-23

## 2021-09-23 NOTE — PROGRESS NOTES
New Patient Oncology Nurse Navigator Note     Referral Date: September 22, 2021     Referring provider:  Dr Cristhian DAVE [facility] OTHER [facility]     Evaluation for :   Z94.0 (ICD-10-CM) - Kidney transplanted   Z48.298 (ICD-10-CM) - Aftercare following organ transplant   D84.9 (ICD-10-CM) - Immunosuppressed status (H)       Abnormal SPEP and light chains - BOOKMARKED    Referral updates and Plan:   September 23, 2021 OUTGOING CALL to pt: Introduced my role as nurse navigator with [facility] Hematology/Oncology dept and that we have recd the referral to hematology  Pt confirms she is aware of the referral and was driving, prefers a call tomorrow to schedule because she was driving, requesting [facility] location   currently hospitalized, has Parkinson's and dementia. 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  Explained to pt that she will receive a call from our scheduling intake team to set an appt at [facility] the week of 10/11 and provided our call-back number below if needed.    Luisa Street, RN, BSN, OCN  Hematology/Oncology Nurse Navigator   [facility]  [phone]       Future Appointments   Date Time Provider Department Center   10/15/2021  3:00 PM Dilan Lamb MD [facility]

## 2021-09-25 ENCOUNTER — HEALTH MAINTENANCE LETTER (OUTPATIENT)
Age: 66
End: 2021-09-25

## 2021-10-13 ENCOUNTER — LAB (OUTPATIENT)
Dept: LAB | Facility: CLINIC | Age: 66
End: 2021-10-13
Payer: COMMERCIAL

## 2021-10-13 DIAGNOSIS — Z94.0 KIDNEY TRANSPLANTED: ICD-10-CM

## 2021-10-13 DIAGNOSIS — D84.9 IMMUNOSUPPRESSED STATUS (H): ICD-10-CM

## 2021-10-13 DIAGNOSIS — Z48.298 AFTERCARE FOLLOWING ORGAN TRANSPLANT: ICD-10-CM

## 2021-10-13 PROCEDURE — 86334 IMMUNOFIX E-PHORESIS SERUM: CPT

## 2021-10-13 PROCEDURE — 86335 IMMUNFIX E-PHORSIS/URINE/CSF: CPT

## 2021-10-13 PROCEDURE — 36415 COLL VENOUS BLD VENIPUNCTURE: CPT

## 2021-10-14 LAB
PROT ELPH PNL UR ELPH: NORMAL
PROT PATTERN SERPL IFE-IMP: NORMAL

## 2021-10-15 ENCOUNTER — ONCOLOGY VISIT (OUTPATIENT)
Dept: ONCOLOGY | Facility: CLINIC | Age: 66
End: 2021-10-15
Attending: INTERNAL MEDICINE
Payer: COMMERCIAL

## 2021-10-15 VITALS
OXYGEN SATURATION: 96 % | SYSTOLIC BLOOD PRESSURE: 152 MMHG | RESPIRATION RATE: 16 BRPM | BODY MASS INDEX: 32.33 KG/M2 | HEART RATE: 63 BPM | DIASTOLIC BLOOD PRESSURE: 92 MMHG | TEMPERATURE: 98 F | WEIGHT: 194.3 LBS

## 2021-10-15 DIAGNOSIS — Z94.0 KIDNEY TRANSPLANTED: ICD-10-CM

## 2021-10-15 DIAGNOSIS — D47.2 MGUS (MONOCLONAL GAMMOPATHY OF UNKNOWN SIGNIFICANCE): Primary | ICD-10-CM

## 2021-10-15 DIAGNOSIS — D84.9 IMMUNOSUPPRESSED STATUS (H): ICD-10-CM

## 2021-10-15 DIAGNOSIS — Z48.298 AFTERCARE FOLLOWING ORGAN TRANSPLANT: ICD-10-CM

## 2021-10-15 PROCEDURE — G0463 HOSPITAL OUTPT CLINIC VISIT: HCPCS

## 2021-10-15 PROCEDURE — 99204 OFFICE O/P NEW MOD 45 MIN: CPT | Performed by: INTERNAL MEDICINE

## 2021-10-15 ASSESSMENT — PAIN SCALES - GENERAL: PAINLEVEL: NO PAIN (0)

## 2021-10-15 NOTE — PROGRESS NOTES
[facility] PHYSICIANS  HEMATOLOGY AND MEDICAL ONCOLOGY    CONSULTATION/NEW PATIENT VISIT    PATIENT NAME: Eliana Jane   MRN# [id number]     Date of Visit: Oct 15, 2021    Referring Provider: Henrique Morrow MD  [address] [place] [address] [address]  [place] [address] YOB: 1955     Reason for visit: new patient visit for monoclonal protein    CHIEF COMPLAINT   Oncology Clinic Visit (Pt is here for a New Eval for Myeloma)       HISTORY OF PRESENTING ILLNESS   67 yo woman who presents for evaluation of monoclonal protein detected on recent SPEP obtained because of a low anion gap. Patient is feeling well otherwise.  S/P renal transplant 15 years ago on tacrolimus       PAST MEDICAL HISTORY     Past Medical History:   Diagnosis Date     Diabetes (H)      Hyperlipidemia      Hypothyroidism      MGUS (monoclonal gammopathy of unknown significance)      Renal transplant recipient       Renal transplant 8/16/07 from sister  Esophgeal reflux  Osteopenia 3-12-13  Pulmonary embolus 2/2/2015, neg scans but considered high probability clinically so treated  HTN  Asthma     PAST SURGICAL HISTORY     Past Surgical History:   Procedure Laterality Date     KNEE SURGERY Right 1/23/2015     KNEE SURGERY       NEPHRECTOMY TRANSPLANTED ORGAN           CURRENT OUTPATIENT MEDICATIONS     Current Outpatient Medications   Medication Sig     Albuterol (VENTOLIN IN) Inhale 1 Inhaler into the lungs daily as needed.     CELLCEPT (BRAND) 250 MG capsule Take 2 capsules (500 mg) by mouth 2 times daily GENERIC     citalopram (CELEXA) 20 MG tablet Take 2 tablets (40 mg) by mouth daily     empagliflozin (JARDIANCE) 10 MG TABS tablet Take 1 tablet (10 mg) by mouth daily     fluticasone (VERAMYST) 27.5 MCG/SPRAY spray Spray 2 sprays into both nostrils daily     Fluticasone-Salmeterol (ADVAIR DISKUS IN) Inhale 2 Inhalers into the lungs daily as needed.     glimepiride (AMARYL) 1 MG tablet Take 3 tablets  (3 mg) by mouth every morning (before breakfast)     levothyroxine (SYNTHROID/LEVOTHROID) 50 MCG tablet Take 1 tablet (50 mcg) by mouth daily     mometasone (NASONEX) 50 MCG/ACT nasal spray Spray 2 sprays into both nostrils daily Reported on 4/7/2017     mometasone-formoterol (DULERA) 200-5 MCG/ACT oral inhaler Inhale 1 puff into the lungs 2 times daily Reported on 4/7/2017     omeprazole (PRILOSEC) 20 MG CR capsule Take 1 capsule (20 mg) by mouth daily     predniSONE (DELTASONE) 5 MG tablet Take 1 tablet (5 mg) by mouth daily     PROGRAF (BRAND) 0.5 MG capsule Take 1 capsule (0.5 mg) by mouth 2 times daily Total dose = 1.5 mg twice a day     PROGRAF (BRAND) 1 MG capsule Take 1 capsule (1 mg) by mouth 2 times daily Total dose = 1.5 mg twice a day     simvastatin (ZOCOR) 20 MG tablet TAKE ONE TABLET BY MOUTH EVERY EVENING     No current facility-administered medications for this visit.        ALLERGIES     Allergies   Allergen Reactions     Dust Mites Unknown     Trees      asthma     .     SOCIAL HISTORY     Social History     Socioeconomic History     Marital status:      Spouse name: Not on file     Number of children: Not on file     Years of education: Not on file     Highest education level: Not on file   Occupational History     Not on file   Tobacco Use     Smoking status: Never Smoker     Smokeless tobacco: Never Used   Substance and Sexual Activity     Alcohol use: Not on file     Drug use: Not on file     Sexual activity: Not on file   Other Topics Concern     Parent/sibling w/ CABG, MI or angioplasty before 65F 55M? Not Asked   Social History Narrative     Not on file     Social Determinants of Health     Financial Resource Strain:      Difficulty of Paying Living Expenses:    Food Insecurity:      Worried About Running Out of Food in the Last Year:      Ran Out of Food in the Last Year:    Transportation Needs:      Lack of Transportation (Medical):      Lack of Transportation (Non-Medical):     Physical Activity:      Days of Exercise per Week:      Minutes of Exercise per Session:    Stress:      Feeling of Stress :    Social Connections:      Frequency of Communication with Friends and Family:      Frequency of Social Gatherings with Friends and Family:      Attends Voodoo Services:      Active Member of Clubs or Organizations:      Attends Club or Organization Meetings:      Marital Status:    Intimate Partner Violence:      Fear of Current or Ex-Partner:      Emotionally Abused:      Physically Abused:      Sexually Abused:    Patient cares for  with Parkinson's and dementia; 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       FAMILY HISTORY     Family History   Problem Relation Age of Onset     Lung Cancer Father      Thyroid Disease Father      Hypothyroidism Mother      Heart Disease Mother      Heart Disease Father      Prostate Cancer Father      Cerebrovascular Disease Paternal Grandfather      Breast Cancer Maternal Grandmother      Hyperlipidemia Mother      Hyperlipidemia Father           REVIEW OF SYSTEMS   Review of Systems   Constitutional: Negative for chills, diaphoresis, fever, malaise/fatigue and weight loss.   HENT: Negative for congestion, ear discharge, ear pain, hearing loss, nosebleeds, sinus pain, sore throat and tinnitus.    Eyes: Negative for blurred vision, double vision, photophobia, pain, discharge and redness.   Respiratory: Negative for cough, hemoptysis, sputum production, shortness of breath, wheezing and stridor.    Cardiovascular: Negative for chest pain, palpitations, orthopnea, claudication, leg swelling and PND.   Gastrointestinal: Negative for abdominal pain, blood in stool, constipation, diarrhea, heartburn, melena, nausea and vomiting.   Genitourinary: Negative for dysuria, flank pain, frequency, hematuria and urgency.   Musculoskeletal:  Negative for back pain, falls, joint pain, myalgias and neck pain.   Skin: Negative for itching and rash.   Neurological: Negative for dizziness, tingling, tremors, sensory change, speech change, focal weakness, seizures, loss of consciousness, weakness and headaches.   Endo/Heme/Allergies: Negative for environmental allergies and polydipsia. Does not bruise/bleed easily.   Psychiatric/Behavioral: Negative for depression, hallucinations, memory loss, substance abuse and suicidal ideas. The patient is not nervous/anxious and does not have insomnia.           PHYSICAL EXAM   B/P: 152/92, T: 98, P: 63, R: 16  Wt Readings from Last 3 Encounters:   10/15/21 88.1 kg (194 lb 4.8 oz)   05/23/19 85.8 kg (189 lb 3.2 oz)   04/07/17 91 kg (200 lb 11.2 oz)     General appearance: pleasant woman, in no acute distress  Physical Exam  Vitals and nursing note reviewed.   Constitutional:       General: She is not in acute distress.     Appearance: Normal appearance. She is normal weight. She is not ill-appearing, toxic-appearing or diaphoretic.   HENT:      Head: Normocephalic and atraumatic.      Nose: Nose normal.      Mouth/Throat:      Mouth: Mucous membranes are moist.      Pharynx: Oropharynx is clear.   Eyes:      General: No scleral icterus.        Right eye: No discharge.         Left eye: No discharge.      Extraocular Movements: Extraocular movements intact.   Pulmonary:      Effort: Pulmonary effort is normal.   Musculoskeletal:      Cervical back: Normal range of motion.   Skin:     General: Skin is warm and dry.   Neurological:      General: No focal deficit present.      Mental Status: She is alert and oriented to person, place, and time.   Psychiatric:         Mood and Affect: Mood normal.         Behavior: Behavior normal.         Thought Content: Thought content normal.         Judgment: Judgment normal.          LABORATORY AND IMAGING STUDIES     Recent Labs   Lab Test 09/20/21  1011 07/01/21  0929 01/25/21  1028    WBC 6.0 6.9 6.5   RBC 3.96 4.34 4.45   HGB 13.4 14.3 15.0   HCT 39.4 43.3 45.2    100 102*   MCH 33.8* 32.9 33.7*   MCHC 34.0 33.0 33.2   RDW 11.7 12.0 11.8    218 214     Recent Labs   Lab Test 09/20/21  1011 07/01/21  0929 02/20/21  1034    138 139   POTASSIUM 5.0 5.0 4.8   CHLORIDE 108 108 107   CO2 22 28 29   ANIONGAP 8 2* 3   * 176* 188*   BUN 18 19 23   CR 1.07* 1.13* 1.23*   CURTIS 9.2 9.9 9.7         Recent Labs   Lab Test 09/20/21  1011   ELPM 0.5*   ELPINT Two monoclonal proteins (0.5 and 0.1 g/dL) seen in the gamma fraction, not previously characterized in our laboratory. Recommend serum and urine immunofixation for confirmation and further characterization if not previously performed elsewhere.   Pathologic significance requires clinical correlation. Radha Anderson M.D., Ph.D.     Recent Labs   Lab Test 10/13/21  1445   UIEP Monoclonal IgG immunoglobulin of kappa light chain type. Pathological significance requires clinical correlation.  Francisco Wells M.D., Ph.D.           Results for orders placed or performed in visit on 11/08/18   Mammogram - HIM Scan    Narrative    Result Impression    There is no radiographic evidence for malignancy.  Recommend   annual mammograms.    A lay language report of this examination will be provided to the patient.       MAMMOGRAM ASSESSMENT:  ACR 1 Negative  Result Narrative  XR MAMMO BILAT SCREENING [[id number]]    CLINICAL HISTORY:  This is an asymptomatic 62 y.o. patient.    INDICATION FOR EXAM: Mammogram Screening.    TECHNIQUE: CC & MLO views were obtained.  This digital study was evaluated   with the assistance of Computer-Aided Detection.       COMPARISON FILM: Yes  1/9/17 [facility]  8/31/15 [facility]    FINDINGS:  Mammographically, the breast tissue has scattered   fibroglandular densities.  There are no dominant masses, suspicious micro   calcifications or areas of architectural distortion.               ECOG PS: 0   ASSESSMENT AND RECOMMENDATIONS     Impression:   MGUS:  65 yo woman with a history of kidney transplant from her sister who presents for recommendations and continuing care for a monoclonal gammopathy of undetermined significant (MGUS) and to rule out multiple myeloma. The M protein spikes (there are two) are both relatively small and there is no evidence of any end organ damage within the workup completed to date; specifically, creatinine is at baseline (improved to 1.07 from earlier in the year at 1.23), there is no anemia(hgb 13.4), and the calcium is normal at 9.2. There is no bone survey yet but clinically the patient has no bony complaints. She also will need a 24 hour urine to quantify urinary excretion of monoclonal protein if any, including light chains. We can await the results of the 24 hr urine and the bone survey to decide whether to pursue a bone marrow biopsy but at this time, there are no findings to suggest myeloma.  Plan:   --bone survey  --24 hour urine for UPEP and light chains  --quantitative immunoglobulins to look for suppression    Return to Clinic:   --3 months    Dilan Lamb MD   of Medicine  Division of Hematology, Oncology and Transplantation  [facility]

## 2021-10-15 NOTE — NURSING NOTE
"Oncology Rooming Note    October 15, 2021 3:15 PM   Eliana Jane is a 65 year old female who presents for:    Chief Complaint   Patient presents with     Oncology Clinic Visit     Pt is here for a New Eval for Myeloma     Initial Vitals: Blood Pressure (Abnormal) 152/92   Pulse 63   Temperature 98  F (36.7  C) (Oral)   Respiration 16   Weight 88.1 kg (194 lb 4.8 oz)   Oxygen Saturation 96%   Body Mass Index 32.33 kg/m   Estimated body mass index is 32.33 kg/m  as calculated from the following:    Height as of 5/23/19: 1.651 m (5' 5\").    Weight as of this encounter: 88.1 kg (194 lb 4.8 oz). Body surface area is 2.01 meters squared.  No Pain (0) Comment: Data Unavailable   No LMP recorded.  Allergies reviewed: Yes  Medications reviewed: Yes    Medications: Medication refills not needed today.  Pharmacy name entered into Bluel[employer]:    [id number] - [facility] [place] - [address] AT [facility] - [place], [place] - [address]    Clinical concerns: none       Kera Mcdonald MA            "

## 2021-10-15 NOTE — LETTER
10/15/2021         RE: Eliana Jane  [address]  [place] [facility] [place] [facility] [address]        Dear Colleague,    Thank you for referring your patient, Eliana Jane, to the [facility]. Please see a copy of my visit note below.    [facility] PHYSICIANS  HEMATOLOGY AND MEDICAL ONCOLOGY    CONSULTATION/NEW PATIENT VISIT    PATIENT NAME: Eliana Jane   MRN# [id number]     Date of Visit: Oct 15, 2021    Referring Provider: Henrique Morrow MD  [address] [place] [address] [address]  [place] [address] YOB: 1955     Reason for visit: new patient visit for monoclonal protein    CHIEF COMPLAINT   Oncology Clinic Visit (Pt is here for a New Eval for Myeloma)       HISTORY OF PRESENTING ILLNESS   67 yo woman who presents for evaluation of monoclonal protein detected on recent SPEP obtained because of a low anion gap. Patient is feeling well otherwise.  S/P renal transplant 15 years ago on tacrolimus       PAST MEDICAL HISTORY     Past Medical History:   Diagnosis Date     Diabetes (H)      Hyperlipidemia      Hypothyroidism      MGUS (monoclonal gammopathy of unknown significance)      Renal transplant recipient       Renal transplant 8/16/07 from sister  Esophgeal reflux  Osteopenia 3-12-13  Pulmonary embolus 2/2/2015, neg scans but considered high probability clinically so treated  HTN  Asthma     PAST SURGICAL HISTORY     Past Surgical History:   Procedure Laterality Date     KNEE SURGERY Right 1/23/2015     KNEE SURGERY       NEPHRECTOMY TRANSPLANTED ORGAN           CURRENT OUTPATIENT MEDICATIONS     Current Outpatient Medications   Medication Sig     Albuterol (VENTOLIN IN) Inhale 1 Inhaler into the lungs daily as needed.     CELLCEPT (BRAND) 250 MG capsule Take 2 capsules (500 mg) by mouth 2 times daily GENERIC     citalopram (CELEXA) 20 MG tablet Take 2 tablets (40 mg) by mouth daily     empagliflozin (JARDIANCE) 10 MG TABS  tablet Take 1 tablet (10 mg) by mouth daily     fluticasone (VERAMYST) 27.5 MCG/SPRAY spray Spray 2 sprays into both nostrils daily     Fluticasone-Salmeterol (ADVAIR DISKUS IN) Inhale 2 Inhalers into the lungs daily as needed.     glimepiride (AMARYL) 1 MG tablet Take 3 tablets (3 mg) by mouth every morning (before breakfast)     levothyroxine (SYNTHROID/LEVOTHROID) 50 MCG tablet Take 1 tablet (50 mcg) by mouth daily     mometasone (NASONEX) 50 MCG/ACT nasal spray Spray 2 sprays into both nostrils daily Reported on 4/7/2017     mometasone-formoterol (DULERA) 200-5 MCG/ACT oral inhaler Inhale 1 puff into the lungs 2 times daily Reported on 4/7/2017     omeprazole (PRILOSEC) 20 MG CR capsule Take 1 capsule (20 mg) by mouth daily     predniSONE (DELTASONE) 5 MG tablet Take 1 tablet (5 mg) by mouth daily     PROGRAF (BRAND) 0.5 MG capsule Take 1 capsule (0.5 mg) by mouth 2 times daily Total dose = 1.5 mg twice a day     PROGRAF (BRAND) 1 MG capsule Take 1 capsule (1 mg) by mouth 2 times daily Total dose = 1.5 mg twice a day     simvastatin (ZOCOR) 20 MG tablet TAKE ONE TABLET BY MOUTH EVERY EVENING     No current facility-administered medications for this visit.        ALLERGIES     Allergies   Allergen Reactions     Dust Mites Unknown     Trees      asthma     .     SOCIAL HISTORY     Social History     Socioeconomic History     Marital status:      Spouse name: Not on file     Number of children: Not on file     Years of education: Not on file     Highest education level: Not on file   Occupational History     Not on file   Tobacco Use     Smoking status: Never Smoker     Smokeless tobacco: Never Used   Substance and Sexual Activity     Alcohol use: Not on file     Drug use: Not on file     Sexual activity: Not on file   Other Topics Concern     Parent/sibling w/ CABG, MI or angioplasty before 65F 55M? Not Asked   Social History Narrative     Not on file     Social Determinants of Health     Financial Resource  Strain:      Difficulty of Paying Living Expenses:    Food Insecurity:      Worried About Running Out of Food in the Last Year:      Ran Out of Food in the Last Year:    Transportation Needs:      Lack of Transportation (Medical):      Lack of Transportation (Non-Medical):    Physical Activity:      Days of Exercise per Week:      Minutes of Exercise per Session:    Stress:      Feeling of Stress :    Social Connections:      Frequency of Communication with Friends and Family:      Frequency of Social Gatherings with Friends and Family:      Attends Restorationist Services:      Active Member of Clubs or Organizations:      Attends Club or Organization Meetings:      Marital Status:    Intimate Partner Violence:      Fear of Current or Ex-Partner:      Emotionally Abused:      Physically Abused:      Sexually Abused:    Patient cares for  with Parkinson's and dementia; 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       FAMILY HISTORY     Family History   Problem Relation Age of Onset     Lung Cancer Father      Thyroid Disease Father      Hypothyroidism Mother      Heart Disease Mother      Heart Disease Father      Prostate Cancer Father      Cerebrovascular Disease Paternal Grandfather      Breast Cancer Maternal Grandmother      Hyperlipidemia Mother      Hyperlipidemia Father           REVIEW OF SYSTEMS   Review of Systems   Constitutional: Negative for chills, diaphoresis, fever, malaise/fatigue and weight loss.   HENT: Negative for congestion, ear discharge, ear pain, hearing loss, nosebleeds, sinus pain, sore throat and tinnitus.    Eyes: Negative for blurred vision, double vision, photophobia, pain, discharge and redness.   Respiratory: Negative for cough, hemoptysis, sputum production, shortness of breath, wheezing and stridor.    Cardiovascular: Negative for chest pain, palpitations, orthopnea,  claudication, leg swelling and PND.   Gastrointestinal: Negative for abdominal pain, blood in stool, constipation, diarrhea, heartburn, melena, nausea and vomiting.   Genitourinary: Negative for dysuria, flank pain, frequency, hematuria and urgency.   Musculoskeletal: Negative for back pain, falls, joint pain, myalgias and neck pain.   Skin: Negative for itching and rash.   Neurological: Negative for dizziness, tingling, tremors, sensory change, speech change, focal weakness, seizures, loss of consciousness, weakness and headaches.   Endo/Heme/Allergies: Negative for environmental allergies and polydipsia. Does not bruise/bleed easily.   Psychiatric/Behavioral: Negative for depression, hallucinations, memory loss, substance abuse and suicidal ideas. The patient is not nervous/anxious and does not have insomnia.           PHYSICAL EXAM   B/P: 152/92, T: 98, P: 63, R: 16  Wt Readings from Last 3 Encounters:   10/15/21 88.1 kg (194 lb 4.8 oz)   05/23/19 85.8 kg (189 lb 3.2 oz)   04/07/17 91 kg (200 lb 11.2 oz)     General appearance: pleasant woman, in no acute distress  Physical Exam  Vitals and nursing note reviewed.   Constitutional:       General: She is not in acute distress.     Appearance: Normal appearance. She is normal weight. She is not ill-appearing, toxic-appearing or diaphoretic.   HENT:      Head: Normocephalic and atraumatic.      Nose: Nose normal.      Mouth/Throat:      Mouth: Mucous membranes are moist.      Pharynx: Oropharynx is clear.   Eyes:      General: No scleral icterus.        Right eye: No discharge.         Left eye: No discharge.      Extraocular Movements: Extraocular movements intact.   Pulmonary:      Effort: Pulmonary effort is normal.   Musculoskeletal:      Cervical back: Normal range of motion.   Skin:     General: Skin is warm and dry.   Neurological:      General: No focal deficit present.      Mental Status: She is alert and oriented to person, place, and time.   Psychiatric:          Mood and Affect: Mood normal.         Behavior: Behavior normal.         Thought Content: Thought content normal.         Judgment: Judgment normal.          LABORATORY AND IMAGING STUDIES     Recent Labs   Lab Test 09/20/21  1011 07/01/21  0929 01/25/21  1028   WBC 6.0 6.9 6.5   RBC 3.96 4.34 4.45   HGB 13.4 14.3 15.0   HCT 39.4 43.3 45.2    100 102*   MCH 33.8* 32.9 33.7*   MCHC 34.0 33.0 33.2   RDW 11.7 12.0 11.8    218 214     Recent Labs   Lab Test 09/20/21  1011 07/01/21  0929 02/20/21  1034    138 139   POTASSIUM 5.0 5.0 4.8   CHLORIDE 108 108 107   CO2 22 28 29   ANIONGAP 8 2* 3   * 176* 188*   BUN 18 19 23   CR 1.07* 1.13* 1.23*   CURTIS 9.2 9.9 9.7         Recent Labs   Lab Test 09/20/21  1011   ELPM 0.5*   ELPINT Two monoclonal proteins (0.5 and 0.1 g/dL) seen in the gamma fraction, not previously characterized in our laboratory. Recommend serum and urine immunofixation for confirmation and further characterization if not previously performed elsewhere.   Pathologic significance requires clinical correlation. Radha Anderson M.D., Ph.D.     Recent Labs   Lab Test 10/13/21  1445   UIEP Monoclonal IgG immunoglobulin of kappa light chain type. Pathological significance requires clinical correlation.  Francisco Wells M.D., Ph.D.           Results for orders placed or performed in visit on 11/08/18   Mammogram - HIM Scan    Narrative    Result Impression    There is no radiographic evidence for malignancy.  Recommend   annual mammograms.    A lay language report of this examination will be provided to the patient.       MAMMOGRAM ASSESSMENT:  ACR 1 Negative  Result Narrative  XR MAMMO BILAT SCREENING [[id number]]    CLINICAL HISTORY:  This is an asymptomatic 62 y.o. patient.    INDICATION FOR EXAM: Mammogram Screening.    TECHNIQUE: CC & MLO views were obtained.  This digital study was evaluated   with the assistance of Computer-Aided Detection.       COMPARISON FILM: Yes  1/9/17  [facility]  8/31/15 [facility]    FINDINGS:  Mammographically, the breast tissue has scattered   fibroglandular densities.  There are no dominant masses, suspicious micro   calcifications or areas of architectural distortion.              ECOG PS: 0   ASSESSMENT AND RECOMMENDATIONS     Impression:   MGUS:  65 yo woman with a history of kidney transplant from her sister who presents for recommendations and continuing care for a monoclonal gammopathy of undetermined significant (MGUS) and to rule out multiple myeloma. The M protein spikes (there are two) are both relatively small and there is no evidence of any end organ damage within the workup completed to date; specifically, creatinine is at baseline (improved to 1.07 from earlier in the year at 1.23), there is no anemia(hgb 13.4), and the calcium is normal at 9.2. There is no bone survey yet but clinically the patient has no bony complaints. She also will need a 24 hour urine to quantify urinary excretion of monoclonal protein if any, including light chains. We can await the results of the 24 hr urine and the bone survey to decide whether to pursue a bone marrow biopsy but at this time, there are no findings to suggest myeloma.  Plan:   --bone survey  --24 hour urine for UPEP and light chains  --quantitative immunoglobulins to look for suppression    Return to Clinic:   --3 months    Dilan Lamb MD   of Medicine  Division of Hematology, Oncology and Transplantation  [facility]               Again, thank you for allowing me to participate in the care of your patient.        Sincerely,        Dilan Lamb MD

## 2021-10-22 ASSESSMENT — ENCOUNTER SYMPTOMS
TINGLING: 0
CHILLS: 0
HALLUCINATIONS: 0
MYALGIAS: 0
SEIZURES: 0
ABDOMINAL PAIN: 0
BLOOD IN STOOL: 0
CLAUDICATION: 0
EYE PAIN: 0
SHORTNESS OF BREATH: 0
PND: 0
MEMORY LOSS: 0
EYE REDNESS: 0
SENSORY CHANGE: 0
DIARRHEA: 0
HEADACHES: 0
FEVER: 0
NECK PAIN: 0
PALPITATIONS: 0
NAUSEA: 0
DYSURIA: 0
SORE THROAT: 0
CONSTIPATION: 0
DIAPHORESIS: 0
SINUS PAIN: 0
WEIGHT LOSS: 0
SPEECH CHANGE: 0
BRUISES/BLEEDS EASILY: 0
PHOTOPHOBIA: 0
DIZZINESS: 0
WHEEZING: 0
STRIDOR: 0
FALLS: 0
HEARTBURN: 0
POLYDIPSIA: 0
HEMOPTYSIS: 0
FREQUENCY: 0
INSOMNIA: 0
ORTHOPNEA: 0
DEPRESSION: 0
DOUBLE VISION: 0
FLANK PAIN: 0
WEAKNESS: 0
TREMORS: 0
SPUTUM PRODUCTION: 0
EYE DISCHARGE: 0
FOCAL WEAKNESS: 0
BLURRED VISION: 0
COUGH: 0
HEMATURIA: 0
VOMITING: 0
NERVOUS/ANXIOUS: 0
BACK PAIN: 0
LOSS OF CONSCIOUSNESS: 0

## 2021-10-22 ASSESSMENT — LIFESTYLE VARIABLES: SUBSTANCE_ABUSE: 0

## 2021-10-25 DIAGNOSIS — Z94.0 KIDNEY TRANSPLANTED: ICD-10-CM

## 2021-10-25 RX ORDER — PREDNISONE 5 MG/1
5 TABLET ORAL DAILY
Qty: 30 TABLET | Refills: 11 | Status: SHIPPED | OUTPATIENT
Start: 2021-10-25 | End: 2021-10-26

## 2021-10-25 NOTE — TELEPHONE ENCOUNTER
Pt needs urgent refill of prednisone 5mg to  [facility], epic wouldn't let me select anything to refill.  Pt last filled 9/14 for #30  Thank you!  Ginger Taveras CPhT  [facility]

## 2021-10-26 RX ORDER — PREDNISONE 5 MG/1
5 TABLET ORAL DAILY
Qty: 30 TABLET | Refills: 11 | Status: SHIPPED | OUTPATIENT
Start: 2021-10-26 | End: 2022-11-10

## 2021-11-04 ENCOUNTER — ANCILLARY PROCEDURE (OUTPATIENT)
Dept: GENERAL RADIOLOGY | Facility: CLINIC | Age: 66
End: 2021-11-04
Attending: INTERNAL MEDICINE
Payer: COMMERCIAL

## 2021-11-04 ENCOUNTER — TELEPHONE (OUTPATIENT)
Dept: GENERAL RADIOLOGY | Facility: CLINIC | Age: 66
End: 2021-11-04

## 2021-11-04 DIAGNOSIS — D47.2 MGUS (MONOCLONAL GAMMOPATHY OF UNKNOWN SIGNIFICANCE): ICD-10-CM

## 2021-11-04 PROCEDURE — 77075 RADEX OSSEOUS SURVEY COMPL: CPT | Performed by: RADIOLOGY

## 2021-11-04 NOTE — TELEPHONE ENCOUNTER
Called place to patient to see if she can come in earlier for her xray appt today. Direct number left on  [phone] to XR dept. NX, XR tech.

## 2021-12-07 DIAGNOSIS — Z94.0 KIDNEY TRANSPLANTED: Primary | ICD-10-CM

## 2021-12-07 RX ORDER — MYCOPHENOLATE MOFETIL 250 MG/1
500 CAPSULE ORAL 2 TIMES DAILY
Qty: 120 CAPSULE | Refills: 0 | Status: SHIPPED | OUTPATIENT
Start: 2021-12-07 | End: 2022-02-03

## 2022-01-07 ENCOUNTER — TELEPHONE (OUTPATIENT)
Dept: TRANSPLANT | Facility: CLINIC | Age: 67
End: 2022-01-07
Payer: COMMERCIAL

## 2022-01-07 NOTE — TELEPHONE ENCOUNTER
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 Yes    Return Call Needed  Same as documented in contacts section  When to return call?: Greater than one day: Route standard priority

## 2022-01-07 NOTE — TELEPHONE ENCOUNTER
Call returned to Eliana Jane, RNCC left detailed VM. RNCC instructed to to monitor bite over the weekend. If any fevers, increased pain, redness, swelling or discharge from site, she needs to go to ED over the weekend. RNCC asked for CB with questions.

## 2022-01-15 ENCOUNTER — HEALTH MAINTENANCE LETTER (OUTPATIENT)
Age: 67
End: 2022-01-15

## 2022-02-03 DIAGNOSIS — Z94.0 KIDNEY TRANSPLANTED: ICD-10-CM

## 2022-02-03 RX ORDER — MYCOPHENOLATE MOFETIL 250 MG/1
500 CAPSULE ORAL 2 TIMES DAILY
Qty: 120 CAPSULE | Refills: 0 | Status: SHIPPED | OUTPATIENT
Start: 2022-02-03 | End: 2023-04-10

## 2022-02-04 ENCOUNTER — LAB (OUTPATIENT)
Dept: LAB | Facility: CLINIC | Age: 67
End: 2022-02-04
Payer: COMMERCIAL

## 2022-02-04 DIAGNOSIS — D47.2 MGUS (MONOCLONAL GAMMOPATHY OF UNKNOWN SIGNIFICANCE): ICD-10-CM

## 2022-02-04 LAB
ALBUMIN SERPL-MCNC: 3.4 G/DL (ref 3.4–5)
ALP SERPL-CCNC: 114 U/L (ref 40–150)
ALT SERPL W P-5'-P-CCNC: 24 U/L (ref 0–50)
ANION GAP SERPL CALCULATED.3IONS-SCNC: <1 MMOL/L (ref 3–14)
AST SERPL W P-5'-P-CCNC: 9 U/L (ref 0–45)
BASOPHILS # BLD AUTO: 0 10E3/UL (ref 0–0.2)
BASOPHILS NFR BLD AUTO: 0 %
BILIRUB SERPL-MCNC: 0.4 MG/DL (ref 0.2–1.3)
BUN SERPL-MCNC: 16 MG/DL (ref 7–30)
CALCIUM SERPL-MCNC: 9.3 MG/DL (ref 8.5–10.1)
CHLORIDE BLD-SCNC: 108 MMOL/L (ref 94–109)
CO2 SERPL-SCNC: 28 MMOL/L (ref 20–32)
CREAT SERPL-MCNC: 1.09 MG/DL (ref 0.52–1.04)
EOSINOPHIL # BLD AUTO: 0.2 10E3/UL (ref 0–0.7)
EOSINOPHIL NFR BLD AUTO: 3 %
ERYTHROCYTE [DISTWIDTH] IN BLOOD BY AUTOMATED COUNT: 11.9 % (ref 10–15)
GFR SERPL CREATININE-BSD FRML MDRD: 55 ML/MIN/1.73M2
GLUCOSE BLD-MCNC: 199 MG/DL (ref 70–99)
HCT VFR BLD AUTO: 37.8 % (ref 35–47)
HGB BLD-MCNC: 12.8 G/DL (ref 11.7–15.7)
LYMPHOCYTES # BLD AUTO: 2 10E3/UL (ref 0.8–5.3)
LYMPHOCYTES NFR BLD AUTO: 38 %
MCH RBC QN AUTO: 33.8 PG (ref 26.5–33)
MCHC RBC AUTO-ENTMCNC: 33.9 G/DL (ref 31.5–36.5)
MCV RBC AUTO: 100 FL (ref 78–100)
MONOCYTES # BLD AUTO: 0.5 10E3/UL (ref 0–1.3)
MONOCYTES NFR BLD AUTO: 9 %
NEUTROPHILS # BLD AUTO: 2.6 10E3/UL (ref 1.6–8.3)
NEUTROPHILS NFR BLD AUTO: 50 %
PLATELET # BLD AUTO: 222 10E3/UL (ref 150–450)
POTASSIUM BLD-SCNC: 4.6 MMOL/L (ref 3.4–5.3)
PROT SERPL-MCNC: 6.8 G/DL (ref 6.8–8.8)
RBC # BLD AUTO: 3.79 10E6/UL (ref 3.8–5.2)
SODIUM SERPL-SCNC: 136 MMOL/L (ref 133–144)
TOTAL PROTEIN SERUM FOR ELP: 6.4 G/DL (ref 6.8–8.8)
WBC # BLD AUTO: 5.2 10E3/UL (ref 4–11)

## 2022-02-04 PROCEDURE — 36415 COLL VENOUS BLD VENIPUNCTURE: CPT

## 2022-02-04 PROCEDURE — 84155 ASSAY OF PROTEIN SERUM: CPT | Mod: 59

## 2022-02-04 PROCEDURE — 85025 COMPLETE CBC W/AUTO DIFF WBC: CPT

## 2022-02-04 PROCEDURE — 84165 PROTEIN E-PHORESIS SERUM: CPT | Performed by: PATHOLOGY

## 2022-02-04 PROCEDURE — 80053 COMPREHEN METABOLIC PANEL: CPT

## 2022-02-07 ENCOUNTER — MYC MEDICAL ADVICE (OUTPATIENT)
Dept: ONCOLOGY | Facility: CLINIC | Age: 67
End: 2022-02-07
Payer: COMMERCIAL

## 2022-02-07 LAB
ALBUMIN SERPL ELPH-MCNC: 3.8 G/DL (ref 3.7–5.1)
ALPHA1 GLOB SERPL ELPH-MCNC: 0.3 G/DL (ref 0.2–0.4)
ALPHA2 GLOB SERPL ELPH-MCNC: 0.7 G/DL (ref 0.5–0.9)
B-GLOBULIN SERPL ELPH-MCNC: 0.6 G/DL (ref 0.6–1)
GAMMA GLOB SERPL ELPH-MCNC: 0.9 G/DL (ref 0.7–1.6)
M PROTEIN SERPL ELPH-MCNC: 0.5 G/DL
PROT PATTERN SERPL ELPH-IMP: ABNORMAL

## 2022-02-08 NOTE — TELEPHONE ENCOUNTER
"Spoke with pt today, 2/8/22.  Stated we don't follow anion gap too closely though see her's is low.  When asked, she state she is \"feeling ok\" though still having discomfort from injured leg and not sleeping well so she is \"always tired\".    Discussed RBC is slightly below norm at 3.79 (low end of norm 3.80) and Hgb still within norm at 12.8 though agreed it is lower than her previous Hgb's (looking back to 7/16/29.  WBC is 5.2, also within norm though also lower than previous WBC's (also looking back to 7/16/19).  Discussed that these subtle changes and if they may relate to her leg injures would be better addressed by Dr. Lamb at appt on Friday.  Did point out that her monoclonal peak is stable with 4 months ago at 0.5, which is good.  Pt appreciated call back.  Spoke again with pt.  She stated she has not done 24 hr urine test collection yet.  She agreed to try to do collection starting tomorrow so she can turn it in on Thursday when she has lab appt in [place].  Sent copy of 24 hr urine collection instructions via [facility] message.  "

## 2022-02-10 ENCOUNTER — LAB (OUTPATIENT)
Dept: LAB | Facility: CLINIC | Age: 67
End: 2022-02-10
Payer: COMMERCIAL

## 2022-02-10 DIAGNOSIS — D47.2 MGUS (MONOCLONAL GAMMOPATHY OF UNKNOWN SIGNIFICANCE): ICD-10-CM

## 2022-02-10 DIAGNOSIS — Z79.899 ENCOUNTER FOR LONG-TERM CURRENT USE OF MEDICATION: ICD-10-CM

## 2022-02-10 DIAGNOSIS — Z48.298 AFTERCARE FOLLOWING ORGAN TRANSPLANT: ICD-10-CM

## 2022-02-10 DIAGNOSIS — Z94.0 KIDNEY REPLACED BY TRANSPLANT: ICD-10-CM

## 2022-02-10 LAB
ANION GAP SERPL CALCULATED.3IONS-SCNC: 4 MMOL/L (ref 3–14)
BUN SERPL-MCNC: 15 MG/DL (ref 7–30)
CALCIUM SERPL-MCNC: 9.7 MG/DL (ref 8.5–10.1)
CHLORIDE BLD-SCNC: 108 MMOL/L (ref 94–109)
CO2 SERPL-SCNC: 24 MMOL/L (ref 20–32)
CREAT SERPL-MCNC: 0.97 MG/DL (ref 0.52–1.04)
ERYTHROCYTE [DISTWIDTH] IN BLOOD BY AUTOMATED COUNT: 11.8 % (ref 10–15)
GFR SERPL CREATININE-BSD FRML MDRD: 64 ML/MIN/1.73M2
GLUCOSE BLD-MCNC: 216 MG/DL (ref 70–99)
HCT VFR BLD AUTO: 40.5 % (ref 35–47)
HGB BLD-MCNC: 13.4 G/DL (ref 11.7–15.7)
MCH RBC QN AUTO: 33.8 PG (ref 26.5–33)
MCHC RBC AUTO-ENTMCNC: 33.1 G/DL (ref 31.5–36.5)
MCV RBC AUTO: 102 FL (ref 78–100)
PLATELET # BLD AUTO: 256 10E3/UL (ref 150–450)
POTASSIUM BLD-SCNC: 4.5 MMOL/L (ref 3.4–5.3)
RBC # BLD AUTO: 3.97 10E6/UL (ref 3.8–5.2)
SODIUM SERPL-SCNC: 136 MMOL/L (ref 133–144)
TACROLIMUS BLD-MCNC: <3 UG/L (ref 5–15)
TME LAST DOSE: ABNORMAL H
TME LAST DOSE: ABNORMAL H
WBC # BLD AUTO: 5.5 10E3/UL (ref 4–11)

## 2022-02-10 PROCEDURE — 99000 SPECIMEN HANDLING OFFICE-LAB: CPT

## 2022-02-10 PROCEDURE — 81050 URINALYSIS VOLUME MEASURE: CPT | Performed by: PATHOLOGY

## 2022-02-10 PROCEDURE — 85027 COMPLETE CBC AUTOMATED: CPT

## 2022-02-10 PROCEDURE — 84166 PROTEIN E-PHORESIS/URINE/CSF: CPT | Performed by: PATHOLOGY

## 2022-02-10 PROCEDURE — 80048 BASIC METABOLIC PNL TOTAL CA: CPT

## 2022-02-10 PROCEDURE — 83883 ASSAY NEPHELOMETRY NOT SPEC: CPT | Mod: 90

## 2022-02-10 PROCEDURE — 36415 COLL VENOUS BLD VENIPUNCTURE: CPT

## 2022-02-10 PROCEDURE — 80197 ASSAY OF TACROLIMUS: CPT

## 2022-02-11 ENCOUNTER — TELEPHONE (OUTPATIENT)
Dept: TRANSPLANT | Facility: CLINIC | Age: 67
End: 2022-02-11
Payer: COMMERCIAL

## 2022-02-11 ENCOUNTER — VIRTUAL VISIT (OUTPATIENT)
Dept: ONCOLOGY | Facility: CLINIC | Age: 67
End: 2022-02-11
Attending: INTERNAL MEDICINE
Payer: COMMERCIAL

## 2022-02-11 DIAGNOSIS — Z94.0 KIDNEY TRANSPLANTED: Primary | ICD-10-CM

## 2022-02-11 DIAGNOSIS — D47.2 MGUS (MONOCLONAL GAMMOPATHY OF UNKNOWN SIGNIFICANCE): Primary | ICD-10-CM

## 2022-02-11 DIAGNOSIS — Z94.0 KIDNEY REPLACED BY TRANSPLANT: ICD-10-CM

## 2022-02-11 LAB
ALPHA1 GLOB MFR UR ELPH: 0 %
ALPHA2 GLOB MFR UR ELPH: 0 %
B-GLOBULIN MFR UR ELPH: 0 %
GAMMA GLOB MFR UR ELPH: 0 %
KAPPA LC UR-MCNC: <0.9 MG/DL
KAPPA LC/LAMBDA UR: NORMAL {RATIO} (ref 0.7–6.2)
LAMBDA LC UR-MCNC: <0.7 MG/DL
M PROTEIN MFR UR ELPH: 0 %
PROT PATTERN UR ELPH-IMP: NORMAL

## 2022-02-11 PROCEDURE — 99213 OFFICE O/P EST LOW 20 MIN: CPT | Mod: 95 | Performed by: INTERNAL MEDICINE

## 2022-02-11 RX ORDER — TACROLIMUS 0.5 MG/1
0.5 CAPSULE, GELATIN COATED ORAL 2 TIMES DAILY
Qty: 60 CAPSULE | Refills: 1 | Status: SHIPPED | OUTPATIENT
Start: 2022-02-11 | End: 2022-04-18

## 2022-02-11 RX ORDER — TACROLIMUS 1 MG/1
1 CAPSULE, GELATIN COATED ORAL 2 TIMES DAILY
Qty: 60 CAPSULE | Refills: 1 | Status: SHIPPED | OUTPATIENT
Start: 2022-02-11 | End: 2022-04-18

## 2022-02-11 ASSESSMENT — ENCOUNTER SYMPTOMS
WEIGHT LOSS: 0
CONSTIPATION: 0
BACK PAIN: 0
FREQUENCY: 0
FEVER: 0
SEIZURES: 0
MEMORY LOSS: 0
DOUBLE VISION: 0
BRUISES/BLEEDS EASILY: 0
HEADACHES: 0
FOCAL WEAKNESS: 0
HEMOPTYSIS: 0
HALLUCINATIONS: 0
SORE THROAT: 0
DIAPHORESIS: 0
POLYDIPSIA: 0
SPEECH CHANGE: 0
SPUTUM PRODUCTION: 0
HEMATURIA: 0
EYE DISCHARGE: 0
SENSORY CHANGE: 0
FALLS: 0
INSOMNIA: 0
BLURRED VISION: 0
DEPRESSION: 1
EYE REDNESS: 0
ABDOMINAL PAIN: 0
LOSS OF CONSCIOUSNESS: 0
HEARTBURN: 0
COUGH: 0
MYALGIAS: 0
SINUS PAIN: 0
FLANK PAIN: 0
WHEEZING: 0
VOMITING: 0
NERVOUS/ANXIOUS: 1
NAUSEA: 0
CHILLS: 0
NECK PAIN: 0
DYSURIA: 0
SHORTNESS OF BREATH: 0
STRIDOR: 0
TREMORS: 0
DIARRHEA: 0
PHOTOPHOBIA: 0
EYE PAIN: 0
TINGLING: 0
PALPITATIONS: 0
DIZZINESS: 0
PND: 0
WEAKNESS: 0
CLAUDICATION: 0
ORTHOPNEA: 0
BLOOD IN STOOL: 0

## 2022-02-11 ASSESSMENT — LIFESTYLE VARIABLES: SUBSTANCE_ABUSE: 0

## 2022-02-11 NOTE — PROGRESS NOTES
Eliana Jane  is being evaluated via a billable video visit.      How would you like to obtain your AVS? [email or web address]  For the video visit, send the invitation by: Send to e-mail at: [email or web address].  Will anyone else be joining your video visit? No      [facility] PHYSICIANS  HEMATOLOGY AND MEDICAL ONCOLOGY    FOLLOW-UP VIRTUAL PATIENT VISIT BY VIDEO    PATIENT NAME: Eliana Jane   MRN# [id number]     Date of Visit: Feb 11, 2022    Referring Provider: Referred Self, MD  No address on file YOB: 1955     Reason for visit:     CHIEF COMPLAINT   Video Visit       HISTORY OF PRESENTING ILLNESS     65 yo woman who presents for evaluation of monoclonal protein detected on recent SPEP obtained because of a low anion gap. S/P renal transplant 15 years ago on tacrolimus.    Bone survey: 11/4/21: negative for lytic lesions      INTERVAL HISTORY:    Stressful life with  who has dementia. No infections, bleeding, fevers, chills, night sweats. No weight change. Doing fine medically, working full time in addition to caregiving for        PAST MEDICAL HISTORY     Past Medical History:   Diagnosis Date     Diabetes (H)      Hyperlipidemia      Hypothyroidism      MGUS (monoclonal gammopathy of unknown significance)      Renal transplant recipient 08/16/2007    sister is donor        PAST SURGICAL HISTORY     Past Surgical History:   Procedure Laterality Date     KNEE SURGERY Right 1/23/2015     KNEE SURGERY       NEPHRECTOMY TRANSPLANTED ORGAN           CURRENT OUTPATIENT MEDICATIONS     Current Outpatient Medications   Medication Sig     Albuterol (VENTOLIN IN) Inhale 1 Inhaler into the lungs daily as needed.     CELLCEPT (BRAND) 250 MG capsule Take 2 capsules (500 mg) by mouth 2 times daily GENERIC     citalopram (CELEXA) 20 MG tablet Take 2 tablets (40 mg) by mouth daily     empagliflozin (JARDIANCE) 10 MG TABS tablet Take 1 tablet (10 mg) by mouth  daily     fluticasone (VERAMYST) 27.5 MCG/SPRAY spray Spray 2 sprays into both nostrils daily     Fluticasone-Salmeterol (ADVAIR DISKUS IN) Inhale 2 Inhalers into the lungs daily as needed.     glimepiride (AMARYL) 1 MG tablet Take 3 tablets (3 mg) by mouth every morning (before breakfast)     levothyroxine (SYNTHROID/LEVOTHROID) 50 MCG tablet Take 1 tablet (50 mcg) by mouth daily     mometasone (NASONEX) 50 MCG/ACT nasal spray Spray 2 sprays into both nostrils daily Reported on 4/7/2017     mometasone-formoterol (DULERA) 200-5 MCG/ACT oral inhaler Inhale 1 puff into the lungs 2 times daily Reported on 4/7/2017     omeprazole (PRILOSEC) 20 MG CR capsule Take 1 capsule (20 mg) by mouth daily     predniSONE (DELTASONE) 5 MG tablet Take 1 tablet (5 mg) by mouth daily     PROGRAF (BRAND) 0.5 MG capsule Take 1 capsule (0.5 mg) by mouth 2 times daily Total dose = 1.5 mg twice a day     PROGRAF (BRAND) 1 MG capsule Take 1 capsule (1 mg) by mouth 2 times daily Total dose = 1.5 mg twice a day     simvastatin (ZOCOR) 20 MG tablet TAKE ONE TABLET BY MOUTH EVERY EVENING     No current facility-administered medications for this visit.        ALLERGIES     Allergies   Allergen Reactions     Dust Mites Unknown     Trees      asthma     .     SOCIAL HISTORY     Social History     Socioeconomic History     Marital status:      Spouse name: Not on file     Number of children: Not on file     Years of education: Not on file     Highest education level: Not on file   Occupational History     Not on file   Tobacco Use     Smoking status: Never Smoker     Smokeless tobacco: Never Used   Substance and Sexual Activity     Alcohol use: Not on file     Drug use: Not on file     Sexual activity: Not on file   Other Topics Concern     Parent/sibling w/ CABG, MI or angioplasty before 65F 55M? Not Asked   Social History Narrative     Not on file     Social Determinants of Health     Financial Resource Strain: Not on file   Food  Insecurity: Not on file   Transportation Needs: Not on file   Physical Activity: Not on file   Stress: Not on file   Social Connections: Not on file   Intimate Partner Violence: Not on file   Housing Stability: Not on file          FAMILY HISTORY     Family History   Problem Relation Age of Onset     Lung Cancer Father      Thyroid Disease Father      Hypothyroidism Mother      Heart Disease Mother      Heart Disease Father      Prostate Cancer Father      Cerebrovascular Disease Paternal Grandfather      Breast Cancer Maternal Grandmother      Hyperlipidemia Mother      Hyperlipidemia Father           REVIEW OF SYSTEMS   Review of Systems   Constitutional: Negative for chills, diaphoresis, fever, malaise/fatigue and weight loss.   HENT: Negative for congestion, ear discharge, ear pain, hearing loss, nosebleeds, sinus pain, sore throat and tinnitus.    Eyes: Negative for blurred vision, double vision, photophobia, pain, discharge and redness.   Respiratory: Negative for cough, hemoptysis, sputum production, shortness of breath, wheezing and stridor.    Cardiovascular: Negative for chest pain, palpitations, orthopnea, claudication, leg swelling and PND.   Gastrointestinal: Negative for abdominal pain, blood in stool, constipation, diarrhea, heartburn, melena, nausea and vomiting.   Genitourinary: Negative for dysuria, flank pain, frequency, hematuria and urgency.   Musculoskeletal: Negative for back pain, falls, joint pain, myalgias and neck pain.   Skin: Negative for itching and rash.   Neurological: Negative for dizziness, tingling, tremors, sensory change, speech change, focal weakness, seizures, loss of consciousness, weakness and headaches.   Endo/Heme/Allergies: Negative for environmental allergies and polydipsia. Does not bruise/bleed easily.   Psychiatric/Behavioral: Positive for depression. Negative for hallucinations, memory loss, substance abuse and suicidal ideas. The patient is nervous/anxious. The  patient does not have insomnia.           PHYSICAL EXAM     Because of the ongoing COVID-19 public health crisis, the patient was seen via video. The patient appeared well and in no acute distress. Facial appearance was normal with intact cranial nerves, normal speech, no visible scleral icterus. EOMI. Neck ROM was normal with no masses seen. Affect was normal and appropriate.     LABORATORY AND IMAGING STUDIES     Recent Labs   Lab Test 02/10/22  0822 02/04/22  1516 09/20/21  1011   WBC 5.5 5.2 6.0   RBC 3.97 3.79* 3.96   HGB 13.4 12.8 13.4   HCT 40.5 37.8 39.4   * 100 100   MCH 33.8* 33.8* 33.8*   MCHC 33.1 33.9 34.0   RDW 11.8 11.9 11.7    222 212   NEUTROPHIL  --  50  --      Recent Labs   Lab Test 02/10/22  0822 02/04/22  1516 09/20/21  1011    136 138   POTASSIUM 4.5 4.6 5.0   CHLORIDE 108 108 108   CO2 24 28 22   ANIONGAP 4 <1* 8   * 199* 166*   BUN 15 16 18   CR 0.97 1.09* 1.07*   CURTIS 9.7 9.3 9.2     Recent Labs   Lab Test 02/04/22  1516   BILITOTAL 0.4   ALKPHOS 114   AST 9   ALT 24       Recent Labs   Lab Test 02/04/22  1516   ELPM 0.5*   ELPINT Two monoclonal proteins (about 0.5 g/dL and about 0.1 g/dL) seen in the gamma fraction, not previously characterized in our laboratory. Consider a serum and urine immunofixation for confirmation and further characterization if clinically indicated and not previously performed elsewhere. Pathologic significance requires clinical correlation. CLAY Johnson M.D., Ph.D., Pathologist ().     Recent Labs   Lab Test 02/10/22  0820 10/13/21  1445   UELPI Trace amount of albumin and a few trace globulin bands seen. No obvious monoclonal protein seen. Pathological significance requires clinical correlation.  We normally recommend a first morning voided urine to detect clinically significant proteinuria. The specific gravity of this specimen was only 1.005. Pathological significance requires clinical correlation.  CLAY Johnson M.D.,  Ph.D., Pathologist ()  --    UIEP  --  Monoclonal IgG immunoglobulin of kappa light chain type. Pathological significance requires clinical correlation.  Francisco Wells M.D., Ph.D.           Results for orders placed or performed in visit on 11/04/21   XR Bone Survey Complete    Narrative    XR BONE SURVEY COMPLETE 11/4/2021 4:51 PM     HISTORY: MGUS (monoclonal gammopathy of unknown significance)    COMPARISON: None.      Impression    IMPRESSION: No lytic or sclerotic lesions are evident. No fractures  are evident. Degenerative changes throughout the spine. Surgical clips  in the pelvis. Internal fixation of the right patella.    GETACHEW LÓPEZ MD         SYSTEM ID:  [id number]          ECOG PS: 0   ASSESSMENT AND RECOMMENDATIONS     Impression:  Doing well overall, no new issues. Protein in urine negative, and and serum small spikes are stable compared to last measurement; I suspect that these M proteins are related to chronic immune stimulation from renal transplantation. No evidence of progression.     Plan:  --observe; serum labs in 6 months to monitor  --recheck urine in 1 year (last 24 hr collection was negative)  --recommend DEXA scan soon as last scan was >10 years through primary care provider  --pt will consider whether she can benefit from counseling given the stress of caring for her     Return to Clinic:   6 months    I spent 20 minutes overall with the patient, with 15 minutes spent counseling regarding the patient's disease, its implications, as well as treatment options and the current treatment plan.    Dilan Lamb MD   of Medicine  Division of Hematology, Oncology and Transplantation  [facility]

## 2022-02-11 NOTE — LETTER
2/11/2022     RE: Eliana Jane  [address]  [place] [address]    Dear Colleague,    Thank you for referring your patient, Eliana Jane, to the [facility]. Please see a copy of my visit note below.    Eliana Jane  is being evaluated via a billable video visit.      How would you like to obtain your AVS? MyChart  For the video visit, send the invitation by: Send to e-mail at: [email or web address].  Will anyone else be joining your video visit? No      [facility] PHYSICIANS  HEMATOLOGY AND MEDICAL ONCOLOGY    FOLLOW-UP VIRTUAL PATIENT VISIT BY VIDEO    PATIENT NAME: Eliana Jane   MRN# [id number]     Date of Visit: Feb 11, 2022    Referring Provider: Referred Self, MD  No address on file YOB: 1955     Reason for visit:     CHIEF COMPLAINT   Video Visit       HISTORY OF PRESENTING ILLNESS     65 yo woman who presents for evaluation of monoclonal protein detected on recent SPEP obtained because of a low anion gap. S/P renal transplant 15 years ago on tacrolimus.    Bone survey: 11/4/21: negative for lytic lesions      INTERVAL HISTORY:    Stressful life with  who has dementia. No infections, bleeding, fevers, chills, night sweats. No weight change. Doing fine medically, working full time in addition to caregiving for        PAST MEDICAL HISTORY     Past Medical History:   Diagnosis Date     Diabetes (H)      Hyperlipidemia      Hypothyroidism      MGUS (monoclonal gammopathy of unknown significance)      Renal transplant recipient 08/16/2007    sister is donor        PAST SURGICAL HISTORY     Past Surgical History:   Procedure Laterality Date     KNEE SURGERY Right 1/23/2015     KNEE SURGERY       NEPHRECTOMY TRANSPLANTED ORGAN           CURRENT OUTPATIENT MEDICATIONS     Current Outpatient Medications   Medication Sig     Albuterol (VENTOLIN IN) Inhale 1 Inhaler into the lungs  daily as needed.     CELLCEPT (BRAND) 250 MG capsule Take 2 capsules (500 mg) by mouth 2 times daily GENERIC     citalopram (CELEXA) 20 MG tablet Take 2 tablets (40 mg) by mouth daily     empagliflozin (JARDIANCE) 10 MG TABS tablet Take 1 tablet (10 mg) by mouth daily     fluticasone (VERAMYST) 27.5 MCG/SPRAY spray Spray 2 sprays into both nostrils daily     Fluticasone-Salmeterol (ADVAIR DISKUS IN) Inhale 2 Inhalers into the lungs daily as needed.     glimepiride (AMARYL) 1 MG tablet Take 3 tablets (3 mg) by mouth every morning (before breakfast)     levothyroxine (SYNTHROID/LEVOTHROID) 50 MCG tablet Take 1 tablet (50 mcg) by mouth daily     mometasone (NASONEX) 50 MCG/ACT nasal spray Spray 2 sprays into both nostrils daily Reported on 4/7/2017     mometasone-formoterol (DULERA) 200-5 MCG/ACT oral inhaler Inhale 1 puff into the lungs 2 times daily Reported on 4/7/2017     omeprazole (PRILOSEC) 20 MG CR capsule Take 1 capsule (20 mg) by mouth daily     predniSONE (DELTASONE) 5 MG tablet Take 1 tablet (5 mg) by mouth daily     PROGRAF (BRAND) 0.5 MG capsule Take 1 capsule (0.5 mg) by mouth 2 times daily Total dose = 1.5 mg twice a day     PROGRAF (BRAND) 1 MG capsule Take 1 capsule (1 mg) by mouth 2 times daily Total dose = 1.5 mg twice a day     simvastatin (ZOCOR) 20 MG tablet TAKE ONE TABLET BY MOUTH EVERY EVENING     No current facility-administered medications for this visit.        ALLERGIES     Allergies   Allergen Reactions     Dust Mites Unknown     Trees      asthma     .     SOCIAL HISTORY     Social History     Socioeconomic History     Marital status:      Spouse name: Not on file     Number of children: Not on file     Years of education: Not on file     Highest education level: Not on file   Occupational History     Not on file   Tobacco Use     Smoking status: Never Smoker     Smokeless tobacco: Never Used   Substance and Sexual Activity     Alcohol use: Not on file     Drug use: Not on file      Sexual activity: Not on file   Other Topics Concern     Parent/sibling w/ CABG, MI or angioplasty before 65F 55M? Not Asked   Social History Narrative     Not on file     Social Determinants of Health     Financial Resource Strain: Not on file   Food Insecurity: Not on file   Transportation Needs: Not on file   Physical Activity: Not on file   Stress: Not on file   Social Connections: Not on file   Intimate Partner Violence: Not on file   Housing Stability: Not on file          FAMILY HISTORY     Family History   Problem Relation Age of Onset     Lung Cancer Father      Thyroid Disease Father      Hypothyroidism Mother      Heart Disease Mother      Heart Disease Father      Prostate Cancer Father      Cerebrovascular Disease Paternal Grandfather      Breast Cancer Maternal Grandmother      Hyperlipidemia Mother      Hyperlipidemia Father           REVIEW OF SYSTEMS   Review of Systems   Constitutional: Negative for chills, diaphoresis, fever, malaise/fatigue and weight loss.   HENT: Negative for congestion, ear discharge, ear pain, hearing loss, nosebleeds, sinus pain, sore throat and tinnitus.    Eyes: Negative for blurred vision, double vision, photophobia, pain, discharge and redness.   Respiratory: Negative for cough, hemoptysis, sputum production, shortness of breath, wheezing and stridor.    Cardiovascular: Negative for chest pain, palpitations, orthopnea, claudication, leg swelling and PND.   Gastrointestinal: Negative for abdominal pain, blood in stool, constipation, diarrhea, heartburn, melena, nausea and vomiting.   Genitourinary: Negative for dysuria, flank pain, frequency, hematuria and urgency.   Musculoskeletal: Negative for back pain, falls, joint pain, myalgias and neck pain.   Skin: Negative for itching and rash.   Neurological: Negative for dizziness, tingling, tremors, sensory change, speech change, focal weakness, seizures, loss of consciousness, weakness and headaches.    Endo/Heme/Allergies: Negative for environmental allergies and polydipsia. Does not bruise/bleed easily.   Psychiatric/Behavioral: Positive for depression. Negative for hallucinations, memory loss, substance abuse and suicidal ideas. The patient is nervous/anxious. The patient does not have insomnia.           PHYSICAL EXAM     Because of the ongoing COVID-19 public health crisis, the patient was seen via video. The patient appeared well and in no acute distress. Facial appearance was normal with intact cranial nerves, normal speech, no visible scleral icterus. EOMI. Neck ROM was normal with no masses seen. Affect was normal and appropriate.     LABORATORY AND IMAGING STUDIES     Recent Labs   Lab Test 02/10/22  0822 02/04/22  1516 09/20/21  1011   WBC 5.5 5.2 6.0   RBC 3.97 3.79* 3.96   HGB 13.4 12.8 13.4   HCT 40.5 37.8 39.4   * 100 100   MCH 33.8* 33.8* 33.8*   MCHC 33.1 33.9 34.0   RDW 11.8 11.9 11.7    222 212   NEUTROPHIL  --  50  --      Recent Labs   Lab Test 02/10/22  0822 02/04/22  1516 09/20/21  1011    136 138   POTASSIUM 4.5 4.6 5.0   CHLORIDE 108 108 108   CO2 24 28 22   ANIONGAP 4 <1* 8   * 199* 166*   BUN 15 16 18   CR 0.97 1.09* 1.07*   CURTIS 9.7 9.3 9.2     Recent Labs   Lab Test 02/04/22  1516   BILITOTAL 0.4   ALKPHOS 114   AST 9   ALT 24       Recent Labs   Lab Test 02/04/22  1516   ELPM 0.5*   ELPINT Two monoclonal proteins (about 0.5 g/dL and about 0.1 g/dL) seen in the gamma fraction, not previously characterized in our laboratory. Consider a serum and urine immunofixation for confirmation and further characterization if clinically indicated and not previously performed elsewhere. Pathologic significance requires clinical correlation. CLAY Johnson M.D., Ph.D., Pathologist ().     Recent Labs   Lab Test 02/10/22  0820 10/13/21  1445   UELPI Trace amount of albumin and a few trace globulin bands seen. No obvious monoclonal protein seen. Pathological  significance requires clinical correlation.  We normally recommend a first morning voided urine to detect clinically significant proteinuria. The specific gravity of this specimen was only 1.005. Pathological significance requires clinical correlation.  CLAY Johnson M.D., Ph.D., Pathologist ()  --    UIEP  --  Monoclonal IgG immunoglobulin of kappa light chain type. Pathological significance requires clinical correlation.  Francisco Wells M.D., Ph.D.           Results for orders placed or performed in visit on 11/04/21   XR Bone Survey Complete    Narrative    XR BONE SURVEY COMPLETE 11/4/2021 4:51 PM     HISTORY: MGUS (monoclonal gammopathy of unknown significance)    COMPARISON: None.      Impression    IMPRESSION: No lytic or sclerotic lesions are evident. No fractures  are evident. Degenerative changes throughout the spine. Surgical clips  in the pelvis. Internal fixation of the right patella.    GETACHEW LÓPEZ MD         SYSTEM ID:  [id number]          ECOG PS: 0   ASSESSMENT AND RECOMMENDATIONS     Impression:  Doing well overall, no new issues. Protein in urine negative, and and serum small spikes are stable compared to last measurement; I suspect that these M proteins are related to chronic immune stimulation from renal transplantation. No evidence of progression.     Plan:  --observe; serum labs in 6 months to monitor  --recheck urine in 1 year (last 24 hr collection was negative)  --recommend DEXA scan soon as last scan was >10 years through primary care provider  --pt will consider whether she can benefit from counseling given the stress of caring for her     Return to Clinic:   6 months    I spent 20 minutes overall with the patient, with 15 minutes spent counseling regarding the patient's disease, its implications, as well as treatment options and the current treatment plan.    Dilan Lamb MD   of Medicine  Division of Hematology, Oncology and  Transplantation  [facility]               Again, thank you for allowing me to participate in the care of your patient.        Sincerely,        Dilan Lamb MD

## 2022-02-11 NOTE — TELEPHONE ENCOUNTER
ISSUE:   Tacrolimus IR level <3.0 on 2/10, goal 4-6, dose 1.5 mg BID.    PLAN:   Please call patient and confirm this was an accurate 12-hour trough. Verify Tacrolimus IR dose 1.5 mg BID. Confirm no new medications or illness. Confirm no missed doses. If accurate trough and accurate dose, increase Tacrolimus IR dose to 2 mg BID and repeat labs in 1 week    OUTCOME:   Spoke with patient, they confirm accurate trough level and current dose 1.5 mg BID. Pt reports she missed a couple doses last week and thinks she may have also accidentally been taking 1 mg BID recently. Instructed pt to resume 1.5 mg BID and repeat tacrolimus level early next week. Reinforced to pt the importance of taking IS as prescribed and not missing any doses. Orders sent to preferred pharmacy for dose change and lab for repeat labs. Patient voiced understanding of plan.     Order entered for f/u tx neph appt. Pt aware she is due for annual appt.

## 2022-03-01 ENCOUNTER — LAB (OUTPATIENT)
Dept: LAB | Facility: CLINIC | Age: 67
End: 2022-03-01
Payer: COMMERCIAL

## 2022-03-01 DIAGNOSIS — Z94.0 KIDNEY TRANSPLANTED: ICD-10-CM

## 2022-03-01 LAB
ANION GAP SERPL CALCULATED.3IONS-SCNC: 3 MMOL/L (ref 3–14)
BUN SERPL-MCNC: 15 MG/DL (ref 7–30)
CALCIUM SERPL-MCNC: 10 MG/DL (ref 8.5–10.1)
CHLORIDE BLD-SCNC: 108 MMOL/L (ref 94–109)
CO2 SERPL-SCNC: 27 MMOL/L (ref 20–32)
CREAT SERPL-MCNC: 1.08 MG/DL (ref 0.52–1.04)
GFR SERPL CREATININE-BSD FRML MDRD: 56 ML/MIN/1.73M2
GLUCOSE BLD-MCNC: 143 MG/DL (ref 70–99)
POTASSIUM BLD-SCNC: 4.4 MMOL/L (ref 3.4–5.3)
SODIUM SERPL-SCNC: 138 MMOL/L (ref 133–144)
TACROLIMUS BLD-MCNC: 16 UG/L (ref 5–15)
TME LAST DOSE: ABNORMAL H
TME LAST DOSE: ABNORMAL H

## 2022-03-01 PROCEDURE — 80048 BASIC METABOLIC PNL TOTAL CA: CPT

## 2022-03-01 PROCEDURE — 36415 COLL VENOUS BLD VENIPUNCTURE: CPT

## 2022-03-01 PROCEDURE — 80197 ASSAY OF TACROLIMUS: CPT

## 2022-03-02 ENCOUNTER — TELEPHONE (OUTPATIENT)
Dept: TRANSPLANT | Facility: CLINIC | Age: 67
End: 2022-03-02
Payer: COMMERCIAL

## 2022-03-02 DIAGNOSIS — Z94.0 KIDNEY TRANSPLANTED: Primary | ICD-10-CM

## 2022-03-02 NOTE — TELEPHONE ENCOUNTER
ISSUE:   Tacrolimus IR level 16 on 3/1, goal 4-6, dose 2 mg BID.  Level appears to be inaccurate trough    PLAN:   Please call patient and confirm this was an accurate 12-hour trough. Verify Tacrolimus IR dose 2 mg BID. Confirm no new medications or illness. Confirm no missed doses. If accurate trough and accurate dose, stay on the same dose Tacrolimus IR and repeat labs in 1-2 weeks    OUTCOME:   Call placed to pt. Voicemail full, unable to LVM.

## 2022-03-08 NOTE — TELEPHONE ENCOUNTER
Call placed to patient. No answer. Voice message left with instructions listed below. [employer] message sent

## 2022-03-09 NOTE — TELEPHONE ENCOUNTER
Call placed to pt. Pt confirmed she did take tacrolimus prior to lab draw. Will repeat again ensuring an accurate 12 hour trough. Pt notes she is very stressed between caring for her own medical issues w/ tx and dm, as well as caring for her  w/ parkinsons and dementia and also working full time. Recommended pt repeat lab within a couple weeks when she is able.

## 2022-04-16 ENCOUNTER — LAB (OUTPATIENT)
Dept: LAB | Facility: CLINIC | Age: 67
End: 2022-04-16
Payer: COMMERCIAL

## 2022-04-16 DIAGNOSIS — Z94.0 KIDNEY TRANSPLANTED: ICD-10-CM

## 2022-04-16 PROCEDURE — 80197 ASSAY OF TACROLIMUS: CPT

## 2022-04-16 PROCEDURE — 36415 COLL VENOUS BLD VENIPUNCTURE: CPT

## 2022-04-17 LAB
TACROLIMUS BLD-MCNC: 3.4 UG/L (ref 5–15)
TME LAST DOSE: ABNORMAL H
TME LAST DOSE: ABNORMAL H

## 2022-04-18 ENCOUNTER — TELEPHONE (OUTPATIENT)
Dept: TRANSPLANT | Facility: CLINIC | Age: 67
End: 2022-04-18
Payer: COMMERCIAL

## 2022-04-18 DIAGNOSIS — Z94.0 KIDNEY TRANSPLANTED: Primary | ICD-10-CM

## 2022-04-18 DIAGNOSIS — Z94.0 KIDNEY REPLACED BY TRANSPLANT: ICD-10-CM

## 2022-04-18 RX ORDER — TACROLIMUS 1 MG/1
2 CAPSULE, GELATIN COATED ORAL 2 TIMES DAILY
Qty: 60 CAPSULE | Refills: 1 | Status: SHIPPED | OUTPATIENT
Start: 2022-04-18

## 2022-04-18 RX ORDER — TACROLIMUS 0.5 MG/1
CAPSULE, GELATIN COATED ORAL
Qty: 60 CAPSULE | Refills: 1 | Status: SHIPPED | OUTPATIENT
Start: 2022-04-18

## 2022-04-18 NOTE — TELEPHONE ENCOUNTER
ISSUE:   Tacrolimus IR level 3.4 on 4/16/22 10:28 AM, goal 4-6, dose 1.5 mg PO every 12 hours. Last dose date and time 4/15/22 11 PM.    PLAN:   Please call patient and confirm this was an accurate 12-hour trough. Verify Tacrolimus IR dose 1.5 mg BID. Confirm no new medications or illness. Confirm no missed doses. If accurate trough and accurate dose, increase Tacrolimus IR dose to 2 mg BID and repeat labs in 2 weeks.    OUTCOME:   [facility] message sent to patient.     Addendum: Lab orders placed in Epic and updated prescription for 2 mg BID sent to  [facility].    Leslie Marmolejo, RN, BSN  Solid Organ Transplant, Post Kidney and Pancreas  Transplant Care Coordinator  [phone]

## 2022-05-07 ENCOUNTER — HEALTH MAINTENANCE LETTER (OUTPATIENT)
Age: 67
End: 2022-05-07

## 2022-06-29 DIAGNOSIS — Z94.0 KIDNEY REPLACED BY TRANSPLANT: ICD-10-CM

## 2022-06-29 DIAGNOSIS — Z94.0 KIDNEY TRANSPLANTED: ICD-10-CM

## 2022-06-29 DIAGNOSIS — Z94.0 KIDNEY TRANSPLANTED: Primary | ICD-10-CM

## 2022-06-29 RX ORDER — TACROLIMUS 1 MG/1
2 CAPSULE, GELATIN COATED ORAL 2 TIMES DAILY
Qty: 60 CAPSULE | Refills: 1 | OUTPATIENT
Start: 2022-06-29

## 2022-06-29 RX ORDER — TACROLIMUS 1 MG/1
2 CAPSULE ORAL 2 TIMES DAILY
Qty: 120 CAPSULE | Refills: 11 | Status: SHIPPED | OUTPATIENT
Start: 2022-06-29 | End: 2023-04-10

## 2022-06-29 NOTE — TELEPHONE ENCOUNTER
Pt is totally out of Tacrolimus 1mg cap. Please send RX over asap.     Thank you.   [facility] mail order Services  [phone]

## 2022-07-07 ENCOUNTER — TELEPHONE (OUTPATIENT)
Dept: TRANSPLANT | Facility: CLINIC | Age: 67
End: 2022-07-07

## 2022-07-07 NOTE — TELEPHONE ENCOUNTER
General  Route to LPN    Reason for call: Patient wants her virtual appointments to be changes to telephone. She reports she doesn't use [employer]

## 2022-07-08 ENCOUNTER — TELEPHONE (OUTPATIENT)
Dept: TRANSPLANT | Facility: CLINIC | Age: 67
End: 2022-07-08

## 2022-07-08 NOTE — TELEPHONE ENCOUNTER
ISSUE: pt does not have MyChart and would like telephone visits.     PLAN/OUTCOME: Call placed to pt to discuss other methods of video visit for appointment this coming Monday, 7/11. Appointment not changed to telephone at this time.

## 2022-07-12 DIAGNOSIS — Z79.899 ENCOUNTER FOR LONG-TERM CURRENT USE OF MEDICATION: ICD-10-CM

## 2022-07-12 DIAGNOSIS — Z48.298 AFTERCARE FOLLOWING ORGAN TRANSPLANT: ICD-10-CM

## 2022-07-12 DIAGNOSIS — Z94.0 KIDNEY REPLACED BY TRANSPLANT: Primary | ICD-10-CM

## 2022-08-03 ENCOUNTER — TELEPHONE (OUTPATIENT)
Dept: TRANSPLANT | Facility: CLINIC | Age: 67
End: 2022-08-03

## 2022-08-03 NOTE — TELEPHONE ENCOUNTER
Eliana reported not feeling well and had to cancel her lab appt and her virtual with Nephrology today 08/03/2022 stated, 'she feels nausea's '  It looks like a repeat pattern in cancellations of her appts.,

## 2022-08-05 DIAGNOSIS — Z48.298 AFTERCARE FOLLOWING ORGAN TRANSPLANT: Primary | ICD-10-CM

## 2022-08-06 DIAGNOSIS — Z94.0 KIDNEY TRANSPLANTED: Primary | ICD-10-CM

## 2022-08-08 RX ORDER — MYCOPHENOLATE MOFETIL 250 MG/1
500 CAPSULE ORAL 2 TIMES DAILY
Qty: 120 CAPSULE | Refills: 1 | Status: SHIPPED | OUTPATIENT
Start: 2022-08-08 | End: 2022-11-28

## 2022-08-09 ENCOUNTER — LAB (OUTPATIENT)
Dept: LAB | Facility: CLINIC | Age: 67
End: 2022-08-09
Payer: COMMERCIAL

## 2022-08-09 DIAGNOSIS — Z79.899 ENCOUNTER FOR LONG-TERM CURRENT USE OF MEDICATION: ICD-10-CM

## 2022-08-09 DIAGNOSIS — Z94.0 KIDNEY REPLACED BY TRANSPLANT: ICD-10-CM

## 2022-08-09 DIAGNOSIS — D47.2 MGUS (MONOCLONAL GAMMOPATHY OF UNKNOWN SIGNIFICANCE): ICD-10-CM

## 2022-08-09 DIAGNOSIS — Z48.298 AFTERCARE FOLLOWING ORGAN TRANSPLANT: ICD-10-CM

## 2022-08-09 LAB
ALBUMIN SERPL-MCNC: 3.5 G/DL (ref 3.4–5)
ALP SERPL-CCNC: 110 U/L (ref 40–150)
ALT SERPL W P-5'-P-CCNC: 23 U/L (ref 0–50)
ANION GAP SERPL CALCULATED.3IONS-SCNC: 2 MMOL/L (ref 3–14)
AST SERPL W P-5'-P-CCNC: 12 U/L (ref 0–45)
BASOPHILS # BLD AUTO: 0 10E3/UL (ref 0–0.2)
BASOPHILS NFR BLD AUTO: 0 %
BILIRUB SERPL-MCNC: 0.6 MG/DL (ref 0.2–1.3)
BUN SERPL-MCNC: 20 MG/DL (ref 7–30)
CALCIUM SERPL-MCNC: 9.3 MG/DL (ref 8.5–10.1)
CHLORIDE BLD-SCNC: 109 MMOL/L (ref 94–109)
CO2 SERPL-SCNC: 25 MMOL/L (ref 20–32)
CREAT SERPL-MCNC: 1.16 MG/DL (ref 0.52–1.04)
EOSINOPHIL # BLD AUTO: 0.1 10E3/UL (ref 0–0.7)
EOSINOPHIL NFR BLD AUTO: 2 %
ERYTHROCYTE [DISTWIDTH] IN BLOOD BY AUTOMATED COUNT: 11.5 % (ref 10–15)
GFR SERPL CREATININE-BSD FRML MDRD: 51 ML/MIN/1.73M2
GLUCOSE BLD-MCNC: 199 MG/DL (ref 70–99)
HCT VFR BLD AUTO: 38.9 % (ref 35–47)
HGB BLD-MCNC: 13.4 G/DL (ref 11.7–15.7)
LYMPHOCYTES # BLD AUTO: 1.8 10E3/UL (ref 0.8–5.3)
LYMPHOCYTES NFR BLD AUTO: 32 %
MCH RBC QN AUTO: 34.5 PG (ref 26.5–33)
MCHC RBC AUTO-ENTMCNC: 34.4 G/DL (ref 31.5–36.5)
MCV RBC AUTO: 100 FL (ref 78–100)
MONOCYTES # BLD AUTO: 0.4 10E3/UL (ref 0–1.3)
MONOCYTES NFR BLD AUTO: 8 %
NEUTROPHILS # BLD AUTO: 3.3 10E3/UL (ref 1.6–8.3)
NEUTROPHILS NFR BLD AUTO: 58 %
PLATELET # BLD AUTO: 243 10E3/UL (ref 150–450)
POTASSIUM BLD-SCNC: 4.7 MMOL/L (ref 3.4–5.3)
PROT SERPL-MCNC: 7 G/DL (ref 6.8–8.8)
RBC # BLD AUTO: 3.88 10E6/UL (ref 3.8–5.2)
SODIUM SERPL-SCNC: 136 MMOL/L (ref 133–144)
TACROLIMUS BLD-MCNC: 5.2 UG/L (ref 5–15)
TME LAST DOSE: NORMAL H
TME LAST DOSE: NORMAL H
TOTAL PROTEIN SERUM FOR ELP: 6.4 G/DL (ref 6.4–8.3)
WBC # BLD AUTO: 5.7 10E3/UL (ref 4–11)

## 2022-08-09 PROCEDURE — 85025 COMPLETE CBC W/AUTO DIFF WBC: CPT

## 2022-08-09 PROCEDURE — 36415 COLL VENOUS BLD VENIPUNCTURE: CPT

## 2022-08-09 PROCEDURE — 84155 ASSAY OF PROTEIN SERUM: CPT | Mod: 59

## 2022-08-09 PROCEDURE — 84165 PROTEIN E-PHORESIS SERUM: CPT | Performed by: STUDENT IN AN ORGANIZED HEALTH CARE EDUCATION/TRAINING PROGRAM

## 2022-08-09 PROCEDURE — 80053 COMPREHEN METABOLIC PANEL: CPT

## 2022-08-09 PROCEDURE — 80197 ASSAY OF TACROLIMUS: CPT

## 2022-08-12 ENCOUNTER — VIRTUAL VISIT (OUTPATIENT)
Dept: ONCOLOGY | Facility: CLINIC | Age: 67
End: 2022-08-12
Attending: INTERNAL MEDICINE
Payer: COMMERCIAL

## 2022-08-12 ENCOUNTER — PATIENT OUTREACH (OUTPATIENT)
Dept: ONCOLOGY | Facility: CLINIC | Age: 67
End: 2022-08-12

## 2022-08-12 DIAGNOSIS — D47.2 MGUS (MONOCLONAL GAMMOPATHY OF UNKNOWN SIGNIFICANCE): Primary | ICD-10-CM

## 2022-08-12 PROCEDURE — 99212 OFFICE O/P EST SF 10 MIN: CPT | Mod: 95 | Performed by: INTERNAL MEDICINE

## 2022-08-12 PROCEDURE — G0463 HOSPITAL OUTPT CLINIC VISIT: HCPCS | Mod: PN,RTG | Performed by: INTERNAL MEDICINE

## 2022-08-12 ASSESSMENT — ENCOUNTER SYMPTOMS
FLANK PAIN: 0
DIARRHEA: 0
WEAKNESS: 0
NECK PAIN: 0
HEADACHES: 0
EYE DISCHARGE: 0
PND: 0
HEMOPTYSIS: 0
SPEECH CHANGE: 0
COUGH: 0
ABDOMINAL PAIN: 0
SENSORY CHANGE: 0
WHEEZING: 0
BACK PAIN: 0
SINUS PAIN: 0
HALLUCINATIONS: 0
ORTHOPNEA: 0
NERVOUS/ANXIOUS: 0
FALLS: 0
CHILLS: 0
DIZZINESS: 0
HEARTBURN: 0
SPUTUM PRODUCTION: 0
STRIDOR: 0
DYSURIA: 0
BLOOD IN STOOL: 0
DIAPHORESIS: 0
MEMORY LOSS: 0
CLAUDICATION: 0
DEPRESSION: 0
SEIZURES: 0
PALPITATIONS: 0
EYE REDNESS: 0
EYE PAIN: 0
MYALGIAS: 0
TINGLING: 0
FREQUENCY: 0
LOSS OF CONSCIOUSNESS: 0
POLYDIPSIA: 0
TREMORS: 0
HEMATURIA: 0
BLURRED VISION: 0
SHORTNESS OF BREATH: 0
FOCAL WEAKNESS: 0
PHOTOPHOBIA: 0
SORE THROAT: 0
CONSTIPATION: 0
INSOMNIA: 0
WEIGHT LOSS: 0
BRUISES/BLEEDS EASILY: 0
VOMITING: 0
DOUBLE VISION: 0
NAUSEA: 0
FEVER: 0

## 2022-08-12 ASSESSMENT — LIFESTYLE VARIABLES: SUBSTANCE_ABUSE: 0

## 2022-08-12 NOTE — PROGRESS NOTES
Eliana is a 66 year old who is being evaluated via a billable video visit.      How would you like to obtain your AVS? Mail a copy  If the video visit is dropped, the invitation should be resent by: Text to cell phone: [phone]  Will anyone else be joining your video visit? No     Medications and allergies have been reviewed.    Jennifer Freire, Rhiannon Facilitator/LPN      Video-Visit Details    Video Start Time: 2:02 PM    Type of service:  Video Visit    Video End Time:2:17 PM    Originating Location (pt. Location): Home    Distant Location (provider location):  [facility]     Platform used for Video Visit: [facility] PHYSICIANS  HEMATOLOGY AND MEDICAL ONCOLOGY    FOLLOW-UP VIRTUAL PATIENT VISIT BY VIDEO    PATIENT NAME: Eliana Jane   MRN# [id number]     Date of Visit: Aug 12, 2022    Referring Provider: Jennifer Ville [address] [place] [address] YOB: 1955     Reason for visit: follow-up for MGUS    CHIEF COMPLAINT   Video Visit (ONC: Myeloma 6 month follow up)       HISTORY OF PRESENTING ILLNESS     67 yo woman who presents for evaluation of monoclonal protein detected on recent SPEP obtained because of a low anion gap. S/P renal transplant 15 years ago on tacrolimus.     Bone survey: 11/4/21: negative for lytic lesions    INTERVAL HISTORY:    Continued stress at home with  at home with dementia. Feeling well, no change in medications except increase in glimepiride. No COVID lately; had COVID more than 2 years ago. Vaccinated now.      PAST MEDICAL HISTORY     Past Medical History:   Diagnosis Date     Diabetes (H)      Hyperlipidemia      Hypothyroidism      MGUS (monoclonal gammopathy of unknown significance)      Renal transplant recipient 08/16/2007    sister is donor        PAST SURGICAL HISTORY     Past Surgical History:   Procedure Laterality Date     KNEE SURGERY Right 1/23/2015      KNEE SURGERY       NEPHRECTOMY TRANSPLANTED ORGAN           CURRENT OUTPATIENT MEDICATIONS     Current Outpatient Medications   Medication Sig     Albuterol (VENTOLIN IN) Inhale 1 Inhaler into the lungs daily as needed.     CELLCEPT (BRAND) 250 MG capsule Take 2 capsules (500 mg) by mouth 2 times daily GENERIC     citalopram (CELEXA) 20 MG tablet Take 2 tablets (40 mg) by mouth daily     empagliflozin (JARDIANCE) 10 MG TABS tablet Take 1 tablet (10 mg) by mouth daily     fluticasone (VERAMYST) 27.5 MCG/SPRAY spray Spray 2 sprays into both nostrils daily     Fluticasone-Salmeterol (ADVAIR DISKUS IN) Inhale 2 Inhalers into the lungs daily as needed.     glimepiride (AMARYL) 1 MG tablet Take 3 tablets (3 mg) by mouth every morning (before breakfast)     levothyroxine (SYNTHROID/LEVOTHROID) 50 MCG tablet Take 1 tablet (50 mcg) by mouth daily     mometasone (NASONEX) 50 MCG/ACT nasal spray Spray 2 sprays into both nostrils daily Reported on 4/7/2017     mometasone-formoterol (DULERA) 200-5 MCG/ACT inhaler Inhale 1 puff into the lungs 2 times daily Reported on 4/7/2017     mycophenolate (GENERIC EQUIVALENT) 250 MG capsule Take 2 capsules (500 mg) by mouth 2 times daily     omeprazole (PRILOSEC) 20 MG CR capsule Take 1 capsule (20 mg) by mouth daily     predniSONE (DELTASONE) 5 MG tablet Take 1 tablet (5 mg) by mouth daily     PROGRAF (BRAND) 0.5 MG capsule HOLD     PROGRAF (BRAND) 1 MG capsule Take 2 capsules (2 mg) by mouth 2 times daily Total dose = 2 mg twice a day     simvastatin (ZOCOR) 20 MG tablet TAKE ONE TABLET BY MOUTH EVERY EVENING     tacrolimus (GENERIC EQUIVALENT) 1 MG capsule Take 2 capsules (2 mg) by mouth 2 times daily     No current facility-administered medications for this visit.        ALLERGIES     Allergies   Allergen Reactions     Dust Mites Unknown     Trees      asthma     .     SOCIAL HISTORY     Social History     Socioeconomic History     Marital status:      Spouse name: Not on  file     Number of children: Not on file     Years of education: Not on file     Highest education level: Not on file   Occupational History     Not on file   Tobacco Use     Smoking status: Never Smoker     Smokeless tobacco: Never Used   Substance and Sexual Activity     Alcohol use: Not on file     Drug use: Not on file     Sexual activity: Not on file   Other Topics Concern     Parent/sibling w/ CABG, MI or angioplasty before 65F 55M? Not Asked   Social History Narrative     Not on file     Social Determinants of Health     Financial Resource Strain: Not on file   Food Insecurity: Not on file   Transportation Needs: Not on file   Physical Activity: Not on file   Stress: Not on file   Social Connections: Not on file   Intimate Partner Violence: Not on file   Housing Stability: Not on file          FAMILY HISTORY     Family History   Problem Relation Age of Onset     Lung Cancer Father      Thyroid Disease Father      Hypothyroidism Mother      Heart Disease Mother      Heart Disease Father      Prostate Cancer Father      Cerebrovascular Disease Paternal Grandfather      Breast Cancer Maternal Grandmother      Hyperlipidemia Mother      Hyperlipidemia Father           REVIEW OF SYSTEMS   Review of Systems   Constitutional: Negative for chills, diaphoresis, fever, malaise/fatigue and weight loss.   HENT: Negative for congestion, ear discharge, ear pain, hearing loss, nosebleeds, sinus pain, sore throat and tinnitus.    Eyes: Negative for blurred vision, double vision, photophobia, pain, discharge and redness.   Respiratory: Negative for cough, hemoptysis, sputum production, shortness of breath, wheezing and stridor.    Cardiovascular: Negative for chest pain, palpitations, orthopnea, claudication, leg swelling and PND.   Gastrointestinal: Negative for abdominal pain, blood in stool, constipation, diarrhea, heartburn, melena, nausea and vomiting.   Genitourinary: Negative for dysuria, flank pain, frequency,  hematuria and urgency.   Musculoskeletal: Negative for back pain, falls, joint pain, myalgias and neck pain.   Skin: Negative for itching and rash.   Neurological: Negative for dizziness, tingling, tremors, sensory change, speech change, focal weakness, seizures, loss of consciousness, weakness and headaches.   Endo/Heme/Allergies: Negative for environmental allergies and polydipsia. Does not bruise/bleed easily.   Psychiatric/Behavioral: Negative for depression, hallucinations, memory loss, substance abuse and suicidal ideas. The patient is not nervous/anxious and does not have insomnia.           PHYSICAL EXAM       Because of the ongoing COVID-19 public health crisis, the patient was seen via video. The patient appeared well and in no acute distress. Facial appearance was normal with intact cranial nerves, normal speech, no visible scleral icterus. EOMI. Neck ROM was normal with no masses seen. Affect was normal and appropriate.         LABORATORY AND IMAGING STUDIES     Recent Labs   Lab Test 08/09/22  0821 02/10/22  0822 02/04/22  1516   WBC 5.7 5.5 5.2   RBC 3.88 3.97 3.79*   HGB 13.4 13.4 12.8   HCT 38.9 40.5 37.8    102* 100   MCH 34.5* 33.8* 33.8*   MCHC 34.4 33.1 33.9   RDW 11.5 11.8 11.9    256 222   NEUTROPHIL 58  --  50     Recent Labs   Lab Test 08/09/22  0821 03/01/22  1133 02/10/22  0822    138 136   POTASSIUM 4.7 4.4 4.5   CHLORIDE 109 108 108   CO2 25 27 24   ANIONGAP 2* 3 4   * 143* 216*   BUN 20 15 15   CR 1.16* 1.08* 0.97   CURTIS 9.3 10.0 9.7     Recent Labs   Lab Test 08/09/22  0821 02/04/22  1516   BILITOTAL 0.6 0.4   ALKPHOS 110 114   AST 12 9   ALT 23 24       Recent Labs   Lab Test 08/09/22 0821   ELPM 0.5*   ELPINT Two monoclonal proteins (0.5 and 0.1 g/dL) seen in the gamma fraction. Previously characterized in our laboratory on 10/13/2021 as a monoclonal IgG immunoglobulin of lambda light chain type and IgA immunoglobulin of kappa light chain type. Pathologic  significance requires clinical correlation. Radha Anderson M.D., Ph.D.     Recent Labs   Lab Test 02/10/22  0820 10/13/21  1445   UELPI Trace amount of albumin and a few trace globulin bands seen. No obvious monoclonal protein seen. Pathological significance requires clinical correlation.  We normally recommend a first morning voided urine to detect clinically significant proteinuria. The specific gravity of this specimen was only 1.005. Pathological significance requires clinical correlation.  CLAY Johnson M.D., Ph.D., Pathologist ()  --    UIEP  --  Monoclonal IgG immunoglobulin of kappa light chain type. Pathological significance requires clinical correlation.  Francisco Wells M.D., Ph.D.         Results for orders placed or performed in visit on 11/04/21   XR Bone Survey Complete    Narrative    XR BONE SURVEY COMPLETE 11/4/2021 4:51 PM     HISTORY: MGUS (monoclonal gammopathy of unknown significance)    COMPARISON: None.      Impression    IMPRESSION: No lytic or sclerotic lesions are evident. No fractures  are evident. Degenerative changes throughout the spine. Surgical clips  in the pelvis. Internal fixation of the right patella.    GETACHEW LÓPEZ MD         SYSTEM ID:  [id number]          EC[facility] PS: 0   ASSESSMENT AND RECOMMENDATIONS     Impression:  Doing well from an MGUS standpoint with no change in M protein level (0.5 g/dL) on most recent SPEP. Creatinine and calcium, and hemoglobin are all normal. No evidence of progression. Pt is clinically doing well overall. Next visit will order light chains, quantitative immunoglobulins.    Plan:  -observe; repeat labs in 3 and 6 months with visit in 6 months    Return to Clinic:   6 months      Dilan Lamb MD   of Medicine  Division of Hematology, Oncology and Transplantation  [facility]

## 2022-08-12 NOTE — LETTER
8/12/2022         RE: Eliana Jane  [address]  [place] [facility] [address]        Dear Colleague,    Thank you for referring your patient, Eliana Jane, to the [facility]. Please see a copy of my visit note below.    Eliana is a 66 year old who is being evaluated via a billable video visit.      How would you like to obtain your AVS? Mail a copy  If the video visit is dropped, the invitation should be resent by: Text to cell phone: [phone]  Will anyone else be joining your video visit? No     Medications and allergies have been reviewed.    Jennifer Freire, Virtual Facilitator/LPN      Video-Visit Details    Video Start Time: 2:02 PM    Type of service:  Video Visit    Video End Time:2:17 PM    Originating Location (pt. Location): Home    Distant Location (provider location):  [facility]     Platform used for Video Visit: [facility] PHYSICIANS  HEMATOLOGY AND MEDICAL ONCOLOGY    FOLLOW-UP VIRTUAL PATIENT VISIT BY VIDEO    PATIENT NAME: Eliana Jane   MRN# [id number]     Date of Visit: Aug 12, 2022    Referring Provider: Rachel V[place] [address] [place] [address] YOB: 1955     Reason for visit: follow-up for MGUS    CHIEF COMPLAINT   Video Visit (ONC: Myeloma 6 month follow up)       HISTORY OF PRESENTING ILLNESS     65 yo woman who presents for evaluation of monoclonal protein detected on recent SPEP obtained because of a low anion gap. S/P renal transplant 15 years ago on tacrolimus.     Bone survey: 11/4/21: negative for lytic lesions    INTERVAL HISTORY:    Continued stress at home with  at home with dementia. Feeling well, no change in medications except increase in glimepiride. No COVID lately; had COVID more than 2 years ago. Vaccinated now.      PAST MEDICAL HISTORY     Past Medical History:   Diagnosis Date     Diabetes (H)       Hyperlipidemia      Hypothyroidism      MGUS (monoclonal gammopathy of unknown significance)      Renal transplant recipient 08/16/2007    sister is donor        PAST SURGICAL HISTORY     Past Surgical History:   Procedure Laterality Date     KNEE SURGERY Right 1/23/2015     KNEE SURGERY       NEPHRECTOMY TRANSPLANTED ORGAN           CURRENT OUTPATIENT MEDICATIONS     Current Outpatient Medications   Medication Sig     Albuterol (VENTOLIN IN) Inhale 1 Inhaler into the lungs daily as needed.     CELLCEPT (BRAND) 250 MG capsule Take 2 capsules (500 mg) by mouth 2 times daily GENERIC     citalopram (CELEXA) 20 MG tablet Take 2 tablets (40 mg) by mouth daily     empagliflozin (JARDIANCE) 10 MG TABS tablet Take 1 tablet (10 mg) by mouth daily     fluticasone (VERAMYST) 27.5 MCG/SPRAY spray Spray 2 sprays into both nostrils daily     Fluticasone-Salmeterol (ADVAIR DISKUS IN) Inhale 2 Inhalers into the lungs daily as needed.     glimepiride (AMARYL) 1 MG tablet Take 3 tablets (3 mg) by mouth every morning (before breakfast)     levothyroxine (SYNTHROID/LEVOTHROID) 50 MCG tablet Take 1 tablet (50 mcg) by mouth daily     mometasone (NASONEX) 50 MCG/ACT nasal spray Spray 2 sprays into both nostrils daily Reported on 4/7/2017     mometasone-formoterol (DULERA) 200-5 MCG/ACT inhaler Inhale 1 puff into the lungs 2 times daily Reported on 4/7/2017     mycophenolate (GENERIC EQUIVALENT) 250 MG capsule Take 2 capsules (500 mg) by mouth 2 times daily     omeprazole (PRILOSEC) 20 MG CR capsule Take 1 capsule (20 mg) by mouth daily     predniSONE (DELTASONE) 5 MG tablet Take 1 tablet (5 mg) by mouth daily     PROGRAF (BRAND) 0.5 MG capsule HOLD     PROGRAF (BRAND) 1 MG capsule Take 2 capsules (2 mg) by mouth 2 times daily Total dose = 2 mg twice a day     simvastatin (ZOCOR) 20 MG tablet TAKE ONE TABLET BY MOUTH EVERY EVENING     tacrolimus (GENERIC EQUIVALENT) 1 MG capsule Take 2 capsules (2 mg) by mouth 2 times daily     No  current facility-administered medications for this visit.        ALLERGIES     Allergies   Allergen Reactions     Dust Mites Unknown     Trees      asthma     .     SOCIAL HISTORY     Social History     Socioeconomic History     Marital status:      Spouse name: Not on file     Number of children: Not on file     Years of education: Not on file     Highest education level: Not on file   Occupational History     Not on file   Tobacco Use     Smoking status: Never Smoker     Smokeless tobacco: Never Used   Substance and Sexual Activity     Alcohol use: Not on file     Drug use: Not on file     Sexual activity: Not on file   Other Topics Concern     Parent/sibling w/ CABG, MI or angioplasty before 65F 55M? Not Asked   Social History Narrative     Not on file     Social Determinants of Health     Financial Resource Strain: Not on file   Food Insecurity: Not on file   Transportation Needs: Not on file   Physical Activity: Not on file   Stress: Not on file   Social Connections: Not on file   Intimate Partner Violence: Not on file   Housing Stability: Not on file          FAMILY HISTORY     Family History   Problem Relation Age of Onset     Lung Cancer Father      Thyroid Disease Father      Hypothyroidism Mother      Heart Disease Mother      Heart Disease Father      Prostate Cancer Father      Cerebrovascular Disease Paternal Grandfather      Breast Cancer Maternal Grandmother      Hyperlipidemia Mother      Hyperlipidemia Father           REVIEW OF SYSTEMS   Review of Systems   Constitutional: Negative for chills, diaphoresis, fever, malaise/fatigue and weight loss.   HENT: Negative for congestion, ear discharge, ear pain, hearing loss, nosebleeds, sinus pain, sore throat and tinnitus.    Eyes: Negative for blurred vision, double vision, photophobia, pain, discharge and redness.   Respiratory: Negative for cough, hemoptysis, sputum production, shortness of breath, wheezing and stridor.    Cardiovascular:  Negative for chest pain, palpitations, orthopnea, claudication, leg swelling and PND.   Gastrointestinal: Negative for abdominal pain, blood in stool, constipation, diarrhea, heartburn, melena, nausea and vomiting.   Genitourinary: Negative for dysuria, flank pain, frequency, hematuria and urgency.   Musculoskeletal: Negative for back pain, falls, joint pain, myalgias and neck pain.   Skin: Negative for itching and rash.   Neurological: Negative for dizziness, tingling, tremors, sensory change, speech change, focal weakness, seizures, loss of consciousness, weakness and headaches.   Endo/Heme/Allergies: Negative for environmental allergies and polydipsia. Does not bruise/bleed easily.   Psychiatric/Behavioral: Negative for depression, hallucinations, memory loss, substance abuse and suicidal ideas. The patient is not nervous/anxious and does not have insomnia.           PHYSICAL EXAM       Because of the ongoing COVID- public health crisis, the patient was seen via video. The patient appeared well and in no acute distress. Facial appearance was normal with intact cranial nerves, normal speech, no visible scleral icterus. EOMI. Neck ROM was normal with no masses seen. Affect was normal and appropriate.         LABORATORY AND IMAGING STUDIES     Recent Labs   Lab Test 08/09/22  0821 02/10/22  0822 02/04/22  1516   WBC 5.7 5.5 5.2   RBC 3.88 3.97 3.79*   HGB 13.4 13.4 12.8   HCT 38.9 40.5 37.8    102* 100   MCH 34.5* 33.8* 33.8*   MCHC 34.4 33.1 33.9   RDW 11.5 11.8 11.9    256 222   NEUTROPHIL 58  --  50     Recent Labs   Lab Test 08/09/22  0821 03/01/22  1133 02/10/22  0822    138 136   POTASSIUM 4.7 4.4 4.5   CHLORIDE 109 108 108   CO2 25 27 24   ANIONGAP 2* 3 4   * 143* 216*   BUN 20 15 15   CR 1.16* 1.08* 0.97   CURTIS 9.3 10.0 9.7     Recent Labs   Lab Test 08/09/22  0821 02/04/22  1516   BILITOTAL 0.6 0.4   ALKPHOS 110 114   AST 12 9   ALT 23 24       Recent Labs   Lab Test  08/09/22  0821   ELPM 0.5*   ELPINT Two monoclonal proteins (0.5 and 0.1 g/dL) seen in the gamma fraction. Previously characterized in our laboratory on 10/13/2021 as a monoclonal IgG immunoglobulin of lambda light chain type and IgA immunoglobulin of kappa light chain type. Pathologic significance requires clinical correlation. Radha Anderson M.D., Ph.D.     Recent Labs   Lab Test 02/10/22  0820 10/13/21  1445   UELPI Trace amount of albumin and a few trace globulin bands seen. No obvious monoclonal protein seen. Pathological significance requires clinical correlation.  We normally recommend a first morning voided urine to detect clinically significant proteinuria. The specific gravity of this specimen was only 1.005. Pathological significance requires clinical correlation.  CLAY Johnson M.D., Ph.D., Pathologist ()  --    UIEP  --  Monoclonal IgG immunoglobulin of kappa light chain type. Pathological significance requires clinical correlation.  Francisco Wells M.D., Ph.D.         Results for orders placed or performed in visit on 11/04/21   XR Bone Survey Complete    Narrative    XR BONE SURVEY COMPLETE 11/4/2021 4:51 PM     HISTORY: MGUS (monoclonal gammopathy of unknown significance)    COMPARISON: None.      Impression    IMPRESSION: No lytic or sclerotic lesions are evident. No fractures  are evident. Degenerative changes throughout the spine. Surgical clips  in the pelvis. Internal fixation of the right patella.    GETACHEW LÓPEZ MD         SYSTEM ID:  [id number]          [facility] PS: 0   ASSESSMENT AND RECOMMENDATIONS     Impression:  Doing well from an MGUS standpoint with no change in M protein level (0.5 g/dL) on most recent SPEP. Creatinine and calcium, and hemoglobin are all normal. No evidence of progression. Pt is clinically doing well overall. Next visit will order light chains, quantitative immunoglobulins.    Plan:  -observe; repeat labs in 3 and 6 months with visit in 6  months    Return to Clinic:   6 months             Again, thank you for allowing me to participate in the care of your patient.      Sincerely,    Dilan Lamb MD

## 2022-08-12 NOTE — PROGRESS NOTES
[facility]: Cancer Care                                                                                          Briefly spoke with pt to re-introduce self.  Pt did not have time at present to talk at present.  Author to try calling pt after 2:30 on Monday.  Pt also reported her computer has been having issues and may not work for visit today.  Told her Dr. Dilan Lamb would try to call her if computer not working.    Signature:  Martha West RN, OCN

## 2022-08-17 ENCOUNTER — PATIENT OUTREACH (OUTPATIENT)
Dept: CARE COORDINATION | Facility: CLINIC | Age: 67
End: 2022-08-17

## 2022-08-17 NOTE — PROGRESS NOTES
Oncology Distress Screening Follow-up  Clinical Social Work  [facility]    Identified Concern and Score From Distress Screenin. How concerned are you about your ability to eat?  0       2. How concerned are you about unintended weight loss or your current weight?  0       3. How concerned are you about feeling depressed or very sad?  8 Abnormal        4. How concerned are you about feeling anxious or very scared?  8 Abnormal        5. Do you struggle with the loss of meaning and lesli in your life?  Quite a bit       6. How concerned are you about work and home life issues that may be affected by your cancer?  6       7. How concerned are you about knowing what resources are available to help you?  0       8. Do you currently have what you would describe as Amish or spiritual struggles?             Not at all                 Date of Distress Screenin22      Data: Eliana was seen in virtual visit for follow up MGUS.      Intervention/Education Provided:: Darian GRUBER called and spoke to Eliana, introducing self and reason for call. Eliana was appreciative of ALLYN check in, but states she is out to lunch with a friend. Eliana requested that ALLYN call back tomorrow afternoon, ALLYN agreed.       Follow-up Required: ALLYN will make another attempt to reach Eliana tomorrow PM.           DOMINIQUE Conley, [facility]  Clinical , Adult Oncology  Phone: [phone]

## 2022-08-18 ENCOUNTER — PATIENT OUTREACH (OUTPATIENT)
Dept: CARE COORDINATION | Facility: CLINIC | Age: 67
End: 2022-08-18

## 2022-08-18 NOTE — PROGRESS NOTES
Social Work Note: Telephone Call  Oncology Clinic    Data/Intervention:  Patient Name:  Eliana Jaen  /Age:  1955 (66 year old)    Call From:  ALLYN  Reason for Call:  Distress screen follow up    Assessment:  ALLYN called and spoke to Eliana this afternoon, as planned. She was out shopping with her granddaughter, but was open to discussion. Eliana acknowledged that her distress screen was elevated and she endorses that things at home are a challenge. Eliana's  is living at home with late sate Parkinson's and Dementia. Eliana reports that they have been told he has about a year left. They did have him in facility previously, but felt that it was very expensive and didn't meet the standards they wanted for his care, particular during Covid times as staffing in these places struggled so much. She has a PCA coming to the house M-F from  which is a big help.     Also in the home is Eliana's daughter (40) and granddaughter (11). They moved in after her daughter went through a divorce and her daughter was in nursing school and working, but seems to be having mental health issues. Eliana states she is often angry and will yell at her. She has called the police in the past and [facility], but daughter will calm down or refuse help. She has been told that she could make her daughter leave the house if she doesn't seek help, but Eliana is not comfortable or interested in that. She understands that she can't force her daughter to seek help if she isn't willing. ALLYN spoke to Eliana about her own support services and perhaps she could seek counseling through this process. Eliana agreed that was her plan. She is getting new insurance as of  and has a list of providers in that network. She plans to contact them next week.     Eliana thanked ALLYN for checking in. She feels that despite the challenges she is doing as well as she can. Not seeking additional resources. ALLYN encouraged  Eliana to reach out to her care team if she needs additional SW support.     Plan:  This patient is not an oncology patient, and will therefore not be followed in ongoing way by oncology social work. Eliana knows to reach out to providers as needed in future if he is in need of additional psychosocial services/support.      DOMINIQUE Conley, [facility]  Clinical , Adult Oncology  Phone: [phone]

## 2022-08-30 ENCOUNTER — PATIENT OUTREACH (OUTPATIENT)
Dept: ONCOLOGY | Facility: CLINIC | Age: 67
End: 2022-08-30

## 2022-08-30 NOTE — PROGRESS NOTES
[facility]: Cancer Care                                                                                          Briefly spoke with pt this afternoon.  When explained wanted to complete learning assessment at this time, pt reported she has bronchitis and that her  is upstairs with pneumonia so now was not a good time to talk.  She requested author call back another day.  RN agreed to plan but also told pt, looked like one of our schedulers had called to schedule pt's return visit with Dr. Dilan Lamb.  Reminded her to call and schedule return in near future as he schedules out months in advance.    Signature:  Martha West, RUDY, OCN

## 2022-11-10 DIAGNOSIS — Z94.0 KIDNEY TRANSPLANTED: Primary | ICD-10-CM

## 2022-11-10 DIAGNOSIS — E78.5 HYPERLIPIDEMIA: ICD-10-CM

## 2022-11-10 RX ORDER — SIMVASTATIN 20 MG
20 TABLET ORAL EVERY MORNING
Qty: 90 TABLET | Refills: 3 | Status: SHIPPED | OUTPATIENT
Start: 2022-11-10 | End: 2024-03-12

## 2022-11-11 RX ORDER — PREDNISONE 5 MG/1
5 TABLET ORAL DAILY
Qty: 30 TABLET | Refills: 11 | Status: SHIPPED | OUTPATIENT
Start: 2022-11-11 | End: 2023-04-10

## 2022-11-28 DIAGNOSIS — Z94.0 KIDNEY TRANSPLANTED: Primary | ICD-10-CM

## 2022-11-28 RX ORDER — MYCOPHENOLATE MOFETIL 250 MG/1
500 CAPSULE ORAL 2 TIMES DAILY
Qty: 360 CAPSULE | Refills: 3 | Status: SHIPPED | OUTPATIENT
Start: 2022-11-28 | End: 2024-05-21

## 2022-12-21 ENCOUNTER — TELEPHONE (OUTPATIENT)
Dept: NEPHROLOGY | Facility: CLINIC | Age: 67
End: 2022-12-21

## 2022-12-22 ENCOUNTER — VIRTUAL VISIT (OUTPATIENT)
Dept: NEPHROLOGY | Facility: CLINIC | Age: 67
End: 2022-12-22
Attending: INTERNAL MEDICINE
Payer: COMMERCIAL

## 2022-12-22 VITALS — WEIGHT: 175 LBS | BODY MASS INDEX: 29.12 KG/M2

## 2022-12-22 DIAGNOSIS — D84.9 IMMUNOSUPPRESSION (H): ICD-10-CM

## 2022-12-22 DIAGNOSIS — Z94.0 KIDNEY REPLACED BY TRANSPLANT: Primary | ICD-10-CM

## 2022-12-22 DIAGNOSIS — E55.9 VITAMIN D DEFICIENCY: ICD-10-CM

## 2022-12-22 DIAGNOSIS — Z48.298 AFTERCARE FOLLOWING ORGAN TRANSPLANT: ICD-10-CM

## 2022-12-22 DIAGNOSIS — N18.31 STAGE 3A CHRONIC KIDNEY DISEASE (H): ICD-10-CM

## 2022-12-22 PROCEDURE — 99214 OFFICE O/P EST MOD 30 MIN: CPT | Mod: 95 | Performed by: INTERNAL MEDICINE

## 2022-12-22 ASSESSMENT — PAIN SCALES - GENERAL: PAINLEVEL: NO PAIN (0)

## 2022-12-22 NOTE — LETTER
12/22/2022       RE: Eliana Jane  [address]  [place] [address]     Dear Colleague,    Thank you for referring your patient, Eliana Jane, to the [facility] [place] [facility]. Please see a copy of my visit note below.    Eliana is a 67 year old who is being evaluated via a billable telephone visit.    What phone number would you like to be contacted at? [phone]     How would you like to obtain your AVS? Mail a copy  Distant Location (provider location):  Off-site  Phone call duration: 22 minutes      TRANSPLANT NEPHROLOGY CHRONIC POST TRANSPLANT VISIT    Assessment & Plan   # LDKT: Stable   - Baseline Creatinine:  ~ 1.1-1.3   - Albuminuria: Normal (<18 mg/g Cr)   - Date DSA Last Checked: Aug/2008      Latest DSA: Not checked recently due to time from transplant   - BK Viremia: Not checked recently due to time from transplant   - Kidney Tx Biopsy: Oct 19, 2007; Result: Borderline acute cellular-mediated rejection.  Minimal interstitial fibrosis and tubular atrophy.                                              Sep 20, 2007; Result: Borderline acute cellular-mediated rejection.                                              Sep 06, 2007; Result: Acute cellular-mediated rejection, [place] 1B.                                              Aug 24, 2007; Result: No diagnostic evidence of acute rejection.  Focal acute tubular injury.    # Immunosuppression: Tacrolimus immediate release (goal 4-6), Mycophenolate mofetil (dose 500 mg every 12 hours) and Prednisone (dose 5 mg daily)   - Continue with intensive monitoring of immunosuppression for efficacy and toxicity.   - Changes: No    # Infection Prophylaxis:   Last CD4 Level: Not checked  - PJP: None    # Hypertension: Not checked recently;  Goal BP: < 130/80   - Changes: Not at this time; Recommend patient check blood pressure at home on a regular basis, such  as once every week or two, and follow up with PCP if above goal.    # Diabetes: Borderline control (HbA1c 7-9%) Last HbA1c: 8.0%   - Management as per primary care.    # Mineral Bone Disorder:   - Vitamin D; level: Not checked recently        On supplement: No  - Calcium; level: Normal        On supplement: No    # Chest Pain: Unclear etiology, but not classical for angina with very mild symptoms, only at rest.  Likely related to patient's stress.  However, she does have several cardiac risk factors.   - Recommend patient follow up with PCP for this.    # GERD: Asymptomatic on PPI.    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  Stable monoclonal peak at ~ 0.5.  Followed by Hematology.    # Skin Cancer: New lesions: none   - Discussed sun protection and recommend regular follow up with Dermatology.    # Medical Compliance: No.  Evidence of labs less than recommended.  - Discussed importance of checking labs regularly as recommended, taking medications as prescribed and attending scheduled medical appointments.    # COVID-19 Virus Review: Discussed COVID-19 virus and the potential medical risks.  Reviewed preventative health recommendations, including wearing a mask where appropriate.  Recommended COVID vaccination should be up to date with either an initial vaccination or booster shot when appropriate.  Asked the patient to inform the transplant center if they are exposed or diagnosed with this virus.    # COVID Vaccination Up To Date: No, due for next dose    # Transplant History:  Etiology of Kidney Failure: Unknown etiology  Tx: LDKT  Transplant: 8/16/2007 (Kidney)  Significant changes in immunosuppression: None  Significant transplant-related complications: Acute cellular-mediated rejection    Transplant Office Phone Number: [phone]    Assessment and plan was discussed with the patient and she voiced her understanding and agreement.    Return visit: Return in about 1 year (around  "12/22/2023).    Henrique Morrow MD    Chief Complaint   Ms. Earl Jane is a 67 year old here for kidney transplant and immunosuppression management.    History of Present Illness    Ms. Earl Jane reports feeling okay overall with some medical complaints.  Since last clinic visit, patient reports no hospitalizations or new medical complaints and has been doing well overall.  Patient's biggest issue is the failing health of her .  Her  has Parkinson's disease and dementia, now in hospice.  The patient and her daughter share time caring for him, which is a significant stressor, but they do have a PCA that comes in from 8 am to 4 pm to help.  Patient is still working through all of this, but she plans to retire at the end of March.    Her energy level is okay, although she is very tired at the end of the day.  She is active and does get a little exercise, mostly with walking.  Denies any specific chest pain or shortness of breath with exertion, although does report episodes of chest pain, usually at the end of the day.  Patient says when she lies down at night, she has some chest tightness at times and describes it as a \"little pressure.\"  This chest pain doesn't happened during the day or with exertion.  She just says she feels stressed and exhausted.  The pain is mild, doesn't really hurt, just slightly noticeable.  No associated shortness of breath, nausea, sweating or radiation of pain.  Patient reports she goes to sleep and wakes up without any symptoms.  Nothing seems to make the pain better or worse.  The episodes only come on occasionally and did not have it the last couple of nights.  She associates the episodes with increased stress and is going to follow up with her PCP for this.  No palpitations.  No leg swelling.    Appetite is good and she is trying to eat healthier.  She has lost ~ 20 lbs doing this.  No nausea or vomiting.  Occasional loose stools.  No heartburn symptoms on " omeprazole.  No fever, sweats or chills.  No night sweats.    Home BP: Not checked    Problem List   Patient Active Problem List   Diagnosis     Aftercare following organ transplant     Kidney replaced by transplant     Reactive airway disease     Gout     Dyslipidemia     Diabetes mellitus, type 2 (H)     Chronic kidney disease, stage 3 (H)     GERD (gastroesophageal reflux disease)     Vitamin D deficiency     Immunosuppression (H)       Allergies   Allergies   Allergen Reactions     Dust Mites Unknown     Trees      asthma       Medications   Current Outpatient Medications   Medication Sig     Albuterol (VENTOLIN IN) Inhale 1 Inhaler into the lungs daily as needed.     CELLCEPT (BRAND) 250 MG capsule Take 2 capsules (500 mg) by mouth 2 times daily GENERIC     citalopram (CELEXA) 20 MG tablet Take 2 tablets (40 mg) by mouth daily     empagliflozin (JARDIANCE) 10 MG TABS tablet Take 1 tablet (10 mg) by mouth daily     fluticasone (VERAMYST) 27.5 MCG/SPRAY spray Spray 2 sprays into both nostrils daily     Fluticasone-Salmeterol (ADVAIR DISKUS IN) Inhale 2 Inhalers into the lungs daily as needed.     glimepiride (AMARYL) 1 MG tablet Take 3 tablets (3 mg) by mouth every morning (before breakfast)     levothyroxine (SYNTHROID/LEVOTHROID) 50 MCG tablet Take 1 tablet (50 mcg) by mouth daily     omeprazole (PRILOSEC) 20 MG CR capsule Take 1 capsule (20 mg) by mouth daily     predniSONE (DELTASONE) 5 MG tablet Take 1 tablet (5 mg) by mouth daily     simvastatin (ZOCOR) 20 MG tablet Take 1 tablet (20 mg) by mouth every morning     tacrolimus (GENERIC EQUIVALENT) 1 MG capsule Take 2 capsules (2 mg) by mouth 2 times daily     mometasone (NASONEX) 50 MCG/ACT nasal spray Spray 2 sprays into both nostrils daily Reported on 4/7/2017 (Patient not taking: Reported on 12/22/2022)     mometasone-formoterol (DULERA) 200-5 MCG/ACT inhaler Inhale 1 puff into the lungs 2 times daily Reported on 4/7/2017 (Patient not taking: Reported on  12/22/2022)     mycophenolate (GENERIC EQUIVALENT) 250 MG capsule Take 2 capsules (500 mg) by mouth 2 times daily (Patient not taking: Reported on 12/22/2022)     PROGRAF (BRAND) 0.5 MG capsule HOLD (Patient not taking: Reported on 12/22/2022)     PROGRAF (BRAND) 1 MG capsule Take 2 capsules (2 mg) by mouth 2 times daily Total dose = 2 mg twice a day (Patient not taking: Reported on 12/22/2022)     No current facility-administered medications for this visit.     There are no discontinued medications.    Physical Exam   Vital Signs: Wt 79.4 kg (175 lb)   BMI 29.12 kg/m      Physical exam was deferred for this telemedicine visit.    Data     Renal Latest Ref Rng & Units 8/9/2022 3/1/2022 2/10/2022   Na 133 - 144 mmol/L 136 138 136   Na (external) 135 - 145 mmol/L - - -   K 3.4 - 5.3 mmol/L 4.7 4.4 4.5   K (external) 3.5 - 5.0 mmol/L - - -   Cl 94 - 109 mmol/L 109 108 108   CO2 20 - 32 mmol/L 25 27 24   CO2 (external) 21 - 31 mmol/L - - -   BUN 7 - 30 mg/dL 20 15 15   BUN (external) 8 - 25 mg/dL - - -   Cr 0.52 - 1.04 mg/dL 1.16(H) 1.08(H) 0.97   Cr (external) 0.57 - 1.11 mg/dL - - -   Glucose 70 - 99 mg/dL 199(H) 143(H) 216(H)   Glucose (external) 65 - 100 mg/dL - - -   Ca  8.5 - 10.1 mg/dL 9.3 10.0 9.7   Ca (external) 8.5 - 10.5 mg/dL - - -   Mg 1.6 - 2.3 mg/dL - - -   Mg (external) 1.6 - 2.6 mg/dL - - -     Bone Health Latest Ref Rng & Units 2/9/2021 1/25/2021 11/5/2010   Phos 2.5 - 4.5 mg/dL - - 2.9   PTHi 18 - 80 pg/mL 111(H) - -   Vit D Def 20 - 75 ug/L - 7(L) -     Heme Latest Ref Rng & Units 8/9/2022 2/10/2022 2/4/2022   WBC 4.0 - 11.0 10e3/uL 5.7 5.5 5.2   WBC (external) 4.5 - 11.0 thou/cu mm - - -   Hgb 11.7 - 15.7 g/dL 13.4 13.4 12.8   Hgb (external) 12.0 - 16.0 g/dL - - -   Plt 150 - 450 10e3/uL 243 256 222   Plt (external) 140 - 440 thou/cu mm - - -   ABSOLUTE NEUTROPHIL 1.6 - 8.3 10e9/L - - -   ABSOLUTE NEUTROPHILS (EXTERNAL) 1.7 - 7.0 thou/cu mm - - -   ABSOLUTE LYMPHOCYTES 0.8 - 5.3 10e9/L - - -    ABSOLUTE LYMPHOCYTES (EXTERNAL) 0.9 - 2.9 thou/cu mm - - -   ABSOLUTE MONOCYTES 0.0 - 1.3 10e9/L - - -   ABSOLUTE MONOCYTES (EXTERNAL) <0.9 thou/cu mm - - -   ABSOLUTE EOSINOPHILS 0.0 - 0.7 10e9/L - - -   ABSOLUTE EOSINOPHILS (EXTERNAL) <0.5 thou/cu mm - - -   ABSOLUTE BASOPHILS 0.0 - 0.2 10e9/L - - -   ABSOLUTE BASOPHILS (EXTERNAL) <0.3 thou/cu mm - - -     Liver Latest Ref Rng & Units 8/9/2022 2/4/2022 9/20/2021   AP 40 - 150 U/L 110 114 -   AP (external) 50 - 136 U/L - - -   TBili 0.2 - 1.3 mg/dL 0.6 0.4 -   TBili (external) 0.2 - 1.2 mg/dL - - -   ALT 0 - 50 U/L 23 24 -   ALT (external) 8 - 45 U/L - - -   AST 0 - 45 U/L 12 9 -   AST (external) 2 - 40 U/L - - -   Tot Protein 6.8 - 8.8 g/dL 7.0 6.8 -   Tot Protein (external) 6.0 - 8.0 g/dL - - -   Albumin 3.4 - 5.0 g/dL 3.5 3.4 3.6   Albumin (external) 3.2 - 4.6 g/dL - - -     Pancreas Latest Ref Rng & Units 7/16/2019 12/24/2014 8/29/2007   A1C 0 - 5.6 % 8.6(H) - -   A1C (external) <=6.4 - 7.1(H) -   Amylase 30 - 110 U/L - - 57   Lipase 20 - 250 U/L - - 54     Iron studies Latest Ref Rng & Units 10/8/2007 8/28/2007   Iron 35 - 180 ug/dL 155 39   Ferritin 10 - 300 ng/mL 662(H) 287     [facility] Txp Virology Latest Ref Rng & Units 8/18/2009 7/10/2009 6/13/2008   CMV IgG EU/mL - - -   CVM DNA Quant - - - -   CMV Quant <100 Copies/mL - - -   CMV QT Log <2.0 Log copies/mL - - -   BK Spec - Plasma, EDTA anticoagulant Plasma, EDTA anticoagulant Plasma, EDTA anticoagulant   BK Res <1000 copies/mL <1000 <1000 <1000   BK Log <3.0 Log copies/mL <3.0 <3.0 <3.0   EBV IgG - - - -   Hep B Core NEG - - -   Hep B Surf 0.0 - 4.9 mIU/mL - - -   HIV 1&2 NEG - - -        Recent Labs   Lab Test 03/01/22  1133 04/16/22  1028 08/09/22  0821   DOSTAC 2/28/2022 4/15/2022 8/8/2022   TACROL 16.0* 3.4* 5.2             Again, thank you for allowing me to participate in the care of your patient.      Sincerely,    Henrique Morrow MD

## 2022-12-22 NOTE — LETTER
Date:January 18, 2023      Patient was self referred, no letter generated. Do not send.        [facility] Information

## 2022-12-22 NOTE — LETTER
12/22/2022      RE: Eliana Elliott Birchem  [address]  [place] [address]       Eliana is a 67 year old who is being evaluated via a billable telephone visit.    What phone number would you like to be contacted at? [phone]     How would you like to obtain your AVS? Mail a copy  Distant Location (provider location):  Off-site  Phone call duration: 22 minutes      TRANSPLANT NEPHROLOGY CHRONIC POST TRANSPLANT VISIT    Assessment & Plan   # LDKT: Stable   - Baseline Creatinine:  ~ 1.1-1.3   - Albuminuria: Normal (<18 mg/g Cr)   - Date DSA Last Checked: Aug/2008      Latest DSA: Not checked recently due to time from transplant   - BK Viremia: Not checked recently due to time from transplant   - Kidney Tx Biopsy: Oct 19, 2007; Result: Borderline acute cellular-mediated rejection.  Minimal interstitial fibrosis and tubular atrophy.                                              Sep 20, 2007; Result: Borderline acute cellular-mediated rejection.                                              Sep 06, 2007; Result: Acute cellular-mediated rejection, Banff 1B.                                              Aug 24, 2007; Result: No diagnostic evidence of acute rejection.  Focal acute tubular injury.    # Immunosuppression: Tacrolimus immediate release (goal 4-6), Mycophenolate mofetil (dose 500 mg every 12 hours) and Prednisone (dose 5 mg daily)   - Continue with intensive monitoring of immunosuppression for efficacy and toxicity.   - Changes: No    # Infection Prophylaxis:   Last CD4 Level: Not checked  - PJP: None    # Hypertension: Not checked recently;  Goal BP: < 130/80   - Changes: Not at this time; Recommend patient check blood pressure at home on a regular basis, such as once every week or two, and follow up with PCP if above goal.    # Diabetes: Borderline control (HbA1c 7-9%) Last HbA1c: 8.0%   - Management as per primary care.    # Mineral Bone Disorder:   - Vitamin D; level: Not checked recently         On supplement: No  - Calcium; level: Normal        On supplement: No    # Chest Pain: Unclear etiology, but not classical for angina with very mild symptoms, only at rest.  Likely related to patient's stress.  However, she does have several cardiac risk factors.   - Recommend patient follow up with PCP for this.    # GERD: Asymptomatic on PPI.    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  Stable monoclonal peak at ~ 0.5.  Followed by Hematology.    # Skin Cancer: New lesions: none   - Discussed sun protection and recommend regular follow up with Dermatology.    # Medical Compliance: No.  Evidence of labs less than recommended.  - Discussed importance of checking labs regularly as recommended, taking medications as prescribed and attending scheduled medical appointments.    # COVID-19 Virus Review: Discussed COVID-19 virus and the potential medical risks.  Reviewed preventative health recommendations, including wearing a mask where appropriate.  Recommended COVID vaccination should be up to date with either an initial vaccination or booster shot when appropriate.  Asked the patient to inform the transplant center if they are exposed or diagnosed with this virus.    # COVID Vaccination Up To Date: No, due for next dose    # Transplant History:  Etiology of Kidney Failure: Unknown etiology  Tx: LDKT  Transplant: 8/16/2007 (Kidney)  Significant changes in immunosuppression: None  Significant transplant-related complications: Acute cellular-mediated rejection    Transplant Office Phone Number: [phone]    Assessment and plan was discussed with the patient and she voiced her understanding and agreement.    Return visit: Return in about 1 year (around 12/22/2023).    Henrique Morrow MD    Chief Complaint   Ms. Earl Jane is a 67 year old here for kidney transplant and immunosuppression management.    History of Present Illness    Ms. Earl Jane reports feeling okay overall with some  "medical complaints.  Since last clinic visit, patient reports no hospitalizations or new medical complaints and has been doing well overall.  Patient's biggest issue is the failing health of her .  Her  has Parkinson's disease and dementia, now in hospice.  The patient and her daughter share time caring for him, which is a significant stressor, but they do have a PCA that comes in from 8 am to 4 pm to help.  Patient is still working through all of this, but she plans to retire at the end of March.    Her energy level is okay, although she is very tired at the end of the day.  She is active and does get a little exercise, mostly with walking.  Denies any specific chest pain or shortness of breath with exertion, although does report episodes of chest pain, usually at the end of the day.  Patient says when she lies down at night, she has some chest tightness at times and describes it as a \"little pressure.\"  This chest pain doesn't happened during the day or with exertion.  She just says she feels stressed and exhausted.  The pain is mild, doesn't really hurt, just slightly noticeable.  No associated shortness of breath, nausea, sweating or radiation of pain.  Patient reports she goes to sleep and wakes up without any symptoms.  Nothing seems to make the pain better or worse.  The episodes only come on occasionally and did not have it the last couple of nights.  She associates the episodes with increased stress and is going to follow up with her PCP for this.  No palpitations.  No leg swelling.    Appetite is good and she is trying to eat healthier.  She has lost ~ 20 lbs doing this.  No nausea or vomiting.  Occasional loose stools.  No heartburn symptoms on omeprazole.  No fever, sweats or chills.  No night sweats.    Home BP: Not checked    Problem List   Patient Active Problem List   Diagnosis     Aftercare following organ transplant     Kidney replaced by transplant     Reactive airway disease     " Gout     Dyslipidemia     Diabetes mellitus, type 2 (H)     Chronic kidney disease, stage 3 (H)     GERD (gastroesophageal reflux disease)     Vitamin D deficiency     Immunosuppression (H)       Allergies   Allergies   Allergen Reactions     Dust Mites Unknown     Trees      asthma       Medications   Current Outpatient Medications   Medication Sig     Albuterol (VENTOLIN IN) Inhale 1 Inhaler into the lungs daily as needed.     CELLCEPT (BRAND) 250 MG capsule Take 2 capsules (500 mg) by mouth 2 times daily GENERIC     citalopram (CELEXA) 20 MG tablet Take 2 tablets (40 mg) by mouth daily     empagliflozin (JARDIANCE) 10 MG TABS tablet Take 1 tablet (10 mg) by mouth daily     fluticasone (VERAMYST) 27.5 MCG/SPRAY spray Spray 2 sprays into both nostrils daily     Fluticasone-Salmeterol (ADVAIR DISKUS IN) Inhale 2 Inhalers into the lungs daily as needed.     glimepiride (AMARYL) 1 MG tablet Take 3 tablets (3 mg) by mouth every morning (before breakfast)     levothyroxine (SYNTHROID/LEVOTHROID) 50 MCG tablet Take 1 tablet (50 mcg) by mouth daily     omeprazole (PRILOSEC) 20 MG CR capsule Take 1 capsule (20 mg) by mouth daily     predniSONE (DELTASONE) 5 MG tablet Take 1 tablet (5 mg) by mouth daily     simvastatin (ZOCOR) 20 MG tablet Take 1 tablet (20 mg) by mouth every morning     tacrolimus (GENERIC EQUIVALENT) 1 MG capsule Take 2 capsules (2 mg) by mouth 2 times daily     mometasone (NASONEX) 50 MCG/ACT nasal spray Spray 2 sprays into both nostrils daily Reported on 4/7/2017 (Patient not taking: Reported on 12/22/2022)     mometasone-formoterol (DULERA) 200-5 MCG/ACT inhaler Inhale 1 puff into the lungs 2 times daily Reported on 4/7/2017 (Patient not taking: Reported on 12/22/2022)     mycophenolate (GENERIC EQUIVALENT) 250 MG capsule Take 2 capsules (500 mg) by mouth 2 times daily (Patient not taking: Reported on 12/22/2022)     PROGRAF (BRAND) 0.5 MG capsule HOLD (Patient not taking: Reported on 12/22/2022)      PROGRAF (BRAND) 1 MG capsule Take 2 capsules (2 mg) by mouth 2 times daily Total dose = 2 mg twice a day (Patient not taking: Reported on 12/22/2022)     No current facility-administered medications for this visit.     There are no discontinued medications.    Physical Exam   Vital Signs: Wt 79.4 kg (175 lb)   BMI 29.12 kg/m      Physical exam was deferred for this telemedicine visit.    Data     Renal Latest Ref Rng & Units 8/9/2022 3/1/2022 2/10/2022   Na 133 - 144 mmol/L 136 138 136   Na (external) 135 - 145 mmol/L - - -   K 3.4 - 5.3 mmol/L 4.7 4.4 4.5   K (external) 3.5 - 5.0 mmol/L - - -   Cl 94 - 109 mmol/L 109 108 108   CO2 20 - 32 mmol/L 25 27 24   CO2 (external) 21 - 31 mmol/L - - -   BUN 7 - 30 mg/dL 20 15 15   BUN (external) 8 - 25 mg/dL - - -   Cr 0.52 - 1.04 mg/dL 1.16(H) 1.08(H) 0.97   Cr (external) 0.57 - 1.11 mg/dL - - -   Glucose 70 - 99 mg/dL 199(H) 143(H) 216(H)   Glucose (external) 65 - 100 mg/dL - - -   Ca  8.5 - 10.1 mg/dL 9.3 10.0 9.7   Ca (external) 8.5 - 10.5 mg/dL - - -   Mg 1.6 - 2.3 mg/dL - - -   Mg (external) 1.6 - 2.6 mg/dL - - -     Bone Health Latest Ref Rng & Units 2/9/2021 1/25/2021 11/5/2010   Phos 2.5 - 4.5 mg/dL - - 2.9   PTHi 18 - 80 pg/mL 111(H) - -   Vit D Def 20 - 75 ug/L - 7(L) -     Heme Latest Ref Rng & Units 8/9/2022 2/10/2022 2/4/2022   WBC 4.0 - 11.0 10e3/uL 5.7 5.5 5.2   WBC (external) 4.5 - 11.0 thou/cu mm - - -   Hgb 11.7 - 15.7 g/dL 13.4 13.4 12.8   Hgb (external) 12.0 - 16.0 g/dL - - -   Plt 150 - 450 10e3/uL 243 256 222   Plt (external) 140 - 440 thou/cu mm - - -   ABSOLUTE NEUTROPHIL 1.6 - 8.3 10e9/L - - -   ABSOLUTE NEUTROPHILS (EXTERNAL) 1.7 - 7.0 thou/cu mm - - -   ABSOLUTE LYMPHOCYTES 0.8 - 5.3 10e9/L - - -   ABSOLUTE LYMPHOCYTES (EXTERNAL) 0.9 - 2.9 thou/cu mm - - -   ABSOLUTE MONOCYTES 0.0 - 1.3 10e9/L - - -   ABSOLUTE MONOCYTES (EXTERNAL) <0.9 thou/cu mm - - -   ABSOLUTE EOSINOPHILS 0.0 - 0.7 10e9/L - - -   ABSOLUTE EOSINOPHILS (EXTERNAL) <0.5 thou/cu mm  - - -   ABSOLUTE BASOPHILS 0.0 - 0.2 10e9/L - - -   ABSOLUTE BASOPHILS (EXTERNAL) <0.3 thou/cu mm - - -     Liver Latest Ref Rng & Units 8/9/2022 2/4/2022 9/20/2021   AP 40 - 150 U/L 110 114 -   AP (external) 50 - 136 U/L - - -   TBili 0.2 - 1.3 mg/dL 0.6 0.4 -   TBili (external) 0.2 - 1.2 mg/dL - - -   ALT 0 - 50 U/L 23 24 -   ALT (external) 8 - 45 U/L - - -   AST 0 - 45 U/L 12 9 -   AST (external) 2 - 40 U/L - - -   Tot Protein 6.8 - 8.8 g/dL 7.0 6.8 -   Tot Protein (external) 6.0 - 8.0 g/dL - - -   Albumin 3.4 - 5.0 g/dL 3.5 3.4 3.6   Albumin (external) 3.2 - 4.6 g/dL - - -     Pancreas Latest Ref Rng & Units 7/16/2019 12/24/2014 8/29/2007   A1C 0 - 5.6 % 8.6(H) - -   A1C (external) <=6.4 - 7.1(H) -   Amylase 30 - 110 U/L - - 57   Lipase 20 - 250 U/L - - 54     Iron studies Latest Ref Rng & Units 10/8/2007 8/28/2007   Iron 35 - 180 ug/dL 155 39   Ferritin 10 - 300 ng/mL 662(H) 287     [facility] Txp Virology Latest Ref Rng & Units 8/18/2009 7/10/2009 6/13/2008   CMV IgG EU/mL - - -   CVM DNA Quant - - - -   CMV Quant <100 Copies/mL - - -   CMV QT Log <2.0 Log copies/mL - - -   BK Spec - Plasma, EDTA anticoagulant Plasma, EDTA anticoagulant Plasma, EDTA anticoagulant   BK Res <1000 copies/mL <1000 <1000 <1000   BK Log <3.0 Log copies/mL <3.0 <3.0 <3.0   EBV IgG - - - -   Hep B Core NEG - - -   Hep B Surf 0.0 - 4.9 mIU/mL - - -   HIV 1&2 NEG - - -        Recent Labs   Lab Test 03/01/22  1133 04/16/22  1028 08/09/22  0821   DOSTAC 2/28/2022 4/15/2022 8/8/2022   TACROL 16.0* 3.4* 5.2             Henrique Morrow MD

## 2022-12-22 NOTE — PROGRESS NOTES
Eliana is a 67 year old who is being evaluated via a billable telephone visit.    What phone number would you like to be contacted at? [phone]     How would you like to obtain your AVS? Mail a copy  Distant Location (provider location):  Off-site  Phone call duration: 22 minutes      TRANSPLANT NEPHROLOGY CHRONIC POST TRANSPLANT VISIT    Assessment & Plan   # LDKT: Stable   - Baseline Creatinine:  ~ 1.1-1.3   - Albuminuria: Normal (<18 mg/g Cr)   - Date DSA Last Checked: Aug/2008      Latest DSA: Not checked recently due to time from transplant   - BK Viremia: Not checked recently due to time from transplant   - Kidney Tx Biopsy: Oct 19, 2007; Result: Borderline acute cellular-mediated rejection.  Minimal interstitial fibrosis and tubular atrophy.                                              Sep 20, 2007; Result: Borderline acute cellular-mediated rejection.                                              Sep 06, 2007; Result: Acute cellular-mediated rejection, Ban[place] 1B.                                              Aug 24, 2007; Result: No diagnostic evidence of acute rejection.  Focal acute tubular injury.    # Immunosuppression: Tacrolimus immediate release (goal 4-6), Mycophenolate mofetil (dose 500 mg every 12 hours) and Prednisone (dose 5 mg daily)   - Continue with intensive monitoring of immunosuppression for efficacy and toxicity.   - Changes: No    # Infection Prophylaxis:   Last CD4 Level: Not checked  - PJP: None    # Hypertension: Not checked recently;  Goal BP: < 130/80   - Changes: Not at this time; Recommend patient check blood pressure at home on a regular basis, such as once every week or two, and follow up with PCP if above goal.    # Diabetes: Borderline control (HbA1c 7-9%) Last HbA1c: 8.0%   - Management as per primary care.    # Mineral Bone Disorder:   - Vitamin D; level: Not checked recently        On supplement: No  - Calcium; level: Normal        On supplement: No    # Chest Pain: Unclear  etiology, but not classical for angina with very mild symptoms, only at rest.  Likely related to patient's stress.  However, she does have several cardiac risk factors.   - Recommend patient follow up with PCP for this.    # GERD: Asymptomatic on PPI.    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  Stable monoclonal peak at ~ 0.5.  Followed by Hematology.    # Skin Cancer: New lesions: none   - Discussed sun protection and recommend regular follow up with Dermatology.    # Medical Compliance: No.  Evidence of labs less than recommended.  - Discussed importance of checking labs regularly as recommended, taking medications as prescribed and attending scheduled medical appointments.    # COVID-19 Virus Review: Discussed COVID-19 virus and the potential medical risks.  Reviewed preventative health recommendations, including wearing a mask where appropriate.  Recommended COVID vaccination should be up to date with either an initial vaccination or booster shot when appropriate.  Asked the patient to inform the transplant center if they are exposed or diagnosed with this virus.    # COVID Vaccination Up To Date: No, due for next dose    # Transplant History:  Etiology of Kidney Failure: Unknown etiology  Tx: LDKT  Transplant: 8/16/2007 (Kidney)  Significant changes in immunosuppression: None  Significant transplant-related complications: Acute cellular-mediated rejection    Transplant Office Phone Number: [phone]    Assessment and plan was discussed with the patient and she voiced her understanding and agreement.    Return visit: Return in about 1 year (around 12/22/2023).    Henrique Morrow MD    Chief Complaint   Ms. Earl Jane is a 67 year old here for kidney transplant and immunosuppression management.    History of Present Illness    Ms. Earl Jane reports feeling okay overall with some medical complaints.  Since last clinic visit, patient reports no hospitalizations or new medical  "complaints and has been doing well overall.  Patient's biggest issue is the failing health of her .  Her  has Parkinson's disease and dementia, now in hospice.  The patient and her daughter share time caring for him, which is a significant stressor, but they do have a PCA that comes in from 8 am to 4 pm to help.  Patient is still working through all of this, but she plans to retire at the end of March.    Her energy level is okay, although she is very tired at the end of the day.  She is active and does get a little exercise, mostly with walking.  Denies any specific chest pain or shortness of breath with exertion, although does report episodes of chest pain, usually at the end of the day.  Patient says when she lies down at night, she has some chest tightness at times and describes it as a \"little pressure.\"  This chest pain doesn't happened during the day or with exertion.  She just says she feels stressed and exhausted.  The pain is mild, doesn't really hurt, just slightly noticeable.  No associated shortness of breath, nausea, sweating or radiation of pain.  Patient reports she goes to sleep and wakes up without any symptoms.  Nothing seems to make the pain better or worse.  The episodes only come on occasionally and did not have it the last couple of nights.  She associates the episodes with increased stress and is going to follow up with her PCP for this.  No palpitations.  No leg swelling.    Appetite is good and she is trying to eat healthier.  She has lost ~ 20 lbs doing this.  No nausea or vomiting.  Occasional loose stools.  No heartburn symptoms on omeprazole.  No fever, sweats or chills.  No night sweats.    Home BP: Not checked    Problem List   Patient Active Problem List   Diagnosis     Aftercare following organ transplant     Kidney replaced by transplant     Reactive airway disease     Gout     Dyslipidemia     Diabetes mellitus, type 2 (H)     Chronic kidney disease, stage 3 (H)     " GERD (gastroesophageal reflux disease)     Vitamin D deficiency     Immunosuppression (H)       Allergies   Allergies   Allergen Reactions     Dust Mites Unknown     Trees      asthma       Medications   Current Outpatient Medications   Medication Sig     Albuterol (VENTOLIN IN) Inhale 1 Inhaler into the lungs daily as needed.     CELLCEPT (BRAND) 250 MG capsule Take 2 capsules (500 mg) by mouth 2 times daily GENERIC     citalopram (CELEXA) 20 MG tablet Take 2 tablets (40 mg) by mouth daily     empagliflozin (JARDIANCE) 10 MG TABS tablet Take 1 tablet (10 mg) by mouth daily     fluticasone (VERAMYST) 27.5 MCG/SPRAY spray Spray 2 sprays into both nostrils daily     Fluticasone-Salmeterol (ADVAIR DISKUS IN) Inhale 2 Inhalers into the lungs daily as needed.     glimepiride (AMARYL) 1 MG tablet Take 3 tablets (3 mg) by mouth every morning (before breakfast)     levothyroxine (SYNTHROID/LEVOTHROID) 50 MCG tablet Take 1 tablet (50 mcg) by mouth daily     omeprazole (PRILOSEC) 20 MG CR capsule Take 1 capsule (20 mg) by mouth daily     predniSONE (DELTASONE) 5 MG tablet Take 1 tablet (5 mg) by mouth daily     simvastatin (ZOCOR) 20 MG tablet Take 1 tablet (20 mg) by mouth every morning     tacrolimus (GENERIC EQUIVALENT) 1 MG capsule Take 2 capsules (2 mg) by mouth 2 times daily     mometasone (NASONEX) 50 MCG/ACT nasal spray Spray 2 sprays into both nostrils daily Reported on 4/7/2017 (Patient not taking: Reported on 12/22/2022)     mometasone-formoterol (DULERA) 200-5 MCG/ACT inhaler Inhale 1 puff into the lungs 2 times daily Reported on 4/7/2017 (Patient not taking: Reported on 12/22/2022)     mycophenolate (GENERIC EQUIVALENT) 250 MG capsule Take 2 capsules (500 mg) by mouth 2 times daily (Patient not taking: Reported on 12/22/2022)     PROGRAF (BRAND) 0.5 MG capsule HOLD (Patient not taking: Reported on 12/22/2022)     PROGRAF (BRAND) 1 MG capsule Take 2 capsules (2 mg) by mouth 2 times daily Total dose = 2 mg twice a  day (Patient not taking: Reported on 12/22/2022)     No current facility-administered medications for this visit.     There are no discontinued medications.    Physical Exam   Vital Signs: Wt 79.4 kg (175 lb)   BMI 29.12 kg/m      Physical exam was deferred for this telemedicine visit.    Data     Renal Latest Ref Rng & Units 8/9/2022 3/1/2022 2/10/2022   Na 133 - 144 mmol/L 136 138 136   Na (external) 135 - 145 mmol/L - - -   K 3.4 - 5.3 mmol/L 4.7 4.4 4.5   K (external) 3.5 - 5.0 mmol/L - - -   Cl 94 - 109 mmol/L 109 108 108   CO2 20 - 32 mmol/L 25 27 24   CO2 (external) 21 - 31 mmol/L - - -   BUN 7 - 30 mg/dL 20 15 15   BUN (external) 8 - 25 mg/dL - - -   Cr 0.52 - 1.04 mg/dL 1.16(H) 1.08(H) 0.97   Cr (external) 0.57 - 1.11 mg/dL - - -   Glucose 70 - 99 mg/dL 199(H) 143(H) 216(H)   Glucose (external) 65 - 100 mg/dL - - -   Ca  8.5 - 10.1 mg/dL 9.3 10.0 9.7   Ca (external) 8.5 - 10.5 mg/dL - - -   Mg 1.6 - 2.3 mg/dL - - -   Mg (external) 1.6 - 2.6 mg/dL - - -     Bone Health Latest Ref Rng & Units 2/9/2021 1/25/2021 11/5/2010   Phos 2.5 - 4.5 mg/dL - - 2.9   PTHi 18 - 80 pg/mL 111(H) - -   Vit D Def 20 - 75 ug/L - 7(L) -     Heme Latest Ref Rng & Units 8/9/2022 2/10/2022 2/4/2022   WBC 4.0 - 11.0 10e3/uL 5.7 5.5 5.2   WBC (external) 4.5 - 11.0 thou/cu mm - - -   Hgb 11.7 - 15.7 g/dL 13.4 13.4 12.8   Hgb (external) 12.0 - 16.0 g/dL - - -   Plt 150 - 450 10e3/uL 243 256 222   Plt (external) 140 - 440 thou/cu mm - - -   ABSOLUTE NEUTROPHIL 1.6 - 8.3 10e9/L - - -   ABSOLUTE NEUTROPHILS (EXTERNAL) 1.7 - 7.0 thou/cu mm - - -   ABSOLUTE LYMPHOCYTES 0.8 - 5.3 10e9/L - - -   ABSOLUTE LYMPHOCYTES (EXTERNAL) 0.9 - 2.9 thou/cu mm - - -   ABSOLUTE MONOCYTES 0.0 - 1.3 10e9/L - - -   ABSOLUTE MONOCYTES (EXTERNAL) <0.9 thou/cu mm - - -   ABSOLUTE EOSINOPHILS 0.0 - 0.7 10e9/L - - -   ABSOLUTE EOSINOPHILS (EXTERNAL) <0.5 thou/cu mm - - -   ABSOLUTE BASOPHILS 0.0 - 0.2 10e9/L - - -   ABSOLUTE BASOPHILS (EXTERNAL) <0.3 thou/cu mm -  - -     Liver Latest Ref Rng & Units 8/9/2022 2/4/2022 9/20/2021   AP 40 - 150 U/L 110 114 -   AP (external) 50 - 136 U/L - - -   TBili 0.2 - 1.3 mg/dL 0.6 0.4 -   TBili (external) 0.2 - 1.2 mg/dL - - -   ALT 0 - 50 U/L 23 24 -   ALT (external) 8 - 45 U/L - - -   AST 0 - 45 U/L 12 9 -   AST (external) 2 - 40 U/L - - -   Tot Protein 6.8 - 8.8 g/dL 7.0 6.8 -   Tot Protein (external) 6.0 - 8.0 g/dL - - -   Albumin 3.4 - 5.0 g/dL 3.5 3.4 3.6   Albumin (external) 3.2 - 4.6 g/dL - - -     Pancreas Latest Ref Rng & Units 7/16/2019 12/24/2014 8/29/2007   A1C 0 - 5.6 % 8.6(H) - -   A1C (external) <=6.4 - 7.1(H) -   Amylase 30 - 110 U/L - - 57   Lipase 20 - 250 U/L - - 54     Iron studies Latest Ref Rng & Units 10/8/2007 8/28/2007   Iron 35 - 180 ug/dL 155 39   Ferritin 10 - 300 ng/mL 662(H) 287     [facility] Txp Virology Latest Ref Rng & Units 8/18/2009 7/10/2009 6/13/2008   CMV IgG EU/mL - - -   CVM DNA Quant - - - -   CMV Quant <100 Copies/mL - - -   CMV QT Log <2.0 Log copies/mL - - -   BK Spec - Plasma, EDTA anticoagulant Plasma, EDTA anticoagulant Plasma, EDTA anticoagulant   BK Res <1000 copies/mL <1000 <1000 <1000   BK Log <3.0 Log copies/mL <3.0 <3.0 <3.0   EBV IgG - - - -   Hep B Core NEG - - -   Hep B Surf 0.0 - 4.9 mIU/mL - - -   HIV 1&2 NEG - - -        Recent Labs   Lab Test 03/01/22  1133 04/16/22  1028 08/09/22  0821   DOSTAC 2/28/2022 4/15/2022 8/8/2022   TACROL 16.0* 3.4* 5.2

## 2023-01-18 DIAGNOSIS — Z48.298 AFTERCARE FOLLOWING ORGAN TRANSPLANT: Primary | ICD-10-CM

## 2023-01-18 DIAGNOSIS — E55.9 VITAMIN D DEFICIENCY: ICD-10-CM

## 2023-01-18 NOTE — PATIENT INSTRUCTIONS
Patient Recommendations:  - Please follow up with PCP for chest pressure episodes, as discussed.  - Recommend obtaining an updated COVID vaccination, the bivalent version.     Transplant Patient Information  Your Post Transplant Coordinator is: Anjali Ryder  For non urgent items, we encourage you to contact your coordinator/care team online via [employer]  You and your care team can also contact your transplant coordinator Monday - Friday, 8am - 5pm at [phone] (Option 2 to reach the coordinator or Option 4 to schedule an appointment).  After hours for urgent matters, please call [facility] at [phone].

## 2023-02-16 ENCOUNTER — LAB (OUTPATIENT)
Dept: LAB | Facility: CLINIC | Age: 68
End: 2023-02-16
Payer: COMMERCIAL

## 2023-02-16 DIAGNOSIS — Z79.899 ENCOUNTER FOR LONG-TERM CURRENT USE OF MEDICATION: ICD-10-CM

## 2023-02-16 DIAGNOSIS — Z48.298 AFTERCARE FOLLOWING ORGAN TRANSPLANT: ICD-10-CM

## 2023-02-16 DIAGNOSIS — E55.9 VITAMIN D DEFICIENCY: ICD-10-CM

## 2023-02-16 DIAGNOSIS — D47.2 MGUS (MONOCLONAL GAMMOPATHY OF UNKNOWN SIGNIFICANCE): ICD-10-CM

## 2023-02-16 DIAGNOSIS — Z94.0 KIDNEY REPLACED BY TRANSPLANT: ICD-10-CM

## 2023-02-16 LAB
ALBUMIN MFR UR ELPH: 21 MG/DL (ref 1–14)
ALBUMIN SERPL-MCNC: 3.5 G/DL (ref 3.4–5)
ALP SERPL-CCNC: 114 U/L (ref 40–150)
ALT SERPL W P-5'-P-CCNC: 18 U/L (ref 0–50)
ANION GAP SERPL CALCULATED.3IONS-SCNC: 6 MMOL/L (ref 3–14)
AST SERPL W P-5'-P-CCNC: 9 U/L (ref 0–45)
BASOPHILS # BLD AUTO: 0 10E3/UL (ref 0–0.2)
BASOPHILS NFR BLD AUTO: 0 %
BILIRUB SERPL-MCNC: 0.9 MG/DL (ref 0.2–1.3)
BUN SERPL-MCNC: 17 MG/DL (ref 7–30)
CALCIUM SERPL-MCNC: 9.8 MG/DL (ref 8.5–10.1)
CHLORIDE BLD-SCNC: 108 MMOL/L (ref 94–109)
CO2 SERPL-SCNC: 24 MMOL/L (ref 20–32)
CREAT SERPL-MCNC: 1.09 MG/DL (ref 0.52–1.04)
CREAT UR-MCNC: 156 MG/DL
EOSINOPHIL # BLD AUTO: 0.3 10E3/UL (ref 0–0.7)
EOSINOPHIL NFR BLD AUTO: 3 %
ERYTHROCYTE [DISTWIDTH] IN BLOOD BY AUTOMATED COUNT: 12.2 % (ref 10–15)
GFR SERPL CREATININE-BSD FRML MDRD: 55 ML/MIN/1.73M2
GLUCOSE BLD-MCNC: 119 MG/DL (ref 70–99)
HCT VFR BLD AUTO: 38.7 % (ref 35–47)
HGB BLD-MCNC: 12.8 G/DL (ref 11.7–15.7)
LYMPHOCYTES # BLD AUTO: 1.7 10E3/UL (ref 0.8–5.3)
LYMPHOCYTES NFR BLD AUTO: 22 %
MCH RBC QN AUTO: 34.1 PG (ref 26.5–33)
MCHC RBC AUTO-ENTMCNC: 33.1 G/DL (ref 31.5–36.5)
MCV RBC AUTO: 103 FL (ref 78–100)
MONOCYTES # BLD AUTO: 0.6 10E3/UL (ref 0–1.3)
MONOCYTES NFR BLD AUTO: 8 %
NEUTROPHILS # BLD AUTO: 5.3 10E3/UL (ref 1.6–8.3)
NEUTROPHILS NFR BLD AUTO: 67 %
PLATELET # BLD AUTO: 214 10E3/UL (ref 150–450)
POTASSIUM BLD-SCNC: 4 MMOL/L (ref 3.4–5.3)
PROT SERPL-MCNC: 7.1 G/DL (ref 6.8–8.8)
PROT/CREAT 24H UR: 0.13 MG/MG CR (ref 0–0.2)
RBC # BLD AUTO: 3.75 10E6/UL (ref 3.8–5.2)
SODIUM SERPL-SCNC: 138 MMOL/L (ref 133–144)
TACROLIMUS BLD-MCNC: 6.6 UG/L (ref 5–15)
TME LAST DOSE: NORMAL H
TME LAST DOSE: NORMAL H
TOTAL PROTEIN SERUM FOR ELP: 6.5 G/DL (ref 6.4–8.3)
WBC # BLD AUTO: 7.9 10E3/UL (ref 4–11)

## 2023-02-16 PROCEDURE — 84165 PROTEIN E-PHORESIS SERUM: CPT

## 2023-02-16 PROCEDURE — 80053 COMPREHEN METABOLIC PANEL: CPT

## 2023-02-16 PROCEDURE — 83521 IG LIGHT CHAINS FREE EACH: CPT

## 2023-02-16 PROCEDURE — 84156 ASSAY OF PROTEIN URINE: CPT

## 2023-02-16 PROCEDURE — 82306 VITAMIN D 25 HYDROXY: CPT

## 2023-02-16 PROCEDURE — 82784 ASSAY IGA/IGD/IGG/IGM EACH: CPT | Mod: 59

## 2023-02-16 PROCEDURE — 84155 ASSAY OF PROTEIN SERUM: CPT | Mod: 59

## 2023-02-16 PROCEDURE — 85025 COMPLETE CBC W/AUTO DIFF WBC: CPT

## 2023-02-16 PROCEDURE — 80197 ASSAY OF TACROLIMUS: CPT

## 2023-02-16 PROCEDURE — 82784 ASSAY IGA/IGD/IGG/IGM EACH: CPT

## 2023-02-16 PROCEDURE — 36415 COLL VENOUS BLD VENIPUNCTURE: CPT

## 2023-02-17 ENCOUNTER — TELEPHONE (OUTPATIENT)
Dept: TRANSPLANT | Facility: CLINIC | Age: 68
End: 2023-02-17
Payer: COMMERCIAL

## 2023-02-17 DIAGNOSIS — E55.9 VITAMIN D DEFICIENCY: Primary | ICD-10-CM

## 2023-02-17 DIAGNOSIS — Z48.298 AFTERCARE FOLLOWING ORGAN TRANSPLANT: ICD-10-CM

## 2023-02-17 LAB
ALBUMIN SERPL ELPH-MCNC: 3.8 G/DL (ref 3.7–5.1)
ALPHA1 GLOB SERPL ELPH-MCNC: 0.4 G/DL (ref 0.2–0.4)
ALPHA2 GLOB SERPL ELPH-MCNC: 0.8 G/DL (ref 0.5–0.9)
B-GLOBULIN SERPL ELPH-MCNC: 0.6 G/DL (ref 0.6–1)
DEPRECATED CALCIDIOL+CALCIFEROL SERPL-MC: 6 UG/L (ref 20–75)
GAMMA GLOB SERPL ELPH-MCNC: 0.9 G/DL (ref 0.7–1.6)
IGA SERPL-MCNC: 166 MG/DL (ref 84–499)
IGG SERPL-MCNC: 976 MG/DL (ref 610–1616)
IGM SERPL-MCNC: 69 MG/DL (ref 35–242)
KAPPA LC FREE SER-MCNC: 3.45 MG/DL (ref 0.33–1.94)
KAPPA LC FREE/LAMBDA FREE SER NEPH: 1.9 {RATIO} (ref 0.26–1.65)
LAMBDA LC FREE SERPL-MCNC: 1.82 MG/DL (ref 0.57–2.63)
M PROTEIN SERPL ELPH-MCNC: 0.6 G/DL
PROT PATTERN SERPL ELPH-IMP: ABNORMAL

## 2023-02-17 NOTE — TELEPHONE ENCOUNTER
Pt returned call that she has taken a home test, positive for COVID. She has contacted  C/o sore throat, sneezing, yellow nasal drainage, denies fever.  Symptoms began 2/13.    PCP prescribed molnupirivir.       RNCC asked about mycophenolate dose in order to possibly lower her dose during COVID, but she reports she has been taking half of her prescribed dose for several months - reports she must have been confused. Pt has been taking 500 mg MMF daily.     RNCC added on MPA level to labs drawn yesterday to address.     Pt v/u of Vit D prescription added. RNCC went over OTC meds ok to take and when to proceed to ED.

## 2023-02-17 NOTE — TELEPHONE ENCOUNTER
Patient Call: General  Route to LPN    Reason for call: called in regards of touch base and had some questions. Call back number is [phone].     Call back needed? Yes    Return Call Needed  Same as documented in contacts section  When to return call?: Same day: Route High Priority

## 2023-02-17 NOTE — TELEPHONE ENCOUNTER
ISSUE: vit D level 6    PLAN: Henrique Morrow MD Reilly, Megan, RN  Vitamin D deficiency and recommend starting cholecalciferol 25 mcg daily and will watch for hypercalcemia.     OUTCOME: RNCC left detailed message with info on Vitamin D level and prescription. Also briefly went over the rest of her lab results as was requested in her phone call to us.     CB requested with f/up questions.     RX ordered to [facility] [place]  Will monitor Ca+ at next labs.

## 2023-02-24 ENCOUNTER — VIRTUAL VISIT (OUTPATIENT)
Dept: ONCOLOGY | Facility: CLINIC | Age: 68
End: 2023-02-24
Attending: INTERNAL MEDICINE
Payer: COMMERCIAL

## 2023-02-24 DIAGNOSIS — D47.2 MGUS (MONOCLONAL GAMMOPATHY OF UNKNOWN SIGNIFICANCE): Primary | ICD-10-CM

## 2023-02-24 DIAGNOSIS — E55.9 VITAMIN D DEFICIENCY: ICD-10-CM

## 2023-02-24 PROCEDURE — 99213 OFFICE O/P EST LOW 20 MIN: CPT | Mod: VID | Performed by: INTERNAL MEDICINE

## 2023-02-24 PROCEDURE — G0463 HOSPITAL OUTPT CLINIC VISIT: HCPCS | Mod: PN,GT | Performed by: INTERNAL MEDICINE

## 2023-02-24 ASSESSMENT — ENCOUNTER SYMPTOMS
COUGH: 0
SINUS PAIN: 0
FREQUENCY: 0
CLAUDICATION: 0
ABDOMINAL PAIN: 0
NERVOUS/ANXIOUS: 0
EYE DISCHARGE: 0
SENSORY CHANGE: 0
SHORTNESS OF BREATH: 0
HEMOPTYSIS: 0
PALPITATIONS: 0
CHILLS: 0
WHEEZING: 0
VOMITING: 0
HALLUCINATIONS: 0
SPEECH CHANGE: 0
FEVER: 0
FALLS: 1
WEIGHT LOSS: 0
FOCAL WEAKNESS: 0
HEARTBURN: 0
SPUTUM PRODUCTION: 0
DEPRESSION: 0
EYE REDNESS: 0
PHOTOPHOBIA: 0
EYE PAIN: 0
SEIZURES: 0
BLURRED VISION: 0
NAUSEA: 0
DOUBLE VISION: 0
PND: 0
BACK PAIN: 0
POLYDIPSIA: 0
WEAKNESS: 0
MEMORY LOSS: 0
DIZZINESS: 0
TREMORS: 0
MYALGIAS: 0
INSOMNIA: 0
CONSTIPATION: 0
NECK PAIN: 0
DIARRHEA: 0
SORE THROAT: 0
TINGLING: 0
LOSS OF CONSCIOUSNESS: 0
HEMATURIA: 0
STRIDOR: 0
HEADACHES: 0
BLOOD IN STOOL: 0
DIAPHORESIS: 0
DYSURIA: 0
BRUISES/BLEEDS EASILY: 0
ORTHOPNEA: 0
FLANK PAIN: 0

## 2023-02-24 ASSESSMENT — LIFESTYLE VARIABLES: SUBSTANCE_ABUSE: 0

## 2023-02-24 NOTE — PROGRESS NOTES
Video-Visit Details    Type of service:  Video Visit    Video Start Time (time video started): 1:54 pm    Video End Time (time video stopped): 2:10 pm    Originating Location (pt. Location): Work      Distant Location (provider location):  On-site    Mode of Communication:  Video Conference via [facility] PHYSICIANS  HEMATOLOGY AND MEDICAL ONCOLOGY    FOLLOW-UP VIRTUAL PATIENT VISIT BY VIDEO    PATIENT NAME: Eliana Jane   MRN# [id number]     Date of Visit: 2023    Referring Provider: Referred Self, MD  No address on file YOB: 1955     Reason for visit: follow-up    CHIEF COMPLAINT   Oncology Clinic Visit (Follow up - lab review and just recovered from Covid)       HISTORY OF PRESENTING ILLNESS     66 yo woman presented for evaluation of monoclonal protein detected on recent SPEP obtained because of a low anion gap. S/P renal transplant 15 years ago on tacrolimus.     Bone survey: 21: negative for lytic lesions    INTERVAL HISTORY:       after long illness with Parkinson's and dementia. Had a right leg and left shoulder injury when  was being attended to by paramedics. Had COVID recently and is still fatigued. Recovered relatively well from COVID but has some ear popping sensation. No other new issues or infections.       PAST MEDICAL HISTORY     Past Medical History:   Diagnosis Date     Diabetes (H)      Hyperlipidemia      Hypothyroidism      MGUS (monoclonal gammopathy of unknown significance)      Renal transplant recipient 2007    sister is donor        PAST SURGICAL HISTORY     Past Surgical History:   Procedure Laterality Date     KNEE SURGERY Right 2015     KNEE SURGERY       NEPHRECTOMY TRANSPLANTED ORGAN           CURRENT OUTPATIENT MEDICATIONS     Current Outpatient Medications   Medication Sig     Albuterol (VENTOLIN IN) Inhale 1 Inhaler into the lungs daily as needed.     CELLCEPT (BRAND) 250 MG  capsule Take 2 capsules (500 mg) by mouth 2 times daily GENERIC     cholecalciferol (VITAMIN D3) 25 mcg (1000 units) capsule Take 1 capsule (25 mcg) by mouth daily     citalopram (CELEXA) 20 MG tablet Take 2 tablets (40 mg) by mouth daily     empagliflozin (JARDIANCE) 10 MG TABS tablet Take 1 tablet (10 mg) by mouth daily     fluticasone (VERAMYST) 27.5 MCG/SPRAY spray Spray 2 sprays into both nostrils daily     Fluticasone-Salmeterol (ADVAIR DISKUS IN) Inhale 2 Inhalers into the lungs daily as needed.     glimepiride (AMARYL) 1 MG tablet Take 3 tablets (3 mg) by mouth every morning (before breakfast)     levothyroxine (SYNTHROID/LEVOTHROID) 50 MCG tablet Take 1 tablet (50 mcg) by mouth daily     mometasone (NASONEX) 50 MCG/ACT nasal spray Spray 2 sprays into both nostrils daily Reported on 4/7/2017 (Patient not taking: Reported on 12/22/2022)     mometasone-formoterol (DULERA) 200-5 MCG/ACT inhaler Inhale 1 puff into the lungs 2 times daily Reported on 4/7/2017 (Patient not taking: Reported on 12/22/2022)     mycophenolate (GENERIC EQUIVALENT) 250 MG capsule Take 2 capsules (500 mg) by mouth 2 times daily (Patient not taking: Reported on 12/22/2022)     omeprazole (PRILOSEC) 20 MG CR capsule Take 1 capsule (20 mg) by mouth daily     predniSONE (DELTASONE) 5 MG tablet Take 1 tablet (5 mg) by mouth daily     PROGRAF (BRAND) 0.5 MG capsule HOLD (Patient not taking: Reported on 12/22/2022)     PROGRAF (BRAND) 1 MG capsule Take 2 capsules (2 mg) by mouth 2 times daily Total dose = 2 mg twice a day (Patient not taking: Reported on 12/22/2022)     simvastatin (ZOCOR) 20 MG tablet Take 1 tablet (20 mg) by mouth every morning     tacrolimus (GENERIC EQUIVALENT) 1 MG capsule Take 2 capsules (2 mg) by mouth 2 times daily     No current facility-administered medications for this visit.        ALLERGIES     Allergies   Allergen Reactions     Dust Mites Unknown     Trees      asthma     .     SOCIAL HISTORY     Social History      Socioeconomic History     Marital status:      Spouse name: Not on file     Number of children: Not on file     Years of education: Not on file     Highest education level: Not on file   Occupational History     Not on file   Tobacco Use     Smoking status: Never     Smokeless tobacco: Never   Substance and Sexual Activity     Alcohol use: Not on file     Drug use: Not on file     Sexual activity: Not on file   Other Topics Concern     Parent/sibling w/ CABG, MI or angioplasty before 65F 55M? Not Asked   Social History Narrative     Not on file     Social Determinants of Health     Financial Resource Strain: Not on file   Food Insecurity: Not on file   Transportation Needs: Not on file   Physical Activity: Not on file   Stress: Not on file   Social Connections: Not on file   Intimate Partner Violence: Not on file   Housing Stability: Not on file          FAMILY HISTORY     Family History   Problem Relation Age of Onset     Lung Cancer Father      Thyroid Disease Father      Hypothyroidism Mother      Heart Disease Mother      Heart Disease Father      Prostate Cancer Father      Cerebrovascular Disease Paternal Grandfather      Breast Cancer Maternal Grandmother      Hyperlipidemia Mother      Hyperlipidemia Father           REVIEW OF SYSTEMS   Review of Systems   Constitutional: Negative for chills, diaphoresis, fever, malaise/fatigue and weight loss.   HENT: Positive for congestion. Negative for ear discharge, ear pain, hearing loss, nosebleeds, sinus pain, sore throat and tinnitus.    Eyes: Negative for blurred vision, double vision, photophobia, pain, discharge and redness.   Respiratory: Negative for cough, hemoptysis, sputum production, shortness of breath, wheezing and stridor.    Cardiovascular: Negative for chest pain, palpitations, orthopnea, claudication, leg swelling and PND.   Gastrointestinal: Negative for abdominal pain, blood in stool, constipation, diarrhea, heartburn, melena, nausea and  vomiting.   Genitourinary: Negative for dysuria, flank pain, frequency, hematuria and urgency.   Musculoskeletal: Positive for falls (due to ice) and joint pain. Negative for back pain, myalgias and neck pain.   Skin: Negative for itching and rash.   Neurological: Negative for dizziness, tingling, tremors, sensory change, speech change, focal weakness, seizures, loss of consciousness, weakness and headaches.   Endo/Heme/Allergies: Negative for environmental allergies and polydipsia. Does not bruise/bleed easily.   Psychiatric/Behavioral: Negative for depression, hallucinations, memory loss, substance abuse and suicidal ideas. The patient is not nervous/anxious and does not have insomnia.         Situational depression with loss of           PHYSICAL EXAM   Because of the ongoing COVID-19 public health crisis, the patient was seen via video. The patient appeared well and in no acute distress. Facial appearance was normal with intact cranial nerves, normal speech, no visible scleral icterus. EOMI. Neck ROM was normal with no masses seen. Affect was normal and appropriate.       LABORATORY AND IMAGING STUDIES     Recent Labs   Lab Test 02/16/23  0746 08/09/22  0821 02/10/22  0822 02/04/22  1516   WBC 7.9 5.7 5.5 5.2   RBC 3.75* 3.88 3.97 3.79*   HGB 12.8 13.4 13.4 12.8   HCT 38.7 38.9 40.5 37.8   * 100 102* 100   MCH 34.1* 34.5* 33.8* 33.8*   MCHC 33.1 34.4 33.1 33.9   RDW 12.2 11.5 11.8 11.9    243 256 222   NEUTROPHIL 67 58  --  50     Recent Labs   Lab Test 02/16/23  0746 08/09/22  0821 03/01/22  1133    136 138   POTASSIUM 4.0 4.7 4.4   CHLORIDE 108 109 108   CO2 24 25 27   ANIONGAP 6 2* 3   * 199* 143*   BUN 17 20 15   CR 1.09* 1.16* 1.08*   CURTIS 9.8 9.3 10.0     Recent Labs   Lab Test 02/16/23  0746 08/09/22  0821 02/04/22  1516   BILITOTAL 0.9 0.6 0.4   ALKPHOS 114 110 114   AST 9 12 9   ALT 18 23 24     No results for input(s): LDH in the last 71258 hours.  Recent Labs   Lab  Test 02/16/23  0746      IGM 69        Recent Labs   Lab Test 02/16/23  0746   ELPM 0.6*   ELPINT Two monoclonal proteins (0.6 and 0.1 g/dL) seen in the gamma fraction. Previously characterized in our laboratory on 10/13/2021 as a monoclonal IgG immunoglobulin of lambda light chain type and IgA immunoglobulin of kappa light chain type. Pathologic significance requires clinical correlation. Radha Anderson M.D., Ph.D.     Recent Labs   Lab Test 02/10/22  0820 10/13/21  1445   UELPI Trace amount of albumin and a few trace globulin bands seen. No obvious monoclonal protein seen. Pathological significance requires clinical correlation.  We normally recommend a first morning voided urine to detect clinically significant proteinuria. The specific gravity of this specimen was only 1.005. Pathological significance requires clinical correlation.  CLAY Johnson M.D., Ph.D., Pathologist ()  --    UIEP  --  Monoclonal IgG immunoglobulin of kappa light chain type. Pathological significance requires clinical correlation.  Francisco Wells M.D., Ph.D.         Results for orders placed or performed in visit on 11/04/21   XR Bone Survey Complete    Narrative    XR BONE SURVEY COMPLETE 11/4/2021 4:51 PM     HISTORY: MGUS (monoclonal gammopathy of unknown significance)    COMPARISON: None.      Impression    IMPRESSION: No lytic or sclerotic lesions are evident. No fractures  are evident. Degenerative changes throughout the spine. Surgical clips  in the pelvis. Internal fixation of the right patella.    GETACHEW LÓPEZ MD         SYSTEM ID:  [id number]          [facility] PS: 0   ASSESSMENT AND RECOMMENDATIONS     Impression:  69 yo woman with a history of renal transplant 15+ years ago and a finding of MGUS for routine monitoring. Serum light chain assay shows a very small but stable kappa free light chain and an only slightly out-of-range K/L ratio of 1.90 vs 1.83 1 year ago. CBC shows no anemia as well as  normal platelets and WBC. SPEP shows two small spikes, 0.6 and a 0.1 g/dL, previously characterized as IgG lambda and IgA kappa proteins. IgM, IgA, and IgG quantitative assays are normal, however. Of note, Vit D is low at 6 (nl 20-75).    Plan:  --Vit D supplementation (400 international unit(s) capsules x5 (2000 international unit(s)) daily for a week, then 3 (1200 international unit(s)) until end of March, then 400 international unit(s) daily thereafter  --Nasal decongestant topical like Afrin for nasal side with ear popping sensation; use twice daily for 2 days then taper to daily x 2 then off  --Observe    Return to Clinic:  6 months      Dilan Lamb MD   of Medicine  Division of Hematology, Oncology and Transplantation  [facility]

## 2023-02-24 NOTE — LETTER
2023         RE: Eliana Jane  [address]  [place] [address]        Dear Colleague,    Thank you for referring your patient, Eliana Jane, to the [facility]. Please see a copy of my visit note below.    Video-Visit Details    Type of service:  Video Visit    Video Start Time (time video started): 1:54 pm    Video End Time (time video stopped): 2:10 pm    Originating Location (pt. Location): Work      Distant Location (provider location):  On-site    Mode of Communication:  Video Conference via [facility] PHYSICIANS  HEMATOLOGY AND MEDICAL ONCOLOGY    FOLLOW-UP VIRTUAL PATIENT VISIT BY VIDEO    PATIENT NAME: Eliana Jane   MRN# [id number]     Date of Visit: 2023    Referring Provider: Referred Self, MD  No address on file YOB: 1955     Reason for visit: follow-up    CHIEF COMPLAINT   Oncology Clinic Visit (Follow up - lab review and just recovered from Covid)       HISTORY OF PRESENTING ILLNESS     68 yo woman presented for evaluation of monoclonal protein detected on recent SPEP obtained because of a low anion gap. S/P renal transplant 15 years ago on tacrolimus.     Bone survey: 21: negative for lytic lesions    INTERVAL HISTORY:       after long illness with Parkinson's and dementia. Had a right leg and left shoulder injury when  was being attended to by paramedics. Had COVID recently and is still fatigued. Recovered relatively well from COVID but has some ear popping sensation. No other new issues or infections.       PAST MEDICAL HISTORY     Past Medical History:   Diagnosis Date     Diabetes (H)      Hyperlipidemia      Hypothyroidism      MGUS (monoclonal gammopathy of unknown significance)      Renal transplant recipient 2007    sister is donor        PAST SURGICAL HISTORY     Past Surgical History:   Procedure Laterality Date      KNEE SURGERY Right 1/23/2015     KNEE SURGERY       NEPHRECTOMY TRANSPLANTED ORGAN           CURRENT OUTPATIENT MEDICATIONS     Current Outpatient Medications   Medication Sig     Albuterol (VENTOLIN IN) Inhale 1 Inhaler into the lungs daily as needed.     CELLCEPT (BRAND) 250 MG capsule Take 2 capsules (500 mg) by mouth 2 times daily GENERIC     cholecalciferol (VITAMIN D3) 25 mcg (1000 units) capsule Take 1 capsule (25 mcg) by mouth daily     citalopram (CELEXA) 20 MG tablet Take 2 tablets (40 mg) by mouth daily     empagliflozin (JARDIANCE) 10 MG TABS tablet Take 1 tablet (10 mg) by mouth daily     fluticasone (VERAMYST) 27.5 MCG/SPRAY spray Spray 2 sprays into both nostrils daily     Fluticasone-Salmeterol (ADVAIR DISKUS IN) Inhale 2 Inhalers into the lungs daily as needed.     glimepiride (AMARYL) 1 MG tablet Take 3 tablets (3 mg) by mouth every morning (before breakfast)     levothyroxine (SYNTHROID/LEVOTHROID) 50 MCG tablet Take 1 tablet (50 mcg) by mouth daily     mometasone (NASONEX) 50 MCG/ACT nasal spray Spray 2 sprays into both nostrils daily Reported on 4/7/2017 (Patient not taking: Reported on 12/22/2022)     mometasone-formoterol (DULERA) 200-5 MCG/ACT inhaler Inhale 1 puff into the lungs 2 times daily Reported on 4/7/2017 (Patient not taking: Reported on 12/22/2022)     mycophenolate (GENERIC EQUIVALENT) 250 MG capsule Take 2 capsules (500 mg) by mouth 2 times daily (Patient not taking: Reported on 12/22/2022)     omeprazole (PRILOSEC) 20 MG CR capsule Take 1 capsule (20 mg) by mouth daily     predniSONE (DELTASONE) 5 MG tablet Take 1 tablet (5 mg) by mouth daily     PROGRAF (BRAND) 0.5 MG capsule HOLD (Patient not taking: Reported on 12/22/2022)     PROGRAF (BRAND) 1 MG capsule Take 2 capsules (2 mg) by mouth 2 times daily Total dose = 2 mg twice a day (Patient not taking: Reported on 12/22/2022)     simvastatin (ZOCOR) 20 MG tablet Take 1 tablet (20 mg) by mouth every morning     tacrolimus  (GENERIC EQUIVALENT) 1 MG capsule Take 2 capsules (2 mg) by mouth 2 times daily     No current facility-administered medications for this visit.        ALLERGIES     Allergies   Allergen Reactions     Dust Mites Unknown     Trees      asthma     .     SOCIAL HISTORY     Social History     Socioeconomic History     Marital status:      Spouse name: Not on file     Number of children: Not on file     Years of education: Not on file     Highest education level: Not on file   Occupational History     Not on file   Tobacco Use     Smoking status: Never     Smokeless tobacco: Never   Substance and Sexual Activity     Alcohol use: Not on file     Drug use: Not on file     Sexual activity: Not on file   Other Topics Concern     Parent/sibling w/ CABG, MI or angioplasty before 65F 55M? Not Asked   Social History Narrative     Not on file     Social Determinants of Health     Financial Resource Strain: Not on file   Food Insecurity: Not on file   Transportation Needs: Not on file   Physical Activity: Not on file   Stress: Not on file   Social Connections: Not on file   Intimate Partner Violence: Not on file   Housing Stability: Not on file          FAMILY HISTORY     Family History   Problem Relation Age of Onset     Lung Cancer Father      Thyroid Disease Father      Hypothyroidism Mother      Heart Disease Mother      Heart Disease Father      Prostate Cancer Father      Cerebrovascular Disease Paternal Grandfather      Breast Cancer Maternal Grandmother      Hyperlipidemia Mother      Hyperlipidemia Father           REVIEW OF SYSTEMS   Review of Systems   Constitutional: Negative for chills, diaphoresis, fever, malaise/fatigue and weight loss.   HENT: Positive for congestion. Negative for ear discharge, ear pain, hearing loss, nosebleeds, sinus pain, sore throat and tinnitus.    Eyes: Negative for blurred vision, double vision, photophobia, pain, discharge and redness.   Respiratory: Negative for cough, hemoptysis,  sputum production, shortness of breath, wheezing and stridor.    Cardiovascular: Negative for chest pain, palpitations, orthopnea, claudication, leg swelling and PND.   Gastrointestinal: Negative for abdominal pain, blood in stool, constipation, diarrhea, heartburn, melena, nausea and vomiting.   Genitourinary: Negative for dysuria, flank pain, frequency, hematuria and urgency.   Musculoskeletal: Positive for falls (due to ice) and joint pain. Negative for back pain, myalgias and neck pain.   Skin: Negative for itching and rash.   Neurological: Negative for dizziness, tingling, tremors, sensory change, speech change, focal weakness, seizures, loss of consciousness, weakness and headaches.   Endo/Heme/Allergies: Negative for environmental allergies and polydipsia. Does not bruise/bleed easily.   Psychiatric/Behavioral: Negative for depression, hallucinations, memory loss, substance abuse and suicidal ideas. The patient is not nervous/anxious and does not have insomnia.         Situational depression with loss of           PHYSICAL EXAM   Because of the ongoing COVID-19 public health crisis, the patient was seen via video. The patient appeared well and in no acute distress. Facial appearance was normal with intact cranial nerves, normal speech, no visible scleral icterus. EOMI. Neck ROM was normal with no masses seen. Affect was normal and appropriate.       LABORATORY AND IMAGING STUDIES     Recent Labs   Lab Test 02/16/23  0746 08/09/22  0821 02/10/22  0822 02/04/22  1516   WBC 7.9 5.7 5.5 5.2   RBC 3.75* 3.88 3.97 3.79*   HGB 12.8 13.4 13.4 12.8   HCT 38.7 38.9 40.5 37.8   * 100 102* 100   MCH 34.1* 34.5* 33.8* 33.8*   MCHC 33.1 34.4 33.1 33.9   RDW 12.2 11.5 11.8 11.9    243 256 222   NEUTROPHIL 67 58  --  50     Recent Labs   Lab Test 02/16/23  0746 08/09/22  0821 03/01/22  1133    136 138   POTASSIUM 4.0 4.7 4.4   CHLORIDE 108 109 108   CO2 24 25 27   ANIONGAP 6 2* 3   * 199*  143*   BUN 17 20 15   CR 1.09* 1.16* 1.08*   CURTIS 9.8 9.3 10.0     Recent Labs   Lab Test 02/16/23  0746 08/09/22  0821 02/04/22  1516   BILITOTAL 0.9 0.6 0.4   ALKPHOS 114 110 114   AST 9 12 9   ALT 18 23 24     No results for input(s): LDH in the last 69800 hours.  Recent Labs   Lab Test 02/16/23  0746      IGM 69        Recent Labs   Lab Test 02/16/23  0746   ELPM 0.6*   ELPINT Two monoclonal proteins (0.6 and 0.1 g/dL) seen in the gamma fraction. Previously characterized in our laboratory on 10/13/2021 as a monoclonal IgG immunoglobulin of lambda light chain type and IgA immunoglobulin of kappa light chain type. Pathologic significance requires clinical correlation. Radha Anderson M.D., Ph.D.     Recent Labs   Lab Test 02/10/22  0820 10/13/21  1445   UELPI Trace amount of albumin and a few trace globulin bands seen. No obvious monoclonal protein seen. Pathological significance requires clinical correlation.  We normally recommend a first morning voided urine to detect clinically significant proteinuria. The specific gravity of this specimen was only 1.005. Pathological significance requires clinical correlation.  CLAY Johnson M.D., Ph.D., Pathologist ()  --    UIEP  --  Monoclonal IgG immunoglobulin of kappa light chain type. Pathological significance requires clinical correlation.  Francisco Wells M.D., Ph.D.         Results for orders placed or performed in visit on 11/04/21   XR Bone Survey Complete    Narrative    XR BONE SURVEY COMPLETE 11/4/2021 4:51 PM     HISTORY: MGUS (monoclonal gammopathy of unknown significance)    COMPARISON: None.      Impression    IMPRESSION: No lytic or sclerotic lesions are evident. No fractures  are evident. Degenerative changes throughout the spine. Surgical clips  in the pelvis. Internal fixation of the right patella.    GETACHEW LÓPEZ MD         SYSTEM ID:  [id number]          [facility] PS: 0   ASSESSMENT AND RECOMMENDATIONS     Impression:  67 yo  woman with a history of renal transplant 15+ years ago and a finding of MGUS for routine monitoring. Serum light chain assay shows a very small but stable kappa free light chain and an only slightly out-of-range K/L ratio of 1.90 vs 1.83 1 year ago. CBC shows no anemia as well as normal platelets and WBC. SPEP shows two small spikes, 0.6 and a 0.1 g/dL, previously characterized as IgG lambda and IgA kappa proteins. IgM, IgA, and IgG quantitative assays are normal, however. Of note, Vit D is low at 6 (nl 20-75).    Plan:  --Vit D supplementation (400 international unit(s) capsules x5 (2000 international unit(s)) daily for a week, then 3 (1200 international unit(s)) until end of March, then 400 international unit(s) daily thereafter  --Nasal decongestant topical like Afrin for nasal side with ear popping sensation; use twice daily for 2 days then taper to daily x 2 then off  --Observe    Return to Clinic:  6 months      Dilan Lamb MD   of Medicine  Division of Hematology, Oncology and Transplantation  [facility]

## 2023-02-27 ENCOUNTER — PATIENT OUTREACH (OUTPATIENT)
Dept: ONCOLOGY | Facility: CLINIC | Age: 68
End: 2023-02-27
Payer: COMMERCIAL

## 2023-02-27 NOTE — PROGRESS NOTES
[facility]: Cancer Care                                                                                          Tried to reach out to patient today, updating chart (including learning assess) but her voice mail box is full.  Pt does not have MyChart.  Thinking she provides care for .  She did have virtual visit with Dr. Dilan Lamb on Friday, 2/24/23.  RN will try to reach out later in week.    Signature:  Martha West RN, OCN

## 2023-03-01 ENCOUNTER — PATIENT OUTREACH (OUTPATIENT)
Dept: ONCOLOGY | Facility: CLINIC | Age: 68
End: 2023-03-01
Payer: COMMERCIAL

## 2023-03-01 NOTE — PROGRESS NOTES
[facility]: Cancer Care                                                                                          Spoke with pt this afternoon and briefly introduced self.  Completed learning assessment with pt.    Signature:  Martha West RN, OCN

## 2023-04-10 ENCOUNTER — TELEPHONE (OUTPATIENT)
Dept: TRANSPLANT | Facility: CLINIC | Age: 68
End: 2023-04-10
Payer: COMMERCIAL

## 2023-04-10 DIAGNOSIS — Z94.0 KIDNEY TRANSPLANTED: ICD-10-CM

## 2023-04-10 RX ORDER — TACROLIMUS 1 MG/1
2 CAPSULE ORAL 2 TIMES DAILY
Qty: 360 CAPSULE | Refills: 3 | Status: SHIPPED | OUTPATIENT
Start: 2023-04-10 | End: 2023-08-31

## 2023-04-10 RX ORDER — PREDNISONE 5 MG/1
5 TABLET ORAL DAILY
Qty: 90 TABLET | Refills: 3 | Status: SHIPPED | OUTPATIENT
Start: 2023-04-10 | End: 2024-06-27

## 2023-04-10 RX ORDER — MYCOPHENOLATE MOFETIL 250 MG/1
500 CAPSULE ORAL 2 TIMES DAILY
Qty: 360 CAPSULE | Refills: 3 | Status: SHIPPED | OUTPATIENT
Start: 2023-04-10 | End: 2024-05-21

## 2023-04-10 NOTE — TELEPHONE ENCOUNTER
----- Message from Radha Ramirez [facility] sent at 4/10/2023 10:23 AM CDT -----  Regarding: Late to fill meds  Hi Anjali,    I called Eliana last Thursday and left a message.  She is 68 days late to fill her MMF (filled one month supply on 12/30/23).  I saw she was only taking 500mg daily, but she should have been out at the end of February.  She is 16 days late on prednisone and tacrolimus.      Radha

## 2023-04-10 NOTE — TELEPHONE ENCOUNTER
Called Eliana to follow up need for IMS medication refills. She was planning on reordering but has bronchitis and hasn't been feeling well. Confirmed she has been taking as prescribed.  Reviewed IMS medication doses and sent in refills.

## 2023-05-16 ENCOUNTER — TELEPHONE (OUTPATIENT)
Dept: NEPHROLOGY | Facility: CLINIC | Age: 68
End: 2023-05-16
Payer: COMMERCIAL

## 2023-05-16 NOTE — TELEPHONE ENCOUNTER
Left Voicemail (1st Attempt) for the patient to call back and schedule the following:    Appointment type: 1 year follow up  Provider: Dr Morrow  Return date: 12/2023  Specialty phone number: [phone]  Additional appointment(s) needed: lab  Additonal Notes: n/a

## 2023-06-27 ENCOUNTER — TELEPHONE (OUTPATIENT)
Dept: TRANSPLANT | Facility: CLINIC | Age: 68
End: 2023-06-27
Payer: COMMERCIAL

## 2023-06-27 NOTE — TELEPHONE ENCOUNTER
Please call Eliana,  She started a new job and they want her to get vaccinated for MMR and Varicella Zoster.  Eliana is wondering if these vaccinations are OK to get??

## 2023-06-27 NOTE — LETTER
June 28, 2023          Eliana,      Due to your transplant, you should not receive any live vaccines.         The following vaccines are recommended:     Diphtheria-pertussis-tetanus   Hemophilic influenza B   Hepatitis A (for travel or other risk)   Hepatitis B (receive before transplant)   Pneumovax (single booster at 5 years)   Inactivated polio   Influenza types A and B (booster every year)   Meningococcus (if at high risk)   Typhoid   Shingrix     Which vaccines should I avoid?   You should NOT receive the following  live  vaccines:   Varicella zoster   Bacillus Calmette-Sandrine (BCG)   Smallpox   Intranasal influenza   Live oral typhoid Ty21a and other newer vaccines   Measles (except during an outbreak)   Mumps   Rubella   Oral polio   Live Japanese B encephalitis vaccine   Yellow fever   Shingles (unless it is Shingrix)    Have a good day!      Kristyn Love  Post Kidney / Pancreas Transplant Coordinator  [facility]  T: [phone]

## 2023-06-28 ENCOUNTER — TELEPHONE (OUTPATIENT)
Dept: NEPHROLOGY | Facility: CLINIC | Age: 68
End: 2023-06-28
Payer: COMMERCIAL

## 2023-06-28 NOTE — TELEPHONE ENCOUNTER
Called back Eliana and discussed she should not get MMR and varicella vaccination.  Verbalized understanding. She will sign a waver.  [facility] mailed a copy of the list.

## 2023-07-05 ENCOUNTER — LAB (OUTPATIENT)
Dept: LAB | Facility: CLINIC | Age: 68
End: 2023-07-05
Payer: COMMERCIAL

## 2023-07-05 DIAGNOSIS — Z48.298 AFTERCARE FOLLOWING ORGAN TRANSPLANT: ICD-10-CM

## 2023-07-05 DIAGNOSIS — D47.2 MGUS (MONOCLONAL GAMMOPATHY OF UNKNOWN SIGNIFICANCE): ICD-10-CM

## 2023-07-05 LAB
ALBUMIN SERPL BCG-MCNC: 4.1 G/DL (ref 3.5–5.2)
ALP SERPL-CCNC: 134 U/L (ref 35–104)
ALT SERPL W P-5'-P-CCNC: 13 U/L (ref 0–50)
ANION GAP SERPL CALCULATED.3IONS-SCNC: 12 MMOL/L (ref 7–15)
AST SERPL W P-5'-P-CCNC: 18 U/L (ref 0–45)
BASOPHILS # BLD AUTO: 0 10E3/UL (ref 0–0.2)
BASOPHILS NFR BLD AUTO: 0 %
BILIRUB SERPL-MCNC: 0.5 MG/DL
BUN SERPL-MCNC: 23 MG/DL (ref 8–23)
CALCIUM SERPL-MCNC: 9.7 MG/DL (ref 8.8–10.2)
CHLORIDE SERPL-SCNC: 107 MMOL/L (ref 98–107)
CREAT SERPL-MCNC: 1.23 MG/DL (ref 0.51–0.95)
DEPRECATED HCO3 PLAS-SCNC: 23 MMOL/L (ref 22–29)
EOSINOPHIL # BLD AUTO: 0.1 10E3/UL (ref 0–0.7)
EOSINOPHIL NFR BLD AUTO: 1 %
ERYTHROCYTE [DISTWIDTH] IN BLOOD BY AUTOMATED COUNT: 11.7 % (ref 10–15)
GFR SERPL CREATININE-BSD FRML MDRD: 48 ML/MIN/1.73M2
GLUCOSE SERPL-MCNC: 205 MG/DL (ref 70–99)
HCT VFR BLD AUTO: 39.7 % (ref 35–47)
HGB BLD-MCNC: 13.5 G/DL (ref 11.7–15.7)
IMM GRANULOCYTES # BLD: 0 10E3/UL
IMM GRANULOCYTES NFR BLD: 1 %
LYMPHOCYTES # BLD AUTO: 2.4 10E3/UL (ref 0.8–5.3)
LYMPHOCYTES NFR BLD AUTO: 36 %
MCH RBC QN AUTO: 33.9 PG (ref 26.5–33)
MCHC RBC AUTO-ENTMCNC: 34 G/DL (ref 31.5–36.5)
MCV RBC AUTO: 100 FL (ref 78–100)
MONOCYTES # BLD AUTO: 0.5 10E3/UL (ref 0–1.3)
MONOCYTES NFR BLD AUTO: 7 %
NEUTROPHILS # BLD AUTO: 3.7 10E3/UL (ref 1.6–8.3)
NEUTROPHILS NFR BLD AUTO: 55 %
PLATELET # BLD AUTO: 225 10E3/UL (ref 150–450)
POTASSIUM SERPL-SCNC: 4.9 MMOL/L (ref 3.4–5.3)
PROT SERPL-MCNC: 6.8 G/DL (ref 6.4–8.3)
RBC # BLD AUTO: 3.98 10E6/UL (ref 3.8–5.2)
SODIUM SERPL-SCNC: 142 MMOL/L (ref 136–145)
TOTAL PROTEIN SERUM FOR ELP: 6.4 G/DL (ref 6.4–8.3)
WBC # BLD AUTO: 6.8 10E3/UL (ref 4–11)

## 2023-07-05 PROCEDURE — 84165 PROTEIN E-PHORESIS SERUM: CPT

## 2023-07-05 PROCEDURE — 80053 COMPREHEN METABOLIC PANEL: CPT

## 2023-07-05 PROCEDURE — 36415 COLL VENOUS BLD VENIPUNCTURE: CPT

## 2023-07-05 PROCEDURE — 80180 DRUG SCRN QUAN MYCOPHENOLATE: CPT

## 2023-07-05 PROCEDURE — 83521 IG LIGHT CHAINS FREE EACH: CPT

## 2023-07-05 PROCEDURE — 84155 ASSAY OF PROTEIN SERUM: CPT | Mod: 59

## 2023-07-05 PROCEDURE — 82784 ASSAY IGA/IGD/IGG/IGM EACH: CPT

## 2023-07-05 PROCEDURE — 85025 COMPLETE CBC W/AUTO DIFF WBC: CPT

## 2023-07-06 ENCOUNTER — TELEPHONE (OUTPATIENT)
Dept: NEPHROLOGY | Facility: CLINIC | Age: 68
End: 2023-07-06
Payer: COMMERCIAL

## 2023-07-06 LAB
ALBUMIN SERPL ELPH-MCNC: 3.8 G/DL (ref 3.7–5.1)
ALPHA1 GLOB SERPL ELPH-MCNC: 0.4 G/DL (ref 0.2–0.4)
ALPHA2 GLOB SERPL ELPH-MCNC: 0.8 G/DL (ref 0.5–0.9)
B-GLOBULIN SERPL ELPH-MCNC: 0.6 G/DL (ref 0.6–1)
GAMMA GLOB SERPL ELPH-MCNC: 0.9 G/DL (ref 0.7–1.6)
IGA SERPL-MCNC: 184 MG/DL (ref 84–499)
IGG SERPL-MCNC: 884 MG/DL (ref 610–1616)
IGM SERPL-MCNC: 67 MG/DL (ref 35–242)
KAPPA LC FREE SER-MCNC: 3.48 MG/DL (ref 0.33–1.94)
KAPPA LC FREE/LAMBDA FREE SER NEPH: 2.12 {RATIO} (ref 0.26–1.65)
LAMBDA LC FREE SERPL-MCNC: 1.64 MG/DL (ref 0.57–2.63)
M PROTEIN SERPL ELPH-MCNC: 0.1 G/DL
MYCOPHENOLATE SERPL LC/MS/MS-MCNC: 0.99 MG/L (ref 1–3.5)
MYCOPHENOLATE-G SERPL LC/MS/MS-MCNC: 25.5 MG/L (ref 30–95)
PROT PATTERN SERPL ELPH-IMP: ABNORMAL
TME LAST DOSE: ABNORMAL H
TME LAST DOSE: ABNORMAL H

## 2023-07-06 NOTE — TELEPHONE ENCOUNTER
[facility] Call Center    Phone Message    May a detailed message be left on voicemail: yes     Reason for Call: Pt is calling to get results from Lab done yesterday 7/5. Please call pt. Thank you    Action Taken: Other: Neph    Travel Screening: Not Applicable

## 2023-07-07 ENCOUNTER — PATIENT OUTREACH (OUTPATIENT)
Dept: ONCOLOGY | Facility: CLINIC | Age: 68
End: 2023-07-07
Payer: COMMERCIAL

## 2023-07-07 ENCOUNTER — TELEPHONE (OUTPATIENT)
Dept: TRANSPLANT | Facility: CLINIC | Age: 68
End: 2023-07-07
Payer: COMMERCIAL

## 2023-07-07 NOTE — TELEPHONE ENCOUNTER
[11:38 AM] Mariam Barakat I have Eliana Jane MRN:  [id number] on the phone regarding red blotches like bruises on her arms and hands has some concerns still waiting on lab results     [11:38 AM] Teja Love    wiat im on hold.     [11:39 AM] Mariam Barakat    ok let me know or would you like for me to send a encounter    [11:39 AM] Teja Love    the labs were not ordered by transplant    [11:40 AM] Teja Love    have her call primary care and schedule to be seen    [11:40 AM] Mariam Barakat    she said why    [11:41 AM] Teja Love    so they can asses the blotches    [11:42 AM] Mariam Barakat    she really needs to talk with you      Labs were ordered by Dr. Lamb     OUTCOME:  Called back Eliana Jane   Wanted to discuss labs.  Made aware labs are ordered by Dr. Lamb.  Creatinine looks ok. There is no Tac level.  Recommended she call them and make them aware of blotches.  Verbalized understanding and agreement to plan.  [place] [facility].  States she will call his office.

## 2023-07-07 NOTE — TELEPHONE ENCOUNTER
"[facility]: Hematology                                                                                          Patient called to review labs as ordered by .  Her hematology labs appear stable.    She has been having more bruising on the top of her hands for as long as 6 weeks.  Localized to bilateral hands.   No recall of any trauma.  Does report increased stress.    Some bruising on legs, but has bumped into things recently.    Platelets are normal.  Patient is quite concerned about having Leukemia, has a family history,  However reassured her several times her CBC does not indicate any abnormalities.     Encouraged her to track her \"spots\" note size and location and if they resolve and then get new ones.  If she continues to have concerns to see her primary care with Nesha.       Kayla Barkley LPN  Hematology Care Coordination  [phone]  "

## 2023-07-07 NOTE — TELEPHONE ENCOUNTER
Call placed to patient. No answer. Voice message left instructing patient to f\u with ordering provider for requested lab results.

## 2023-07-25 ENCOUNTER — PATIENT OUTREACH (OUTPATIENT)
Dept: ONCOLOGY | Facility: CLINIC | Age: 68
End: 2023-07-25
Payer: COMMERCIAL

## 2023-07-25 NOTE — PROGRESS NOTES
"[facility]: Cancer Care                                                                                        Spoke with pt today after she called asking if she needed to reschedule her visit with Dr. Dilan Lamb that had been scheduled for 8/25.  Discussed that Dr. Dilan Lamb's last day in clinic is 8/25 so she will still have virtual visit with him as planned.  Also discussed that she had her labs drawn on 7/5.  Discussed results including slightly elevated creat (from pt's usual), elevated Alk phos, and elevated Gluc.  She stated she has an upcoming appt with her endocrinologist.  Pt reported she has been having more bruising than usual.  She reported she has gone to bed and in morning woke up with \"big\" bruise on top of her L hand.  Has happened a couple times but than also gone away.  Also some bruises on her legs that come and go.    Sent message to Dr. Dilan Lamb about bruising despite plts 225 and if he wants to order any anti-coag labs or repeat any other standing labs (that were drawn on 7/5/23).    Signature:  Martha West, RN, OCN  "

## 2023-07-28 ENCOUNTER — PATIENT OUTREACH (OUTPATIENT)
Dept: ONCOLOGY | Facility: CLINIC | Age: 68
End: 2023-07-28
Payer: COMMERCIAL

## 2023-07-28 DIAGNOSIS — D47.2 MGUS (MONOCLONAL GAMMOPATHY OF UNKNOWN SIGNIFICANCE): Primary | ICD-10-CM

## 2023-07-28 DIAGNOSIS — R23.3 ABNORMAL BRUISING: ICD-10-CM

## 2023-07-28 NOTE — PROGRESS NOTES
[facility]: Cancer Care                                                                                        Per communication with Dr. Dilan Lamb, Have pt get CBC/d/p, INR, PTT, fibrinogen activity drawn in the relatively near future (not wait until 8/12 lab appt).  She should also avoid ASA unless it is prescribed for a cardiac reason.    Also needs 24 hr urine testing previously ordered abut 2/24/23  Called pt this afternoon but her voice mail box is full x2.  Did send SMS message with clinic phone number x1.    Signature:  Martha West RN, OCN

## 2023-07-31 ENCOUNTER — PATIENT OUTREACH (OUTPATIENT)
Dept: ONCOLOGY | Facility: CLINIC | Age: 68
End: 2023-07-31
Payer: COMMERCIAL

## 2023-07-31 NOTE — PROGRESS NOTES
[facility]: Cancer Care Plan of Care Education Note                                    Discussion with Patient:                                                      Spoke with pt today regarding phone conversation last week on 7/25 regarding unexplained bruising.  Pt reported having telephone issues when author mentioned her voice mail was full last Friday.      Assessment:                                                      Assessment completed with:: Patient    Plan of Care Education   Yearly learning assessment completed?: Yes (see Education tab)  Diagnosis:: Monoclonal gammopath of undetermined significance (MGUS).  Unexplained bruising.  Does patient understand diagnosis?: No (Discussed MGUS is blood disorder affecting plasma cells; cells make immunoglobulin.  Sometimes one clone of immunoglobulin will over produced & not cause issue =MGUS.  Monitor lab work  because sometimes this condition can become a serious issue.)  Tx plan/regimen:: Currently monitoring labs for possible change in MGUS (still need 24 hr urine testing).  Check anticoag blood tests to look at unexplained bruising.  Does patient understand treatment plan/regimen?: Yes (After discuss. on MGUS, pt stated she remembered inform.  Re: bruising, she said she had appt with PCP tomorrow at [facility] but decided to cancel PCP & just get labs drawn. RN did state advantage of PCP is being assessed in person & PCP could order labs.)  Plan of Care:: Lab appointment    Evaluation of Learning  Patient Education Provided: Yes  Response:: (S) Verbalizes understanding (Pt verbalized she understood but had also said couple times during discussion that she had to go to appt she was late for and had other issues too (accident in family).)      Intervention/Education provided during outreach:                                                       Spoke with pt as above.  At end of conversation, she indicated that she was going to cancel her PCP appt tomorrow  and just get labs Dr. Lamb ordered (INR, PTT, and fibrinogen activity).  She also stated couple different times during conversation that she had to go as she was running late to an appt & would not be available later today (something about accident in family).  Encouraged pt to call [facility] line [phone], option 5, option 2, (pt does not have MyChart at this time) if any questions or concerns.    Signature:  Martha West RN, OCN

## 2023-08-10 ENCOUNTER — LAB (OUTPATIENT)
Dept: LAB | Facility: CLINIC | Age: 68
End: 2023-08-10
Payer: COMMERCIAL

## 2023-08-10 ENCOUNTER — TELEPHONE (OUTPATIENT)
Dept: TRANSPLANT | Facility: CLINIC | Age: 68
End: 2023-08-10

## 2023-08-10 DIAGNOSIS — Z20.828 CONTACT WITH AND (SUSPECTED) EXPOSURE TO OTHER VIRAL COMMUNICABLE DISEASES: ICD-10-CM

## 2023-08-10 DIAGNOSIS — Z79.899 ENCOUNTER FOR LONG-TERM CURRENT USE OF MEDICATION: ICD-10-CM

## 2023-08-10 DIAGNOSIS — R23.3 ABNORMAL BRUISING: ICD-10-CM

## 2023-08-10 DIAGNOSIS — D47.2 MGUS (MONOCLONAL GAMMOPATHY OF UNKNOWN SIGNIFICANCE): ICD-10-CM

## 2023-08-10 DIAGNOSIS — Z94.0 KIDNEY REPLACED BY TRANSPLANT: ICD-10-CM

## 2023-08-10 DIAGNOSIS — Z48.298 AFTERCARE FOLLOWING ORGAN TRANSPLANT: Primary | ICD-10-CM

## 2023-08-10 LAB
APTT PPP: 25 SECONDS (ref 22–38)
BASOPHILS # BLD AUTO: 0 10E3/UL (ref 0–0.2)
BASOPHILS NFR BLD AUTO: 0 %
EOSINOPHIL # BLD AUTO: 0.1 10E3/UL (ref 0–0.7)
EOSINOPHIL NFR BLD AUTO: 1 %
ERYTHROCYTE [DISTWIDTH] IN BLOOD BY AUTOMATED COUNT: 11.3 % (ref 10–15)
FIBRINOGEN PPP-MCNC: 408 MG/DL (ref 170–490)
HCT VFR BLD AUTO: 39.5 % (ref 35–47)
HGB BLD-MCNC: 13.5 G/DL (ref 11.7–15.7)
IMM GRANULOCYTES # BLD: 0 10E3/UL
IMM GRANULOCYTES NFR BLD: 0 %
INR PPP: 1.06 (ref 0.85–1.15)
LYMPHOCYTES # BLD AUTO: 1.5 10E3/UL (ref 0.8–5.3)
LYMPHOCYTES NFR BLD AUTO: 30 %
MCH RBC QN AUTO: 33.4 PG (ref 26.5–33)
MCHC RBC AUTO-ENTMCNC: 34.2 G/DL (ref 31.5–36.5)
MCV RBC AUTO: 98 FL (ref 78–100)
MONOCYTES # BLD AUTO: 0.3 10E3/UL (ref 0–1.3)
MONOCYTES NFR BLD AUTO: 6 %
NEUTROPHILS # BLD AUTO: 3.1 10E3/UL (ref 1.6–8.3)
NEUTROPHILS NFR BLD AUTO: 62 %
PLATELET # BLD AUTO: 223 10E3/UL (ref 150–450)
RBC # BLD AUTO: 4.04 10E6/UL (ref 3.8–5.2)
WBC # BLD AUTO: 5.1 10E3/UL (ref 4–11)

## 2023-08-10 PROCEDURE — 85384 FIBRINOGEN ACTIVITY: CPT

## 2023-08-10 PROCEDURE — 85025 COMPLETE CBC W/AUTO DIFF WBC: CPT

## 2023-08-10 PROCEDURE — 36415 COLL VENOUS BLD VENIPUNCTURE: CPT

## 2023-08-10 PROCEDURE — 85730 THROMBOPLASTIN TIME PARTIAL: CPT

## 2023-08-10 PROCEDURE — 85610 PROTHROMBIN TIME: CPT

## 2023-08-10 NOTE — TELEPHONE ENCOUNTER
Patient Call: General  Route to LPN    Reason for call: Chastity from [facility] in [place]  called in regards of patient's needing new standing lab orders. Patient has labs next week. Clinic can provide details of what orders are needed.    Call back needed? Yes    Return Call Needed  Same as documented in contacts section  When to return call?: Same day: Route [place]

## 2023-08-11 ENCOUNTER — PATIENT OUTREACH (OUTPATIENT)
Dept: ONCOLOGY | Facility: CLINIC | Age: 68
End: 2023-08-11
Payer: COMMERCIAL

## 2023-08-11 NOTE — PROGRESS NOTES
"[facility]: Cancer Care                                                                                          Spoke with pt when she called today, 8/11/23.  Pt reported she had labs drawn on 8/10 as instructed re: bruising spots, especially on dorsum of hands.  She also saw her PCP, Dr. Collins Rowley, on 8/1/23. Pt asking about lab results.  Discussed anticoagulation labs, INR, PPT, and fibrinogen activation are all within in \"normal\" as were plts (223k).  In discussing lab work, pt noted that she plans to do 24 urine testing next week.  Will drop off before 8/17 as they are leaving town that day.    Pt reported she is still getting the bruising spots.  Gave example that she went to lunch with friends yesterday.  No bruise before eating but after lunch, she did have bruise on back of her hand.    Sent message to Dr. Dilan Lamb.  RN to call pt back next week with response.    Signature:  Martha West RN, OCN  "

## 2023-08-17 ENCOUNTER — PATIENT OUTREACH (OUTPATIENT)
Dept: ONCOLOGY | Facility: CLINIC | Age: 68
End: 2023-08-17
Payer: COMMERCIAL

## 2023-08-17 NOTE — PROGRESS NOTES
[facility]: Cancer Care                                                                                          Noted pt called but unable to answer phone.  Called her back x2 within 5 minutes of her call but did not reach her; mail box full.    Signature:  Martha West RN, OCN

## 2023-08-24 ENCOUNTER — LAB (OUTPATIENT)
Dept: LAB | Facility: CLINIC | Age: 68
End: 2023-08-24
Payer: COMMERCIAL

## 2023-08-24 DIAGNOSIS — Z94.0 KIDNEY REPLACED BY TRANSPLANT: ICD-10-CM

## 2023-08-24 DIAGNOSIS — Z48.298 AFTERCARE FOLLOWING ORGAN TRANSPLANT: ICD-10-CM

## 2023-08-24 DIAGNOSIS — Z20.828 CONTACT WITH AND (SUSPECTED) EXPOSURE TO OTHER VIRAL COMMUNICABLE DISEASES: ICD-10-CM

## 2023-08-24 DIAGNOSIS — Z79.899 ENCOUNTER FOR LONG-TERM CURRENT USE OF MEDICATION: ICD-10-CM

## 2023-08-24 LAB
ALBUMIN MFR UR ELPH: 9.7 MG/DL
ANION GAP SERPL CALCULATED.3IONS-SCNC: 8 MMOL/L (ref 7–15)
BUN SERPL-MCNC: 19.4 MG/DL (ref 8–23)
CALCIUM SERPL-MCNC: 9.5 MG/DL (ref 8.8–10.2)
CHLORIDE SERPL-SCNC: 106 MMOL/L (ref 98–107)
CREAT SERPL-MCNC: 1.29 MG/DL (ref 0.51–0.95)
CREAT UR-MCNC: 80.4 MG/DL
DEPRECATED HCO3 PLAS-SCNC: 23 MMOL/L (ref 22–29)
ERYTHROCYTE [DISTWIDTH] IN BLOOD BY AUTOMATED COUNT: 11.4 % (ref 10–15)
GFR SERPL CREATININE-BSD FRML MDRD: 45 ML/MIN/1.73M2
GLUCOSE SERPL-MCNC: 177 MG/DL (ref 70–99)
HCT VFR BLD AUTO: 40.3 % (ref 35–47)
HGB BLD-MCNC: 13.8 G/DL (ref 11.7–15.7)
MCH RBC QN AUTO: 33.3 PG (ref 26.5–33)
MCHC RBC AUTO-ENTMCNC: 34.2 G/DL (ref 31.5–36.5)
MCV RBC AUTO: 97 FL (ref 78–100)
PLATELET # BLD AUTO: 208 10E3/UL (ref 150–450)
POTASSIUM SERPL-SCNC: 4.9 MMOL/L (ref 3.4–5.3)
PROT/CREAT 24H UR: 0.12 MG/MG CR (ref 0–0.2)
RBC # BLD AUTO: 4.14 10E6/UL (ref 3.8–5.2)
SODIUM SERPL-SCNC: 137 MMOL/L (ref 136–145)
WBC # BLD AUTO: 6 10E3/UL (ref 4–11)

## 2023-08-24 PROCEDURE — 84156 ASSAY OF PROTEIN URINE: CPT

## 2023-08-24 PROCEDURE — 80048 BASIC METABOLIC PNL TOTAL CA: CPT

## 2023-08-24 PROCEDURE — 80197 ASSAY OF TACROLIMUS: CPT

## 2023-08-24 PROCEDURE — 85027 COMPLETE CBC AUTOMATED: CPT

## 2023-08-24 PROCEDURE — 36415 COLL VENOUS BLD VENIPUNCTURE: CPT

## 2023-08-25 ENCOUNTER — TELEPHONE (OUTPATIENT)
Dept: TRANSPLANT | Facility: CLINIC | Age: 68
End: 2023-08-25

## 2023-08-25 ENCOUNTER — VIRTUAL VISIT (OUTPATIENT)
Dept: ONCOLOGY | Facility: CLINIC | Age: 68
End: 2023-08-25
Attending: INTERNAL MEDICINE
Payer: COMMERCIAL

## 2023-08-25 VITALS — BODY MASS INDEX: 28.66 KG/M2 | WEIGHT: 172 LBS | HEIGHT: 65 IN

## 2023-08-25 DIAGNOSIS — Z48.298 AFTERCARE FOLLOWING ORGAN TRANSPLANT: ICD-10-CM

## 2023-08-25 DIAGNOSIS — Z94.0 KIDNEY TRANSPLANTED: Primary | ICD-10-CM

## 2023-08-25 DIAGNOSIS — D47.2 MGUS (MONOCLONAL GAMMOPATHY OF UNKNOWN SIGNIFICANCE): Primary | ICD-10-CM

## 2023-08-25 DIAGNOSIS — R23.3 ABNORMAL BRUISING: ICD-10-CM

## 2023-08-25 DIAGNOSIS — R79.89 BLOOD CREATININE INCREASED COMPARED WITH PRIOR MEASUREMENT: ICD-10-CM

## 2023-08-25 LAB
TACROLIMUS BLD-MCNC: 7.7 UG/L (ref 5–15)
TME LAST DOSE: NORMAL H
TME LAST DOSE: NORMAL H

## 2023-08-25 PROCEDURE — 99442 PR PHYSICIAN TELEPHONE EVALUATION 11-20 MIN: CPT | Performed by: INTERNAL MEDICINE

## 2023-08-25 ASSESSMENT — ENCOUNTER SYMPTOMS
EYES NEGATIVE: 1
CONSTITUTIONAL NEGATIVE: 1
NEUROLOGICAL NEGATIVE: 1
BRUISES/BLEEDS EASILY: 1
CARDIOVASCULAR NEGATIVE: 1
RESPIRATORY NEGATIVE: 1
PSYCHIATRIC NEGATIVE: 1

## 2023-08-25 ASSESSMENT — PAIN SCALES - GENERAL: PAINLEVEL: MODERATE PAIN (4)

## 2023-08-25 NOTE — NURSING NOTE
Is the patient currently in the state of [place]? YES    Visit mode:TELEPHONE    If the visit is dropped, the patient can be reconnected by: TELEPHONE VISIT: Phone number:   Telephone Information:   Mobile [phone]       Will anyone else be joining the visit? NO  (If patient encounters technical issues they should call [phone])    How would you like to obtain your AVS? Mail a copy    Are changes needed to the allergy or medication list? No    Reason for visit: RICKY MANCILLA

## 2023-08-25 NOTE — TELEPHONE ENCOUNTER
Was told by Dr. Lamb creatinine trending up.    Creatinine = 1.29  (8/24/23)          Abdominal pain, but not over graft. She is seeing a Provider for it.  States she might have missed a couple of doses of her IS meds.  Recommended improving hydration and rechecking BMP and UA/UC Monday.

## 2023-08-25 NOTE — PROGRESS NOTES
Virtual Visit Details    Type of service:  Telephone Visit   Phone call duration: 20 minutes     [facility]  HEMATOLOGY AND MEDICAL ONCOLOGY    FOLLOW-UP VIRTUAL PATIENT VISIT BY TELEPHONE     PATIENT NAME: Eliana Jane   MRN# [id number]     Date of Visit: Aug 25, 2023    Referring Provider: Referred Self, MD  No address on file YOB: 1955     Reason for visit: follow-up for     CHIEF COMPLAINT   RECHECK       HISTORY OF PRESENTING ILLNESS     68 yo woman presented for evaluation of monoclonal protein detected on recent SPEP obtained because of a low anion gap. S/P renal transplant 15 years ago on tacrolimus.     Bone survey: 11/4/21: negative for lytic lesions    INTERVAL HISTORY:    Notes some bruising and had a work-up that was negative for basic coag lab abnormalities. Lesions develop out of the blue, more on the top of the hands, sometime on the forearm. Not really happening right now, though. Fell in January and hurt her arm. Bruising started after that.  Notes some discomfort over the skin of the abdomen      No family history of bleeding. No mucosal bleeding (no significant gingival, vaginal, GI bleeding).     PAST MEDICAL HISTORY     Past Medical History:   Diagnosis Date     Diabetes (H)      Hyperlipidemia      Hypothyroidism      MGUS (monoclonal gammopathy of unknown significance)      Renal transplant recipient 08/16/2007    sister is donor        PAST SURGICAL HISTORY     Past Surgical History:   Procedure Laterality Date     KNEE SURGERY Right 1/23/2015     KNEE SURGERY       NEPHRECTOMY TRANSPLANTED ORGAN           CURRENT OUTPATIENT MEDICATIONS     Current Outpatient Medications   Medication Sig     CELLCEPT (BRAND) 250 MG capsule Take 2 capsules (500 mg) by mouth 2 times daily GENERIC     cholecalciferol (VITAMIN D3) 25 mcg (1000 units) capsule Take 1 capsule (25 mcg) by mouth daily     citalopram (CELEXA) 20 MG tablet Take 2 tablets (40 mg) by  mouth daily     empagliflozin (JARDIANCE) 10 MG TABS tablet Take 1 tablet (10 mg) by mouth daily     glimepiride (AMARYL) 1 MG tablet Take 3 tablets (3 mg) by mouth every morning (before breakfast)     levothyroxine (SYNTHROID/LEVOTHROID) 50 MCG tablet Take 1 tablet (50 mcg) by mouth daily     omeprazole (PRILOSEC) 20 MG CR capsule Take 1 capsule (20 mg) by mouth daily     predniSONE (DELTASONE) 5 MG tablet Take 1 tablet (5 mg) by mouth daily     simvastatin (ZOCOR) 20 MG tablet Take 1 tablet (20 mg) by mouth every morning     tacrolimus (GENERIC EQUIVALENT) 1 MG capsule Take 2 capsules (2 mg) by mouth 2 times daily     Albuterol (VENTOLIN IN) Inhale 1 Inhaler into the lungs daily as needed. (Patient not taking: Reported on 8/25/2023)     fluticasone (VERAMYST) 27.5 MCG/SPRAY spray Spray 2 sprays into both nostrils daily (Patient not taking: Reported on 8/25/2023)     Fluticasone-Salmeterol (ADVAIR DISKUS IN) Inhale 2 Inhalers into the lungs daily as needed. (Patient not taking: Reported on 8/25/2023)     mometasone (NASONEX) 50 MCG/ACT nasal spray Spray 2 sprays into both nostrils daily Reported on 4/7/2017 (Patient not taking: Reported on 12/22/2022)     mometasone-formoterol (DULERA) 200-5 MCG/ACT inhaler Inhale 1 puff into the lungs 2 times daily Reported on 4/7/2017 (Patient not taking: Reported on 12/22/2022)     mycophenolate (GENERIC EQUIVALENT) 250 MG capsule Take 2 capsules (500 mg) by mouth 2 times daily (Patient not taking: Reported on 12/22/2022)     PROGRAF (BRAND) 0.5 MG capsule HOLD (Patient not taking: Reported on 12/22/2022)     PROGRAF (BRAND) 1 MG capsule Take 2 capsules (2 mg) by mouth 2 times daily Total dose = 2 mg twice a day (Patient not taking: Reported on 12/22/2022)     No current facility-administered medications for this visit.        ALLERGIES     Allergies   Allergen Reactions     Dust Mites Unknown     Trees      asthma     .     SOCIAL HISTORY     Social History     Socioeconomic  History     Marital status:      Spouse name: Not on file     Number of children: Not on file     Years of education: Not on file     Highest education level: Not on file   Occupational History     Not on file   Tobacco Use     Smoking status: Never     Smokeless tobacco: Never   Substance and Sexual Activity     Alcohol use: Not on file     Drug use: Not on file     Sexual activity: Not on file   Other Topics Concern     Parent/sibling w/ CABG, MI or angioplasty before 65F 55M? Not Asked   Social History Narrative     Not on file     Social Determinants of Health     Financial Resource Strain: Not on file   Food Insecurity: Not on file   Transportation Needs: Not on file   Physical Activity: Not on file   Stress: Not on file   Social Connections: Not on file   Intimate Partner Violence: Not on file   Housing Stability: Not on file          FAMILY HISTORY     Family History   Problem Relation Age of Onset     Lung Cancer Father      Thyroid Disease Father      Hypothyroidism Mother      Heart Disease Mother      Heart Disease Father      Prostate Cancer Father      Cerebrovascular Disease Paternal Grandfather      Breast Cancer Maternal Grandmother      Hyperlipidemia Mother      Hyperlipidemia Father           REVIEW OF SYSTEMS   Review of Systems   Constitutional: Negative.    HENT: Negative.    Eyes: Negative.    Respiratory: Negative.    Cardiovascular: Negative.    Skin:        Notes some tenderness over abdomen with no visible marks or bruising   Neurological: Negative.    Endo/Heme/Allergies: Bruises/bleeds easily.   Psychiatric/Behavioral: Negative.           PHYSICAL EXAM     Because of the ongoing COVID-19 public health crisis, the patient was evaluated by telephone. The patient sounded well by voice. Tone and speech were normal and appropriate.      LABORATORY AND IMAGING STUDIES     Recent Labs   Lab Test 08/24/23  1101 08/10/23  1316 07/05/23  0847 02/16/23  0746   WBC 6.0 5.1 6.8 7.9   RBC 4.14  4.04 3.98 3.75*   HGB 13.8 13.5 13.5 12.8   HCT 40.3 39.5 39.7 38.7   MCV 97 98 100 103*   MCH 33.3* 33.4* 33.9* 34.1*   MCHC 34.2 34.2 34.0 33.1   RDW 11.4 11.3 11.7 12.2    223 225 214   NEUTROPHIL  --  62 55 67     Recent Labs   Lab Test 08/24/23  1101 07/05/23  0847 02/16/23  0746    142 138   POTASSIUM 4.9 4.9 4.0   CHLORIDE 106 107 108   CO2 23 23 24   ANIONGAP 8 12 6   * 205* 119*   BUN 19.4 23.0 17   CR 1.29* 1.23* 1.09*   CURTIS 9.5 9.7 9.8     Recent Labs   Lab Test 07/05/23  0847 02/16/23  0746 08/09/22  0821   BILITOTAL 0.5 0.9 0.6   ALKPHOS 134* 114 110   AST 18 9 12   ALT 13 18 23     No results for input(s): LDH in the last 73678 hours.  Recent Labs   Lab Test 07/05/23  0847 02/16/23  0746    976   IGM 67 69    166     Recent Labs   Lab Test 07/05/23  0847   ELPM 0.1*   ELPINT Two monoclonal proteins (0.5 and 0.1 g/dL) seen in the gamma fraction. Previously characterized in our laboratory on 10/13/2021 as a monoclonal IgG immunoglobulin of lambda light chain type and IgA immunoglobulin of kappa light chain type. Pathologic significance requires clinical correlation. Radha Anderson M.D., Ph.D.     Recent Labs   Lab Test 02/10/22  0820 10/13/21  1445   UELPI Trace amount of albumin and a few trace globulin bands seen. No obvious monoclonal protein seen. Pathological significance requires clinical correlation.  We normally recommend a first morning voided urine to detect clinically significant proteinuria. The specific gravity of this specimen was only 1.005. Pathological significance requires clinical correlation.  CLAY Johnson M.D., Ph.D., Pathologist ()  --    UIEP  --  Monoclonal IgG immunoglobulin of kappa light chain type. Pathological significance requires clinical correlation.  Francisco Wells M.D., Ph.D.         Results for orders placed or performed in visit on 11/04/21   XR Bone Survey Complete    Narrative    XR BONE SURVEY COMPLETE 11/4/2021 4:51  PM     HISTORY: MGUS (monoclonal gammopathy of unknown significance)    COMPARISON: None.      Impression    IMPRESSION: No lytic or sclerotic lesions are evident. No fractures  are evident. Degenerative changes throughout the spine. Surgical clips  in the pelvis. Internal fixation of the right patella.    GETACHEW LÓPEZ MD         SYSTEM ID:  [id number]          ECOG PS: 0     ASSESSMENT AND RECOMMENDATIONS     Impression:  66 yo woman with MGUS that has been stable. She has orders for a 24 hr urine since it has been while since the last one. Serum light chain and SPEP results have been stable (FLC)  to slightly lower (SPEP) and there is no evidence of progression to myeloma. The patient appears to be a low risk of progression to myeloma.     Regarding her recent bruising, the screening coag studies are unrevealing(normal INR/PTT/fibrinogen activity) and clinically the patient today reports that the bruising has been quiescent. She is slated to see her primary care physician next week and she will let us know if there is any recurrence of bruising. If recurrent, it might be prudent to check an abd fat pad biopsy for amyloidosis thought this is unlikely.     The creatinine from yesterday is slightly elevated (1.29) above the patient's typical baseline. Will leave it to renal transplant service to further evaluate as needed.    Plan:  --Observe  --obtain 24 hr urine for light chains and UPEP  --further workup for bruising if recurrent (abd fat pad biopsy, etc)      Return to Clinic:   6 mos or sooner if needed for bruising eval.    Dilan Lamb MD   of Medicine  Division of Hematology, Oncology and Transplantation  [facility]

## 2023-08-25 NOTE — LETTER
8/25/2023         RE: Eliana Jane  [address]  [place] [address]        Dear Colleague,    Thank you for referring your patient, Eliana Jane, to the [facility]. Please see a copy of my visit note below.    Virtual Visit Details    Type of service:  Telephone Visit   Phone call duration: 20 minutes     [facility] PHYSICIANS  HEMATOLOGY AND MEDICAL ONCOLOGY    FOLLOW-UP VIRTUAL PATIENT VISIT BY TELEPHONE     PATIENT NAME: Eliana Jane   MRN# [id number]     Date of Visit: Aug 25, 2023    Referring Provider: Referred Self, MD  No address on file YOB: 1955     Reason for visit: follow-up for     CHIEF COMPLAINT   RECHECK       HISTORY OF PRESENTING ILLNESS     68 yo woman presented for evaluation of monoclonal protein detected on recent SPEP obtained because of a low anion gap. S/P renal transplant 15 years ago on tacrolimus.     Bone survey: 11/4/21: negative for lytic lesions    INTERVAL HISTORY:    Notes some bruising and had a work-up that was negative for basic coag lab abnormalities. Lesions develop out of the blue, more on the top of the hands, sometime on the forearm. Not really happening right now, though. Fell in January and hurt her arm. Bruising started after that.  Notes some discomfort over the skin of the abdomen      No family history of bleeding. No mucosal bleeding (no significant gingival, vaginal, GI bleeding).     PAST MEDICAL HISTORY     Past Medical History:   Diagnosis Date    Diabetes (H)     Hyperlipidemia     Hypothyroidism     MGUS (monoclonal gammopathy of unknown significance)     Renal transplant recipient 08/16/2007    sister is donor        PAST SURGICAL HISTORY     Past Surgical History:   Procedure Laterality Date    KNEE SURGERY Right 1/23/2015    KNEE SURGERY      NEPHRECTOMY TRANSPLANTED ORGAN           CURRENT OUTPATIENT MEDICATIONS     Current Outpatient Medications    Medication Sig    CELLCEPT (BRAND) 250 MG capsule Take 2 capsules (500 mg) by mouth 2 times daily GENERIC    cholecalciferol (VITAMIN D3) 25 mcg (1000 units) capsule Take 1 capsule (25 mcg) by mouth daily    citalopram (CELEXA) 20 MG tablet Take 2 tablets (40 mg) by mouth daily    empagliflozin (JARDIANCE) 10 MG TABS tablet Take 1 tablet (10 mg) by mouth daily    glimepiride (AMARYL) 1 MG tablet Take 3 tablets (3 mg) by mouth every morning (before breakfast)    levothyroxine (SYNTHROID/LEVOTHROID) 50 MCG tablet Take 1 tablet (50 mcg) by mouth daily    omeprazole (PRILOSEC) 20 MG CR capsule Take 1 capsule (20 mg) by mouth daily    predniSONE (DELTASONE) 5 MG tablet Take 1 tablet (5 mg) by mouth daily    simvastatin (ZOCOR) 20 MG tablet Take 1 tablet (20 mg) by mouth every morning    tacrolimus (GENERIC EQUIVALENT) 1 MG capsule Take 2 capsules (2 mg) by mouth 2 times daily    Albuterol (VENTOLIN IN) Inhale 1 Inhaler into the lungs daily as needed. (Patient not taking: Reported on 8/25/2023)    fluticasone (VERAMYST) 27.5 MCG/SPRAY spray Spray 2 sprays into both nostrils daily (Patient not taking: Reported on 8/25/2023)    Fluticasone-Salmeterol (ADVAIR DISKUS IN) Inhale 2 Inhalers into the lungs daily as needed. (Patient not taking: Reported on 8/25/2023)    mometasone (NASONEX) 50 MCG/ACT nasal spray Spray 2 sprays into both nostrils daily Reported on 4/7/2017 (Patient not taking: Reported on 12/22/2022)    mometasone-formoterol (DULERA) 200-5 MCG/ACT inhaler Inhale 1 puff into the lungs 2 times daily Reported on 4/7/2017 (Patient not taking: Reported on 12/22/2022)    mycophenolate (GENERIC EQUIVALENT) 250 MG capsule Take 2 capsules (500 mg) by mouth 2 times daily (Patient not taking: Reported on 12/22/2022)    PROGRAF (BRAND) 0.5 MG capsule HOLD (Patient not taking: Reported on 12/22/2022)    PROGRAF (BRAND) 1 MG capsule Take 2 capsules (2 mg) by mouth 2 times daily Total dose = 2 mg twice a day (Patient not  taking: Reported on 12/22/2022)     No current facility-administered medications for this visit.        ALLERGIES     Allergies   Allergen Reactions    Dust Mites Unknown    Trees      asthma     .     SOCIAL HISTORY     Social History     Socioeconomic History    Marital status:      Spouse name: Not on file    Number of children: Not on file    Years of education: Not on file    Highest education level: Not on file   Occupational History    Not on file   Tobacco Use    Smoking status: Never    Smokeless tobacco: Never   Substance and Sexual Activity    Alcohol use: Not on file    Drug use: Not on file    Sexual activity: Not on file   Other Topics Concern    Parent/sibling w/ CABG, MI or angioplasty before 65F 55M? Not Asked   Social History Narrative    Not on file     Social Determinants of Health     Financial Resource Strain: Not on file   Food Insecurity: Not on file   Transportation Needs: Not on file   Physical Activity: Not on file   Stress: Not on file   Social Connections: Not on file   Intimate Partner Violence: Not on file   Housing Stability: Not on file          FAMILY HISTORY     Family History   Problem Relation Age of Onset    Lung Cancer Father     Thyroid Disease Father     Hypothyroidism Mother     Heart Disease Mother     Heart Disease Father     Prostate Cancer Father     Cerebrovascular Disease Paternal Grandfather     Breast Cancer Maternal Grandmother     Hyperlipidemia Mother     Hyperlipidemia Father           REVIEW OF SYSTEMS   Review of Systems   Constitutional: Negative.    HENT: Negative.    Eyes: Negative.    Respiratory: Negative.    Cardiovascular: Negative.    Skin:        Notes some tenderness over abdomen with no visible marks or bruising   Neurological: Negative.    Endo/Heme/Allergies: Bruises/bleeds easily.   Psychiatric/Behavioral: Negative.           PHYSICAL EXAM     Because of the ongoing COVID-19 public health crisis, the patient was evaluated by telephone. The  patient sounded well by voice. Tone and speech were normal and appropriate.      LABORATORY AND IMAGING STUDIES     Recent Labs   Lab Test 08/24/23  1101 08/10/23  1316 07/05/23  0847 02/16/23  0746   WBC 6.0 5.1 6.8 7.9   RBC 4.14 4.04 3.98 3.75*   HGB 13.8 13.5 13.5 12.8   HCT 40.3 39.5 39.7 38.7   MCV 97 98 100 103*   MCH 33.3* 33.4* 33.9* 34.1*   MCHC 34.2 34.2 34.0 33.1   RDW 11.4 11.3 11.7 12.2    223 225 214   NEUTROPHIL  --  62 55 67     Recent Labs   Lab Test 08/24/23  1101 07/05/23  0847 02/16/23  0746    142 138   POTASSIUM 4.9 4.9 4.0   CHLORIDE 106 107 108   CO2 23 23 24   ANIONGAP 8 12 6   * 205* 119*   BUN 19.4 23.0 17   CR 1.29* 1.23* 1.09*   CRUTIS 9.5 9.7 9.8     Recent Labs   Lab Test 07/05/23  0847 02/16/23  0746 08/09/22  0821   BILITOTAL 0.5 0.9 0.6   ALKPHOS 134* 114 110   AST 18 9 12   ALT 13 18 23     No results for input(s): LDH in the last 28648 hours.  Recent Labs   Lab Test 07/05/23  0847 02/16/23  0746    976   IGM 67 69    166     Recent Labs   Lab Test 07/05/23  0847   ELPM 0.1*   ELPINT Two monoclonal proteins (0.5 and 0.1 g/dL) seen in the gamma fraction. Previously characterized in our laboratory on 10/13/2021 as a monoclonal IgG immunoglobulin of lambda light chain type and IgA immunoglobulin of kappa light chain type. Pathologic significance requires clinical correlation. Radha Anderson M.D., Ph.D.     Recent Labs   Lab Test 02/10/22  0820 10/13/21  1445   UELPI Trace amount of albumin and a few trace globulin bands seen. No obvious monoclonal protein seen. Pathological significance requires clinical correlation.  We normally recommend a first morning voided urine to detect clinically significant proteinuria. The specific gravity of this specimen was only 1.005. Pathological significance requires clinical correlation.  CLAY Johnson M.D., Ph.D., Pathologist ()  --    UIEP  --  Monoclonal IgG immunoglobulin of kappa light chain type.  Pathological significance requires clinical correlation.  Franicsco Wells M.D., Ph.D.         Results for orders placed or performed in visit on 11/04/21   XR Bone Survey Complete    Narrative    XR BONE SURVEY COMPLETE 11/4/2021 4:51 PM     HISTORY: MGUS (monoclonal gammopathy of unknown significance)    COMPARISON: None.      Impression    IMPRESSION: No lytic or sclerotic lesions are evident. No fractures  are evident. Degenerative changes throughout the spine. Surgical clips  in the pelvis. Internal fixation of the right patella.    GETACHEW LÓPEZ MD         SYSTEM ID:  [id number]          ECOG PS: 0     ASSESSMENT AND RECOMMENDATIONS     Impression:  66 yo woman with MGUS that has been stable. She has orders for a 24 hr urine since it has been while since the last one. Serum light chain and SPEP results have been stable (FLC)  to slightly lower (SPEP) and there is no evidence of progression to myeloma. The patient appears to be a low risk of progression to myeloma.     Regarding her recent bruising, the screening coag studies are unrevealing(normal INR/PTT/fibrinogen activity) and clinically the patient today reports that the bruising has been quiescent. She is slated to see her primary care physician next week and she will let us know if there is any recurrence of bruising. If recurrent, it might be prudent to check an abd fat pad biopsy for amyloidosis thought this is unlikely.     The creatinine from yesterday is slightly elevated (1.29) above the patient's typical baseline. Will leave it to renal transplant service to further evaluate as needed.    Plan:  --Observe  --obtain 24 hr urine for light chains and UPEP  --further workup for bruising if recurrent (abd fat pad biopsy, etc)      Return to Clinic:   6 mos or sooner if needed for bruising eval.    Dilan Lamb MD   of Medicine  Division of Hematology, Oncology and Transplantation  [facility]

## 2023-08-28 DIAGNOSIS — Z94.0 KIDNEY TRANSPLANTED: Primary | ICD-10-CM

## 2023-08-28 NOTE — TELEPHONE ENCOUNTER
Tacrolimus = 7.7  (8/24/23)  Goal 4-6  Current tac dose  2 mg BID    Previous level also slightly elevated    PLAN:   Call and confirm this was a good 12-hour trough.   Verify current dose.   Confirm no new medications or illness (yani. Diarrhea).  If good trough and correct dose above, recommend:  decrease dose to 2.0 mg / 1.5 mg mg BID.   Recheck level in 2 weeks and make sure it is a good trough to avoid additional lab draws.

## 2023-08-31 NOTE — TELEPHONE ENCOUNTER
Call placed to patient. Patient confirms current dose and accurate trough level. Denies any recent illness. Diarrhea or medication changes. Patient note some constipation and is concerned with increasing creatinine. Note that from time to time she doesn't take her tacrolimus dose 12 hours from each other. Education provided on ensuring doses are taking at 10a and 10p and scheduling level at 10 a and taking medication after lab draw. Patient v\u to decrease dose to 2/1.5 and repeat level in 2 weeks. Order/rx sent

## 2023-09-01 RX ORDER — TACROLIMUS 0.5 MG/1
0.5 CAPSULE ORAL EVERY EVENING
Qty: 30 CAPSULE | Refills: 11 | Status: SHIPPED | OUTPATIENT
Start: 2023-09-01 | End: 2024-03-18

## 2023-09-01 RX ORDER — TACROLIMUS 1 MG/1
CAPSULE ORAL
Qty: 270 CAPSULE | Refills: 3 | Status: SHIPPED | OUTPATIENT
Start: 2023-09-01 | End: 2024-03-18

## 2023-10-02 ENCOUNTER — NURSE TRIAGE (OUTPATIENT)
Dept: ONCOLOGY | Facility: CLINIC | Age: 68
End: 2023-10-02
Payer: COMMERCIAL

## 2023-10-02 DIAGNOSIS — D47.2 MGUS (MONOCLONAL GAMMOPATHY OF UNKNOWN SIGNIFICANCE): Primary | ICD-10-CM

## 2023-10-07 ENCOUNTER — LAB (OUTPATIENT)
Dept: LAB | Facility: CLINIC | Age: 68
End: 2023-10-07
Payer: COMMERCIAL

## 2023-10-07 DIAGNOSIS — Z48.298 AFTERCARE FOLLOWING ORGAN TRANSPLANT: ICD-10-CM

## 2023-10-07 DIAGNOSIS — D47.2 MGUS (MONOCLONAL GAMMOPATHY OF UNKNOWN SIGNIFICANCE): ICD-10-CM

## 2023-10-07 DIAGNOSIS — Z94.0 KIDNEY TRANSPLANTED: ICD-10-CM

## 2023-10-07 LAB
ALBUMIN SERPL BCG-MCNC: 4.1 G/DL (ref 3.5–5.2)
ALBUMIN UR-MCNC: NEGATIVE MG/DL
ALP SERPL-CCNC: 126 U/L (ref 35–104)
ALT SERPL W P-5'-P-CCNC: 12 U/L (ref 0–50)
ANION GAP SERPL CALCULATED.3IONS-SCNC: 8 MMOL/L (ref 7–15)
APPEARANCE UR: CLEAR
AST SERPL W P-5'-P-CCNC: 16 U/L (ref 0–45)
BASO+EOS+MONOS # BLD AUTO: ABNORMAL 10*3/UL
BASO+EOS+MONOS NFR BLD AUTO: ABNORMAL %
BASOPHILS # BLD AUTO: 0 10E3/UL (ref 0–0.2)
BASOPHILS NFR BLD AUTO: 0 %
BILIRUB SERPL-MCNC: 0.4 MG/DL
BILIRUB UR QL STRIP: NEGATIVE
BUN SERPL-MCNC: 16.6 MG/DL (ref 8–23)
CALCIUM SERPL-MCNC: 9.6 MG/DL (ref 8.8–10.2)
CHLORIDE SERPL-SCNC: 105 MMOL/L (ref 98–107)
COLOR UR AUTO: YELLOW
CREAT SERPL-MCNC: 1.14 MG/DL (ref 0.51–0.95)
DEPRECATED HCO3 PLAS-SCNC: 26 MMOL/L (ref 22–29)
EGFRCR SERPLBLD CKD-EPI 2021: 52 ML/MIN/1.73M2
EOSINOPHIL # BLD AUTO: 0.1 10E3/UL (ref 0–0.7)
EOSINOPHIL NFR BLD AUTO: 2 %
ERYTHROCYTE [DISTWIDTH] IN BLOOD BY AUTOMATED COUNT: 11.4 % (ref 10–15)
GLUCOSE SERPL-MCNC: 169 MG/DL (ref 70–99)
GLUCOSE UR STRIP-MCNC: NEGATIVE MG/DL
HCT VFR BLD AUTO: 42.4 % (ref 35–47)
HGB BLD-MCNC: 14.2 G/DL (ref 11.7–15.7)
HGB UR QL STRIP: NEGATIVE
IMM GRANULOCYTES # BLD: 0 10E3/UL
IMM GRANULOCYTES NFR BLD: 1 %
KETONES UR STRIP-MCNC: NEGATIVE MG/DL
LEUKOCYTE ESTERASE UR QL STRIP: NEGATIVE
LYMPHOCYTES # BLD AUTO: 1.9 10E3/UL (ref 0.8–5.3)
LYMPHOCYTES NFR BLD AUTO: 32 %
MCH RBC QN AUTO: 33.3 PG (ref 26.5–33)
MCHC RBC AUTO-ENTMCNC: 33.5 G/DL (ref 31.5–36.5)
MCV RBC AUTO: 100 FL (ref 78–100)
MONOCYTES # BLD AUTO: 0.4 10E3/UL (ref 0–1.3)
MONOCYTES NFR BLD AUTO: 7 %
NEUTROPHILS # BLD AUTO: 3.5 10E3/UL (ref 1.6–8.3)
NEUTROPHILS NFR BLD AUTO: 58 %
NITRATE UR QL: NEGATIVE
NT-PROBNP SERPL-MCNC: 254 PG/ML (ref 0–900)
PH UR STRIP: 5.5 [PH] (ref 5–7)
PLATELET # BLD AUTO: 224 10E3/UL (ref 150–450)
POTASSIUM SERPL-SCNC: 4.5 MMOL/L (ref 3.4–5.3)
PROT SERPL-MCNC: 6.7 G/DL (ref 6.4–8.3)
RBC # BLD AUTO: 4.26 10E6/UL (ref 3.8–5.2)
RBC #/AREA URNS AUTO: ABNORMAL /HPF
SODIUM SERPL-SCNC: 139 MMOL/L (ref 135–145)
SP GR UR STRIP: 1.01 (ref 1–1.03)
SQUAMOUS #/AREA URNS AUTO: ABNORMAL /LPF
UROBILINOGEN UR STRIP-ACNC: 0.2 E.U./DL
WBC # BLD AUTO: 6 10E3/UL (ref 4–11)
WBC #/AREA URNS AUTO: ABNORMAL /HPF

## 2023-10-07 PROCEDURE — 80197 ASSAY OF TACROLIMUS: CPT

## 2023-10-07 PROCEDURE — 81001 URINALYSIS AUTO W/SCOPE: CPT

## 2023-10-07 PROCEDURE — 36415 COLL VENOUS BLD VENIPUNCTURE: CPT

## 2023-10-07 PROCEDURE — 83880 ASSAY OF NATRIURETIC PEPTIDE: CPT

## 2023-10-07 PROCEDURE — 80053 COMPREHEN METABOLIC PANEL: CPT

## 2023-10-07 PROCEDURE — 85025 COMPLETE CBC W/AUTO DIFF WBC: CPT

## 2023-10-08 LAB
TACROLIMUS BLD-MCNC: 5.9 UG/L (ref 5–15)
TME LAST DOSE: NORMAL H
TME LAST DOSE: NORMAL H

## 2023-10-13 ENCOUNTER — PATIENT OUTREACH (OUTPATIENT)
Dept: ONCOLOGY | Facility: CLINIC | Age: 68
End: 2023-10-13
Payer: COMMERCIAL

## 2023-10-13 NOTE — PROGRESS NOTES
"[facility]: Cancer Care                                                                                        Situation: Spoke with pt when she called this morning as happened to see (per caller ID) that pt called last evening but did not leave message.  Pt requesting to review lab results from 10/7/23.    Background:  Pt had labs drawn because of her having more \"raspberry spots\" developing.  Pt called triage on 10/2/23 and Dr. Glass added some labs since she was going to have labs drawn for nephrology transplant.  Assessment:  Reviewed 10/7/23 lab results with pt including WBC 6.0 with ANC 3.5, Hgb 14.2, plts 224; all within norm.  Also discussed CMP showed elevated creat of 1.14 but better than Aug result of 1.29.  GFR low at 52 but better than Aug when was 45. Gluc elevated at 169.  Per pt this was fasting blood sugar but she is diabetic.  (RN verified Dr. Glass saw lab results.)  Education:  Discussed alkaline phosphatase is elevated at 126, though a little better than Aug when it was 177.  Discussed alk phos is an enzyme present in many parts of the body. This test measures the amount of the enzyme in the blood. Abnormal levels can be related to a variety of health conditions so is more of a generalized test that we follow.   Recommendation:  At this time labs are overall stable and no need to return to clinic earlier than currently scheduled with Dr. Santino Glass on Feb 28th.      Signature:  Martha West RN, OCN      "

## 2024-02-27 ENCOUNTER — LAB (OUTPATIENT)
Dept: LAB | Facility: CLINIC | Age: 69
End: 2024-02-27
Payer: COMMERCIAL

## 2024-02-27 DIAGNOSIS — D47.2 MGUS (MONOCLONAL GAMMOPATHY OF UNKNOWN SIGNIFICANCE): Primary | ICD-10-CM

## 2024-02-27 DIAGNOSIS — D47.2 MGUS (MONOCLONAL GAMMOPATHY OF UNKNOWN SIGNIFICANCE): ICD-10-CM

## 2024-02-27 DIAGNOSIS — Z20.828 CONTACT WITH AND (SUSPECTED) EXPOSURE TO OTHER VIRAL COMMUNICABLE DISEASES: ICD-10-CM

## 2024-02-27 DIAGNOSIS — Z48.298 AFTERCARE FOLLOWING ORGAN TRANSPLANT: ICD-10-CM

## 2024-02-27 DIAGNOSIS — Z94.0 KIDNEY REPLACED BY TRANSPLANT: ICD-10-CM

## 2024-02-27 DIAGNOSIS — Z79.899 ENCOUNTER FOR LONG-TERM CURRENT USE OF MEDICATION: ICD-10-CM

## 2024-02-27 LAB
ALBUMIN MFR UR ELPH: 30.1 MG/DL
ALBUMIN SERPL BCG-MCNC: 4 G/DL (ref 3.5–5.2)
ALP SERPL-CCNC: 152 U/L (ref 40–150)
ALT SERPL W P-5'-P-CCNC: 18 U/L (ref 0–50)
ANION GAP SERPL CALCULATED.3IONS-SCNC: 8 MMOL/L (ref 7–15)
ANION GAP SERPL CALCULATED.3IONS-SCNC: 8 MMOL/L (ref 7–15)
AST SERPL W P-5'-P-CCNC: 18 U/L (ref 0–45)
BASOPHILS # BLD AUTO: 0 10E3/UL (ref 0–0.2)
BASOPHILS NFR BLD AUTO: 0 %
BILIRUB SERPL-MCNC: 0.4 MG/DL
BUN SERPL-MCNC: 19.6 MG/DL (ref 8–23)
BUN SERPL-MCNC: 19.7 MG/DL (ref 8–23)
CALCIUM SERPL-MCNC: 9.7 MG/DL (ref 8.8–10.2)
CALCIUM SERPL-MCNC: 9.8 MG/DL (ref 8.8–10.2)
CHLORIDE SERPL-SCNC: 105 MMOL/L (ref 98–107)
CHLORIDE SERPL-SCNC: 106 MMOL/L (ref 98–107)
CREAT SERPL-MCNC: 1.19 MG/DL (ref 0.51–0.95)
CREAT SERPL-MCNC: 1.22 MG/DL (ref 0.51–0.95)
CREAT UR-MCNC: 108 MG/DL
DEPRECATED HCO3 PLAS-SCNC: 24 MMOL/L (ref 22–29)
DEPRECATED HCO3 PLAS-SCNC: 25 MMOL/L (ref 22–29)
EGFRCR SERPLBLD CKD-EPI 2021: 48 ML/MIN/1.73M2
EGFRCR SERPLBLD CKD-EPI 2021: 49 ML/MIN/1.73M2
EOSINOPHIL # BLD AUTO: 0.2 10E3/UL (ref 0–0.7)
EOSINOPHIL NFR BLD AUTO: 3 %
ERYTHROCYTE [DISTWIDTH] IN BLOOD BY AUTOMATED COUNT: 11.7 % (ref 10–15)
ERYTHROCYTE [DISTWIDTH] IN BLOOD BY AUTOMATED COUNT: 11.8 % (ref 10–15)
GLUCOSE SERPL-MCNC: 177 MG/DL (ref 70–99)
GLUCOSE SERPL-MCNC: 177 MG/DL (ref 70–99)
HCT VFR BLD AUTO: 38.9 % (ref 35–47)
HCT VFR BLD AUTO: 39.3 % (ref 35–47)
HGB BLD-MCNC: 13.1 G/DL (ref 11.7–15.7)
HGB BLD-MCNC: 13.1 G/DL (ref 11.7–15.7)
HOLD SPECIMEN: NORMAL
IMM GRANULOCYTES # BLD: 0 10E3/UL
IMM GRANULOCYTES NFR BLD: 0 %
LYMPHOCYTES # BLD AUTO: 2.3 10E3/UL (ref 0.8–5.3)
LYMPHOCYTES NFR BLD AUTO: 41 %
MCH RBC QN AUTO: 33 PG (ref 26.5–33)
MCH RBC QN AUTO: 33.7 PG (ref 26.5–33)
MCHC RBC AUTO-ENTMCNC: 33.3 G/DL (ref 31.5–36.5)
MCHC RBC AUTO-ENTMCNC: 33.7 G/DL (ref 31.5–36.5)
MCV RBC AUTO: 100 FL (ref 78–100)
MCV RBC AUTO: 99 FL (ref 78–100)
MONOCYTES # BLD AUTO: 0.5 10E3/UL (ref 0–1.3)
MONOCYTES NFR BLD AUTO: 10 %
NEUTROPHILS # BLD AUTO: 2.5 10E3/UL (ref 1.6–8.3)
NEUTROPHILS NFR BLD AUTO: 46 %
PLATELET # BLD AUTO: 189 10E3/UL (ref 150–450)
PLATELET # BLD AUTO: 198 10E3/UL (ref 150–450)
POTASSIUM SERPL-SCNC: 4.9 MMOL/L (ref 3.4–5.3)
POTASSIUM SERPL-SCNC: 5 MMOL/L (ref 3.4–5.3)
PROT SERPL-MCNC: 6.5 G/DL (ref 6.4–8.3)
PROT/CREAT 24H UR: 0.28 MG/MG CR (ref 0–0.2)
RBC # BLD AUTO: 3.89 10E6/UL (ref 3.8–5.2)
RBC # BLD AUTO: 3.97 10E6/UL (ref 3.8–5.2)
SODIUM SERPL-SCNC: 138 MMOL/L (ref 135–145)
SODIUM SERPL-SCNC: 138 MMOL/L (ref 135–145)
TACROLIMUS BLD-MCNC: 10.4 UG/L (ref 5–15)
TME LAST DOSE: NORMAL H
TME LAST DOSE: NORMAL H
TOTAL PROTEIN SERUM FOR ELP: 6.3 G/DL (ref 6.4–8.3)
WBC # BLD AUTO: 5.4 10E3/UL (ref 4–11)
WBC # BLD AUTO: 5.5 10E3/UL (ref 4–11)

## 2024-02-27 PROCEDURE — 84165 PROTEIN E-PHORESIS SERUM: CPT | Performed by: PATHOLOGY

## 2024-02-27 PROCEDURE — 36415 COLL VENOUS BLD VENIPUNCTURE: CPT

## 2024-02-27 PROCEDURE — 80197 ASSAY OF TACROLIMUS: CPT

## 2024-02-27 PROCEDURE — 85025 COMPLETE CBC W/AUTO DIFF WBC: CPT

## 2024-02-27 PROCEDURE — 86334 IMMUNOFIX E-PHORESIS SERUM: CPT | Performed by: PATHOLOGY

## 2024-02-27 PROCEDURE — 83521 IG LIGHT CHAINS FREE EACH: CPT

## 2024-02-27 PROCEDURE — 84156 ASSAY OF PROTEIN URINE: CPT

## 2024-02-27 PROCEDURE — 80053 COMPREHEN METABOLIC PANEL: CPT

## 2024-02-27 PROCEDURE — 84155 ASSAY OF PROTEIN SERUM: CPT | Mod: 59

## 2024-02-28 ENCOUNTER — PATIENT OUTREACH (OUTPATIENT)
Dept: ONCOLOGY | Facility: CLINIC | Age: 69
End: 2024-02-28
Payer: COMMERCIAL

## 2024-02-28 ENCOUNTER — TELEPHONE (OUTPATIENT)
Dept: TRANSPLANT | Facility: CLINIC | Age: 69
End: 2024-02-28
Payer: COMMERCIAL

## 2024-02-28 DIAGNOSIS — Z94.0 KIDNEY TRANSPLANTED: ICD-10-CM

## 2024-02-28 DIAGNOSIS — Z48.298 AFTERCARE FOLLOWING ORGAN TRANSPLANT: Primary | ICD-10-CM

## 2024-02-28 LAB
ALBUMIN SERPL ELPH-MCNC: 3.8 G/DL (ref 3.7–5.1)
ALPHA1 GLOB SERPL ELPH-MCNC: 0.3 G/DL (ref 0.2–0.4)
ALPHA2 GLOB SERPL ELPH-MCNC: 0.7 G/DL (ref 0.5–0.9)
B-GLOBULIN SERPL ELPH-MCNC: 0.6 G/DL (ref 0.6–1)
GAMMA GLOB SERPL ELPH-MCNC: 0.9 G/DL (ref 0.7–1.6)
KAPPA LC FREE SER-MCNC: 4.35 MG/DL (ref 0.33–1.94)
KAPPA LC FREE/LAMBDA FREE SER NEPH: 2.1 {RATIO} (ref 0.26–1.65)
LAMBDA LC FREE SERPL-MCNC: 2.07 MG/DL (ref 0.57–2.63)
LOCATION OF TASK: ABNORMAL
LOCATION OF TASK: NORMAL
M PROTEIN SERPL ELPH-MCNC: 0.1 G/DL
PROT PATTERN SERPL ELPH-IMP: ABNORMAL
PROT PATTERN SERPL IFE-IMP: NORMAL

## 2024-02-28 NOTE — PROGRESS NOTES
"[facility]: Cancer Care                                                                                          Eliana calls stating that she fell overnight and her right leg is bruised and she is unable to drive herself to the clinic today to see Dr. Glass.    I ask about her leg and she states it is just black and blue and is her \"driving leg.\"  She does not have anyone to bring her to clinic today.  I ask if her leg needs to be evaluated and she states no \"it is fine\" I just fall sometimes.  She asks about switching to a virtual appointment but states that her computer is not working so she is unable to do a video visit.  Eliana said her phone is also not an option for doing a video visit .  I let her know I would review this with Dr. Glass and I would call her back    Call returned to Lianet and I informed her that Dr. Glass said it would be best to change to an in person visit with his next available opening.  Lianet was agreeable to this plan.  Scheduling will contact her with a new appointment time.      Signature:  Loyda Guajardo RN  "

## 2024-02-28 NOTE — TELEPHONE ENCOUNTER
ISSUE:   Tacrolimus IR level 10.4 on 2/27/24, goal 4-6, dose 2 mg in AM and 1.5 mg in PM.    PLAN:   Call Patient and confirm this was an accurate 12-hour trough.   Verify Tacrolimus IR dose 2 mg in AM and 1.5 mg in PM.   Confirm no new medications or illness.   Confirm no missed doses.   If accurate trough and accurate dose, stay on the same dose Tacrolimus IR and repeat lab.     Is this more than a 50% increase or decrease in current IS dose: No  If YES, justification: n/a    Repeat labs in 1-2 weeks.  *If > 50% change in immunosuppression dose, repeat labs in 1 week.     OUTCOME:   Spoke with Patient, they confirm accurate trough level and current dose 2 mg in AM and 1.5 mg in PM., but patient took 2 mg in PM on accident night before draw.  Patient agreed to repeat labs in 1 weeks.   Orders sent to  lab for repeat labs.   Patient voiced understanding of plan.     Reviewed increase in proteinuria. No s/s of UTI, BP's ~130/80's in Care Everywhere. Reviewed blood glucose control.

## 2024-03-12 DIAGNOSIS — E78.5 HYPERLIPIDEMIA: Primary | ICD-10-CM

## 2024-03-12 RX ORDER — SIMVASTATIN 20 MG
20 TABLET ORAL EVERY MORNING
Qty: 90 TABLET | Refills: 0 | Status: SHIPPED | OUTPATIENT
Start: 2024-03-12 | End: 2024-09-18

## 2024-03-12 NOTE — PROGRESS NOTES
HEMATOLOGY AND ONCOLOGY  [facility]INIC  FOLLOW-UP VISIT    NAME: Eliana Jane DEBORAH: Mar 13, 2025   MRN: [id number]      DIAGNOSES:  # MGUS      Oncology History   #MGUS  Previously seen by Dr. Lamb at [facility].   9/2021: two M protein detected 0.5g and 0.1g. UPEP with    1. Monoclonal IgG immunoglobulin of lambda light chain type   2. Monoclonal IgA immunoglobulin of kappa light chain type   Light chains: Kappa 2.43, Lambda 1.83, K/L ratio 1.83   Bone survey 11/2021: No lytic or sclerotic lesions are evident       Interval History     69 yo F with PMH of renal transplant 8/16/2007 on immunosuppression, h/o PE 2015, HTN, asthma, esophageal reflux, hypothyroidism, T2IDDM, and MGUS.     The pt states that she had a h/o renal transplant in 2007. She states that the cause of her kidney failure is unknown. She takes prednisone 5mg daily and tacro BID as 2mg/1.5mg, and cellcept 250mg.     She states that she has had easy bruising. She c/o bruising that she would notice if she knock her hands against something. This was addressed by Dr. Case last summer and her coags test were checked (INR, APTT, fibrinoge), which were all WNL.her bruise can be as large as one inch in size.     No new pain, no bone pain. She had some intentional weight loss by eating less, lost about 10 lbs. No fever, no night sweats. The patient does not report any SOB, CP, abd pain/n/v/d, dysuria, joint pain, rash.    She has a FH of leukemia in her maternal grandmother.     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     ROS: Negative other than as stated in above interval history.      Current Outpatient Medications   Medication    Albuterol (VENTOLIN IN)    CELLCEPT (BRAND) 250 MG capsule    cholecalciferol (VITAMIN D3) 25 mcg (1000 units) capsule    citalopram (CELEXA) 20 MG tablet    empagliflozin (JARDIANCE) 10 MG TABS tablet    fluticasone (VERAMYST) 27.5 MCG/SPRAY spray    Fluticasone-Salmeterol (ADVAIR  DISKUS IN)    glimepiride (AMARYL) 1 MG tablet    levothyroxine (SYNTHROID/LEVOTHROID) 50 MCG tablet    mometasone (NASONEX) 50 MCG/ACT nasal spray    mometasone-formoterol (DULERA) 200-5 MCG/ACT inhaler    mycophenolate (GENERIC EQUIVALENT) 250 MG capsule    omeprazole (PRILOSEC) 20 MG CR capsule    predniSONE (DELTASONE) 5 MG tablet    PROGRAF (BRAND) 0.5 MG capsule    PROGRAF (BRAND) 1 MG capsule    simvastatin (ZOCOR) 20 MG tablet    tacrolimus (GENERIC EQUIVALENT) 0.5 MG capsule    tacrolimus (GENERIC EQUIVALENT) 1 MG capsule     No current facility-administered medications for this visit.         Physical Exams     There were no vitals taken for this visit.  Wt Readings from Last 3 Encounters:   08/25/23 78 kg (172 lb)   12/22/22 79.4 kg (175 lb)   10/15/21 88.1 kg (194 lb 4.8 oz)     General: NAD; H&N: no mucosal lesions; Lungs clear; Heart RRR; Abdomen; Soft, No organomegaly; Extremities: No edema; Skin: No rash; Neuro: Nonfocal; Mood/Affect: appropriate; Lymph: no LAD      Assessment and Plan     67 yo F with PMH of renal transplant 8/16/2007, h/o PE 2015, HTN, asthma, esophageal reflux, and MGUS.     #MGUS  #H/o renal transplant 8/2007  #H/o PE 2015    The pt has a h/o MGUS from 2021. She has been detected two M protein, IgG lambda and IgA kappa type. She has had NL range Hb and Ca levels, and her Scr has ranged around 1.0-1.2. Bone survey done in 2021 was unremarkable. Her M protein were initially 0.5 and 0.1, and her M peak has been 0.1 recently.     The above plan was discussed with Dr. Glass, who agrees with the assessment and plan.     Thank you for allowing me to participate in the care of this patient. Please do not hesitate to contact me if there are any concerns or questions.     Cody Kilgore MD  Hem/onc fellow  Division of Hematology, Oncology, and Transplantation  [facility]    HEMATOLOGIC MALIGNANCY ATTENDING ATTESTATION     I, Santino Glass MD have seen and personally evaluated the  patient Eliana Jane as part of a shared visit. I have reviewed and discussed with the fellow their history, physical and plan. I personally reviewed the vital signs, medications, labs, and imaging. I personally provided a substantive portion of the care for this patient and performed the medical decision making. I agree with their assessment and plan, with any exceptions noted in my documentation below.     Ms. Jane is a 68 year old woman new to my practice, with a history of low-risk MGUS and a history of kidney transplant in 2007, stable on long-term immunosuppression. Her M-spike has waxed and waned, lately downtrending to 0.1, with a normal FLC ratio. Bone survey in 2021 was unremarkable, Creatinine stable, no indication of anemia or bony pain. Will monitor every 6 months.     Santino Glass MD, PhD  [facility]  Division of Hematology, Oncology, and Transplantation

## 2024-03-13 ENCOUNTER — ONCOLOGY VISIT (OUTPATIENT)
Dept: ONCOLOGY | Facility: CLINIC | Age: 69
End: 2024-03-13
Attending: INTERNAL MEDICINE
Payer: COMMERCIAL

## 2024-03-13 VITALS
WEIGHT: 174 LBS | RESPIRATION RATE: 16 BRPM | HEART RATE: 69 BPM | OXYGEN SATURATION: 97 % | DIASTOLIC BLOOD PRESSURE: 80 MMHG | SYSTOLIC BLOOD PRESSURE: 131 MMHG | TEMPERATURE: 97.6 F | BODY MASS INDEX: 28.96 KG/M2

## 2024-03-13 DIAGNOSIS — D47.2 MGUS (MONOCLONAL GAMMOPATHY OF UNKNOWN SIGNIFICANCE): Primary | ICD-10-CM

## 2024-03-13 PROCEDURE — 99214 OFFICE O/P EST MOD 30 MIN: CPT | Performed by: INTERNAL MEDICINE

## 2024-03-13 PROCEDURE — G0463 HOSPITAL OUTPT CLINIC VISIT: HCPCS | Performed by: INTERNAL MEDICINE

## 2024-03-13 RX ORDER — INSULIN GLARGINE 100 [IU]/ML
INJECTION, SOLUTION SUBCUTANEOUS
COMMUNITY

## 2024-03-13 ASSESSMENT — PAIN SCALES - GENERAL: PAINLEVEL: NO PAIN (0)

## 2024-03-13 NOTE — LETTER
3/13/2024         RE: Eliana Jane  [address]  [place] [address]        Dear Colleague,    Thank you for referring your patient, Eliana Jane, to the [facility]. Please see a copy of my visit note below.    HEMATOLOGY AND ONCOLOGY  [facility]  FOLLOW-UP VISIT    NAME: Eliana Jane DEBORAH: Mar 13, 2024   MRN: [id number]      DIAGNOSES:  # MGUS      Oncology History   #MGUS  Previously seen by Dr. Lamb at [facility].   9/2021: two M protein detected 0.5g and 0.1g. UPEP with    1. Monoclonal IgG immunoglobulin of lambda light chain type   2. Monoclonal IgA immunoglobulin of kappa light chain type   Light chains: Kappa 2.43, Lambda 1.83, K/L ratio 1.83   Bone survey 11/2021: No lytic or sclerotic lesions are evident       Interval History     69 yo F with PMH of renal transplant 8/16/2007 on immunosuppression, h/o PE 2015, HTN, asthma, esophageal reflux, hypothyroidism, T2IDDM, and MGUS.     The pt states that she had a h/o renal transplant in 2007. She states that the cause of her kidney failure is unknown. She takes prednisone 5mg daily and tacro BID as 2mg/1.5mg, and cellcept 250mg.     She states that she has had easy bruising. She c/o bruising that she would notice if she knock her hands against something. This was addressed by Dr. Case last summer and her coags test were checked (INR, APTT, fibrinoge), which were all WNL.her bruise can be as large as one inch in size.     No new pain, no bone pain. She had some intentional weight loss by eating less, lost about 10 lbs. No fever, no night sweats. The patient does not report any SOB, CP, abd pain/n/v/d, dysuria, joint pain, rash.    She has a FH of leukemia in her maternal grandmother.     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     ROS: Negative other than as stated in above interval history.      Current Outpatient Medications   Medication    Albuterol  (VENTOLIN IN)    CELLCEPT (BRAND) 250 MG capsule    cholecalciferol (VITAMIN D3) 25 mcg (1000 units) capsule    citalopram (CELEXA) 20 MG tablet    empagliflozin (JARDIANCE) 10 MG TABS tablet    fluticasone (VERAMYST) 27.5 MCG/SPRAY spray    Fluticasone-Salmeterol (ADVAIR DISKUS IN)    glimepiride (AMARYL) 1 MG tablet    levothyroxine (SYNTHROID/LEVOTHROID) 50 MCG tablet    mometasone (NASONEX) 50 MCG/ACT nasal spray    mometasone-formoterol (DULERA) 200-5 MCG/ACT inhaler    mycophenolate (GENERIC EQUIVALENT) 250 MG capsule    omeprazole (PRILOSEC) 20 MG CR capsule    predniSONE (DELTASONE) 5 MG tablet    PROGRAF (BRAND) 0.5 MG capsule    PROGRAF (BRAND) 1 MG capsule    simvastatin (ZOCOR) 20 MG tablet    tacrolimus (GENERIC EQUIVALENT) 0.5 MG capsule    tacrolimus (GENERIC EQUIVALENT) 1 MG capsule     No current facility-administered medications for this visit.         Physical Exams     There were no vitals taken for this visit.  Wt Readings from Last 3 Encounters:   08/25/23 78 kg (172 lb)   12/22/22 79.4 kg (175 lb)   10/15/21 88.1 kg (194 lb 4.8 oz)     General: NAD; H&N: no mucosal lesions; Lungs clear; Heart RRR; Abdomen; Soft, No organomegaly; Extremities: No edema; Skin: No rash; Neuro: Nonfocal; Mood/Affect: appropriate; Lymph: no LAD      Assessment and Plan     69 yo F with PMH of renal transplant 8/16/2007, h/o PE 2015, HTN, asthma, esophageal reflux, and MGUS.     #MGUS  #H/o renal transplant 8/2007  #H/o PE 2015    The pt has a h/o MGUS from 2021. She has been detected two M protein, IgG lambda and IgA kappa type. She has had NL range Hb and Ca levels, and her Scr has ranged around 1.0-1.2. Bone survey done in 2021 was unremarkable. Her M protein were initially 0.5 and 0.1, and her M peak has been 0.1 recently.     The above plan was discussed with Dr. Glass, who agrees with the assessment and plan.     Thank you for allowing me to participate in the care of this patient. Please do not hesitate to  contact me if there are any concerns or questions.     Cody Kilgore MD  Hem/onc fellow  Division of Hematology, Oncology, and Transplantation  [facility]    HEMATOLOGIC MALIGNANCY ATTENDING ATTESTATION     I, Santino Glass MD have seen and personally evaluated the patient Eliana Jane as part of a shared visit. I have reviewed and discussed with the fellow their history, physical and plan. I personally reviewed the vital signs, medications, labs, and imaging. I personally provided a substantive portion of the care for this patient and performed the medical decision making. I agree with their assessment and plan, with any exceptions noted in my documentation below.     Ms. Jane is a 68 year old woman new to my practice, with a history of low-risk MGUS and a history of kidney transplant in 2007, stable on long-term immunosuppression. Her M-spike has waxed and waned, lately downtrending to 0.1, with a normal FLC ratio. Bone survey in 2021 was unremarkable, Creatinine stable, no indication of anemia or bony pain. Will monitor every 6 months.     Santino Glass MD, PhD  [facility]  Division of Hematology, Oncology, and Transplantation

## 2024-03-15 ENCOUNTER — PATIENT OUTREACH (OUTPATIENT)
Dept: ONCOLOGY | Facility: HOSPITAL | Age: 69
End: 2024-03-15
Payer: COMMERCIAL

## 2024-03-15 NOTE — PROGRESS NOTES
Phoned pt per request: No answer and unable to leave message.         Oncology Distress Screening      Row Name 03/13/24 1017       How concerned do you feel regarding the following common issues people with cancer face. Please answer with a number from 0-10 where 0=not concerned and 10=very concerned. PT response of >=8  will result in outreach from Support Team.   1. How concerned are you about your ability to eat? 0       2. How concerned are you about unintended weight loss or your current weight? 0       3. How concerned are you about feeling depressed or very sad? 5       4. How concerned are you about feeling anxious or very scared? 5       5. Do you struggle with the loss of meaning and lesli in your life? Somewhat       6. How concerned are you about work and home life issues that may be affected by your cancer? 0       7. How concerned are you about knowing what resources are available to help you? 0       8. Do you currently have what you would describe as Voodoo or spiritual struggles? Not at all       You can also ask to be contacted by one of our Oncology Supportive Care professionals.   9. If you want to be contacted by one of our professionals, I can send a message to them right now. Oncology Psychologist  Pt would like to discuss recent loss of spouse with psychologist.

## 2024-03-16 ENCOUNTER — LAB (OUTPATIENT)
Dept: LAB | Facility: CLINIC | Age: 69
End: 2024-03-16
Payer: COMMERCIAL

## 2024-03-16 DIAGNOSIS — Z48.298 AFTERCARE FOLLOWING ORGAN TRANSPLANT: ICD-10-CM

## 2024-03-16 DIAGNOSIS — Z94.0 KIDNEY TRANSPLANTED: ICD-10-CM

## 2024-03-16 LAB
ANION GAP SERPL CALCULATED.3IONS-SCNC: 10 MMOL/L (ref 7–15)
BUN SERPL-MCNC: 30.8 MG/DL (ref 8–23)
CALCIUM SERPL-MCNC: 9.8 MG/DL (ref 8.8–10.2)
CHLORIDE SERPL-SCNC: 108 MMOL/L (ref 98–107)
CREAT SERPL-MCNC: 1.54 MG/DL (ref 0.51–0.95)
DEPRECATED HCO3 PLAS-SCNC: 21 MMOL/L (ref 22–29)
EGFRCR SERPLBLD CKD-EPI 2021: 36 ML/MIN/1.73M2
GLUCOSE SERPL-MCNC: 150 MG/DL (ref 70–99)
POTASSIUM SERPL-SCNC: 5.1 MMOL/L (ref 3.4–5.3)
SODIUM SERPL-SCNC: 139 MMOL/L (ref 135–145)

## 2024-03-16 PROCEDURE — 80048 BASIC METABOLIC PNL TOTAL CA: CPT

## 2024-03-16 PROCEDURE — 36415 COLL VENOUS BLD VENIPUNCTURE: CPT

## 2024-03-16 PROCEDURE — 80197 ASSAY OF TACROLIMUS: CPT

## 2024-03-17 LAB
TACROLIMUS BLD-MCNC: 8.8 UG/L (ref 5–15)
TME LAST DOSE: NORMAL H
TME LAST DOSE: NORMAL H

## 2024-03-18 ENCOUNTER — TELEPHONE (OUTPATIENT)
Dept: TRANSPLANT | Facility: CLINIC | Age: 69
End: 2024-03-18
Payer: COMMERCIAL

## 2024-03-18 DIAGNOSIS — D84.9 IMMUNOSUPPRESSION (H): Primary | ICD-10-CM

## 2024-03-18 DIAGNOSIS — Z94.0 KIDNEY TRANSPLANTED: ICD-10-CM

## 2024-03-18 RX ORDER — TACROLIMUS 0.5 MG/1
0.5 CAPSULE ORAL 2 TIMES DAILY
Qty: 180 CAPSULE | Refills: 3 | Status: SHIPPED | OUTPATIENT
Start: 2024-03-18 | End: 2024-04-24

## 2024-03-18 RX ORDER — TACROLIMUS 1 MG/1
CAPSULE ORAL
Qty: 180 CAPSULE | Refills: 3 | Status: SHIPPED | OUTPATIENT
Start: 2024-03-18 | End: 2024-04-24

## 2024-03-18 NOTE — TELEPHONE ENCOUNTER
ISSUE  Creatinine elevated above baseline at 1.54    PLAN  Call patient and assess hydration status, recent illness, changes to medications, recent blood pressures, pain over graft site.     OUTCOME  Patient reports no recent illness, changes to medications, UTI symptoms or pain over graft site. Unsure of BP's- does not have a BP cuff at home. Reports this is likely due to hydration status. She will push fluids and repeat in 1-2 weeks.     ISSUE:   Tacrolimus IR level 8.8 on 3/16, goal 4-6, dose 2 mg in AM/ 1.5mg in PM.    PLAN:   Call Patientand confirm this was an accurate 12-hour trough.   Verify Tacrolimus IR dose 2.5 mg in AM/ 1.5 mg in PM.   Confirm no new medications or illness.   Confirm no missed doses.   If accurate trough and accurate dose, decrease Tacrolimus IR dose to 1.5 mg BID     Is this more than a 50% increase or decrease in current IS dose: No  If YES, justification: n/a    Repeat labs in 1-2 weeks.  *If > 50% change in immunosuppression dose, repeat labs in 1 week.     OUTCOME:   Spoke with Patient, they confirm accurate trough level and current dose 2 mg in AM/ 1.5mg in PM.Patientconfirmed dose change to 1.5 mg BID.  Patientagreed to repeat labs in 1-2 weeks.   Orders sent to preferred pharmacy for dose change and lab for repeat labs.   Patientvoiced understanding of plan.

## 2024-04-23 ENCOUNTER — LAB (OUTPATIENT)
Dept: LAB | Facility: CLINIC | Age: 69
End: 2024-04-23
Payer: COMMERCIAL

## 2024-04-23 DIAGNOSIS — D84.9 IMMUNOSUPPRESSION (H): ICD-10-CM

## 2024-04-23 DIAGNOSIS — Z94.0 KIDNEY TRANSPLANTED: ICD-10-CM

## 2024-04-23 LAB
ANION GAP SERPL CALCULATED.3IONS-SCNC: 9 MMOL/L (ref 7–15)
BUN SERPL-MCNC: 27.5 MG/DL (ref 8–23)
CALCIUM SERPL-MCNC: 9.3 MG/DL (ref 8.8–10.2)
CHLORIDE SERPL-SCNC: 112 MMOL/L (ref 98–107)
CREAT SERPL-MCNC: 1.57 MG/DL (ref 0.51–0.95)
DEPRECATED HCO3 PLAS-SCNC: 20 MMOL/L (ref 22–29)
EGFRCR SERPLBLD CKD-EPI 2021: 35 ML/MIN/1.73M2
ERYTHROCYTE [DISTWIDTH] IN BLOOD BY AUTOMATED COUNT: 11.8 % (ref 10–15)
GLUCOSE SERPL-MCNC: 214 MG/DL (ref 70–99)
HCT VFR BLD AUTO: 35.4 % (ref 35–47)
HGB BLD-MCNC: 11.6 G/DL (ref 11.7–15.7)
MCH RBC QN AUTO: 32.8 PG (ref 26.5–33)
MCHC RBC AUTO-ENTMCNC: 32.8 G/DL (ref 31.5–36.5)
MCV RBC AUTO: 100 FL (ref 78–100)
PLATELET # BLD AUTO: 180 10E3/UL (ref 150–450)
POTASSIUM SERPL-SCNC: 5.5 MMOL/L (ref 3.4–5.3)
RBC # BLD AUTO: 3.54 10E6/UL (ref 3.8–5.2)
SODIUM SERPL-SCNC: 141 MMOL/L (ref 135–145)
TACROLIMUS BLD-MCNC: 11.4 UG/L (ref 5–15)
TME LAST DOSE: NORMAL H
TME LAST DOSE: NORMAL H
WBC # BLD AUTO: 5.1 10E3/UL (ref 4–11)

## 2024-04-23 PROCEDURE — 80197 ASSAY OF TACROLIMUS: CPT

## 2024-04-23 PROCEDURE — 36415 COLL VENOUS BLD VENIPUNCTURE: CPT

## 2024-04-23 PROCEDURE — 80048 BASIC METABOLIC PNL TOTAL CA: CPT

## 2024-04-23 PROCEDURE — 85027 COMPLETE CBC AUTOMATED: CPT

## 2024-04-24 ENCOUNTER — TELEPHONE (OUTPATIENT)
Dept: TRANSPLANT | Facility: CLINIC | Age: 69
End: 2024-04-24
Payer: COMMERCIAL

## 2024-04-24 DIAGNOSIS — D84.9 IMMUNOSUPPRESSED STATUS (H): ICD-10-CM

## 2024-04-24 DIAGNOSIS — Z94.0 KIDNEY TRANSPLANT RECIPIENT: ICD-10-CM

## 2024-04-24 DIAGNOSIS — Z48.22 ENCOUNTER FOR AFTERCARE FOLLOWING KIDNEY TRANSPLANT: Primary | ICD-10-CM

## 2024-04-24 DIAGNOSIS — Z94.0 KIDNEY TRANSPLANTED: ICD-10-CM

## 2024-04-24 RX ORDER — TACROLIMUS 0.5 MG/1
0.5 CAPSULE ORAL 2 TIMES DAILY
Qty: 180 CAPSULE | Refills: 3 | Status: SHIPPED | OUTPATIENT
Start: 2024-04-24

## 2024-04-24 RX ORDER — TACROLIMUS 1 MG/1
1 CAPSULE ORAL 2 TIMES DAILY
Qty: 180 CAPSULE | Refills: 3 | Status: SHIPPED | OUTPATIENT
Start: 2024-04-24

## 2024-04-24 NOTE — TELEPHONE ENCOUNTER
ISSUE  Creatinine elevated above baseline at 1.57. Tacrolimus level above goal 4-6.    PLAN  Call patient and assess hydration status, recent illness, changes to medications, recent blood pressures, pain over graft site.     Assess accuracy of level.    OUTCOME  Eliana reports that she has been ill with a GI illness with significant diarrhea and vomiting that lasted ~1 week. She was seen by primary care. This has now resolved, but she feels she is still dehydrated from her illness. Discussed fluid needs and encouraged 80 oz water daily. Offered IVF and patient declined. Plan for repeat labs early next week- orders updated. Encouraged to call back if symptoms return for further work up.       ISSUE:   Tacrolimus IR level 11.4 on 04/23/24, goal 5, dose 1.5 mg BID.    PLAN:   Call Patient and confirm this was an accurate 12-hour trough.   Verify Tacrolimus IR dose 1.5 mg BID.   Confirm no new medications or illness.   Confirm no missed doses.     If accurate trough and accurate dose, decrease Tacrolimus IR dose to 1 mg BID  *Recommended dose rounded from calculated dose 0.66 mg  BID.      Repeat labs in 7 days.   For any dose change <50%, recheck labs per guideline or within 1 month.  For any dose change of more than 50%, recheck drug level based on timing to reach steady state:   Immediate release tacrolimus: 2-3 days  Extended-release tacrolimus: 7 days  Cyclosporine: 2 days  Sirolimus: 12 days  Everolimus: 8 days      OUTCOME:   Spoke with Patient, they confirm accurate trough level and current dose 1.5 mg BID.   Patient confirmed dose change to 1 mg BID.  Patient agreed to repeat labs in 7 days.   Orders sent to preferred pharmacy for dose change and lab for repeat labs.   Patient voiced understanding of plan.

## 2024-05-21 DIAGNOSIS — Z94.0 KIDNEY TRANSPLANTED: ICD-10-CM

## 2024-05-21 RX ORDER — MYCOPHENOLATE MOFETIL 250 MG/1
500 CAPSULE ORAL 2 TIMES DAILY
Qty: 360 CAPSULE | Refills: 3 | Status: SHIPPED | OUTPATIENT
Start: 2024-05-21

## 2024-05-31 ENCOUNTER — TELEPHONE (OUTPATIENT)
Dept: TRANSPLANT | Facility: CLINIC | Age: 69
End: 2024-05-31

## 2024-05-31 NOTE — TELEPHONE ENCOUNTER
Pt has been sick off on for past month  was going to get labs but hen she woul get sick and decided to wait  till felt better- Has been on Prednisone 20 mg bid for bronchitis  is scheduled for labs tomorrow  should she still do labs or wait?

## 2024-06-01 ENCOUNTER — LAB (OUTPATIENT)
Dept: LAB | Facility: CLINIC | Age: 69
End: 2024-06-01
Payer: COMMERCIAL

## 2024-06-01 DIAGNOSIS — D84.9 IMMUNOSUPPRESSED STATUS (H): ICD-10-CM

## 2024-06-01 DIAGNOSIS — Z48.22 ENCOUNTER FOR AFTERCARE FOLLOWING KIDNEY TRANSPLANT: ICD-10-CM

## 2024-06-01 DIAGNOSIS — Z94.0 KIDNEY TRANSPLANT RECIPIENT: ICD-10-CM

## 2024-06-01 LAB
ERYTHROCYTE [DISTWIDTH] IN BLOOD BY AUTOMATED COUNT: 12.3 % (ref 10–15)
HCT VFR BLD AUTO: 40.3 % (ref 35–47)
HGB BLD-MCNC: 13.5 G/DL (ref 11.7–15.7)
MCH RBC QN AUTO: 33.1 PG (ref 26.5–33)
MCHC RBC AUTO-ENTMCNC: 33.5 G/DL (ref 31.5–36.5)
MCV RBC AUTO: 99 FL (ref 78–100)
PLATELET # BLD AUTO: 212 10E3/UL (ref 150–450)
RBC # BLD AUTO: 4.08 10E6/UL (ref 3.8–5.2)
WBC # BLD AUTO: 11.4 10E3/UL (ref 4–11)

## 2024-06-01 PROCEDURE — 85027 COMPLETE CBC AUTOMATED: CPT

## 2024-06-01 PROCEDURE — 80197 ASSAY OF TACROLIMUS: CPT

## 2024-06-01 PROCEDURE — 36415 COLL VENOUS BLD VENIPUNCTURE: CPT

## 2024-06-01 PROCEDURE — 80048 BASIC METABOLIC PNL TOTAL CA: CPT

## 2024-06-02 LAB
ANION GAP SERPL CALCULATED.3IONS-SCNC: 10 MMOL/L (ref 7–15)
BUN SERPL-MCNC: 29.5 MG/DL (ref 8–23)
CALCIUM SERPL-MCNC: 9.8 MG/DL (ref 8.8–10.2)
CHLORIDE SERPL-SCNC: 106 MMOL/L (ref 98–107)
CREAT SERPL-MCNC: 1.3 MG/DL (ref 0.51–0.95)
DEPRECATED HCO3 PLAS-SCNC: 22 MMOL/L (ref 22–29)
EGFRCR SERPLBLD CKD-EPI 2021: 44 ML/MIN/1.73M2
GLUCOSE SERPL-MCNC: 86 MG/DL (ref 70–99)
POTASSIUM SERPL-SCNC: 4.2 MMOL/L (ref 3.4–5.3)
SODIUM SERPL-SCNC: 138 MMOL/L (ref 135–145)
TACROLIMUS BLD-MCNC: 4.1 UG/L (ref 5–15)
TME LAST DOSE: ABNORMAL H
TME LAST DOSE: ABNORMAL H

## 2024-06-07 ENCOUNTER — TELEPHONE (OUTPATIENT)
Dept: TRANSPLANT | Facility: CLINIC | Age: 69
End: 2024-06-07
Payer: COMMERCIAL

## 2024-06-07 DIAGNOSIS — Z94.0 KIDNEY TRANSPLANT RECIPIENT: ICD-10-CM

## 2024-06-07 DIAGNOSIS — Z48.22 ENCOUNTER FOR AFTERCARE FOLLOWING KIDNEY TRANSPLANT: Primary | ICD-10-CM

## 2024-06-07 DIAGNOSIS — D84.9 IMMUNOSUPPRESSED STATUS (H): ICD-10-CM

## 2024-06-07 NOTE — TELEPHONE ENCOUNTER
Spoke with Eliana about her recent labs. Creatinine and hemoglobin are back to baseline, her WBC is elevated, but she is also on prednisone for allergies.     Plan to repeat in 1 month. Orders updated.

## 2024-06-07 NOTE — TELEPHONE ENCOUNTER
General  Route to LPN    Reason for call: Patient calling in regards to lab results and questions for Holly DAVE. She had labs drawn last Saturday and is needing to discuss with care team urgently.    Call back needed? Yes    Return Call Needed  Same as documented in contacts section  When to return call?: Same day: Route High Priority

## 2024-06-27 DIAGNOSIS — Z94.0 KIDNEY TRANSPLANTED: Primary | ICD-10-CM

## 2024-06-27 RX ORDER — PREDNISONE 5 MG/1
5 TABLET ORAL DAILY
Qty: 90 TABLET | Refills: 3 | Status: SHIPPED | OUTPATIENT
Start: 2024-06-27

## 2024-08-05 ENCOUNTER — TELEPHONE (OUTPATIENT)
Dept: TRANSPLANT | Facility: CLINIC | Age: 69
End: 2024-08-05
Payer: COMMERCIAL

## 2024-08-05 NOTE — TELEPHONE ENCOUNTER
LPN TASK:  Please call pt and let her know that Hgb A1c should be ordered by her PCP so that results are properly routed to them for management. It looks like she uses [facility] lab so she could ask her PCP to fax orders to their lab. Fax number below and can be provided to pt.   [facility]   Phone: [phone]   Fax: [phone]

## 2024-08-06 NOTE — TELEPHONE ENCOUNTER
Call placed to patient. No answer. Voice message left with instructions listed below.        LPN TASK:  Please call pt and let her know that Hgb A1c should be ordered by her PCP so that results are properly routed to them for management. It looks like she uses [facility] lab so she could ask her PCP to fax orders to their lab. Fax number below and can be provided to pt.

## 2024-08-08 ENCOUNTER — TELEPHONE (OUTPATIENT)
Dept: TRANSPLANT | Facility: CLINIC | Age: 69
End: 2024-08-08
Payer: COMMERCIAL

## 2024-08-08 DIAGNOSIS — E11.9 DIABETES MELLITUS (H): ICD-10-CM

## 2024-08-08 DIAGNOSIS — Z94.0 KIDNEY TRANSPLANTED: Primary | ICD-10-CM

## 2024-08-08 NOTE — TELEPHONE ENCOUNTER
Patient Call: General  Route to LPN    Reason for call: Pt called requesting A1C be added to her upcoming labs. She is concerned that her PCP may not do it.    Call back needed? Yes    Return Call Needed  Same as documented in contacts section  When to return call?: Same day: Route High Priority

## 2024-08-29 ENCOUNTER — LAB (OUTPATIENT)
Dept: LAB | Facility: CLINIC | Age: 69
End: 2024-08-29
Payer: COMMERCIAL

## 2024-08-29 DIAGNOSIS — D84.9 IMMUNOSUPPRESSED STATUS (H): ICD-10-CM

## 2024-08-29 DIAGNOSIS — Z48.22 ENCOUNTER FOR AFTERCARE FOLLOWING KIDNEY TRANSPLANT: ICD-10-CM

## 2024-08-29 DIAGNOSIS — Z94.0 KIDNEY TRANSPLANTED: ICD-10-CM

## 2024-08-29 DIAGNOSIS — Z94.0 KIDNEY TRANSPLANT RECIPIENT: ICD-10-CM

## 2024-08-29 DIAGNOSIS — E11.9 DIABETES MELLITUS (H): ICD-10-CM

## 2024-08-29 LAB
ANION GAP SERPL CALCULATED.3IONS-SCNC: 10 MMOL/L (ref 7–15)
BASOPHILS # BLD AUTO: 0 10E3/UL (ref 0–0.2)
BASOPHILS NFR BLD AUTO: 0 %
BUN SERPL-MCNC: 19.1 MG/DL (ref 8–23)
CALCIUM SERPL-MCNC: 9.9 MG/DL (ref 8.8–10.4)
CHLORIDE SERPL-SCNC: 109 MMOL/L (ref 98–107)
CREAT SERPL-MCNC: 1.18 MG/DL (ref 0.51–0.95)
EGFRCR SERPLBLD CKD-EPI 2021: 50 ML/MIN/1.73M2
EOSINOPHIL # BLD AUTO: 0.1 10E3/UL (ref 0–0.7)
EOSINOPHIL NFR BLD AUTO: 2 %
ERYTHROCYTE [DISTWIDTH] IN BLOOD BY AUTOMATED COUNT: 11.7 % (ref 10–15)
GLUCOSE SERPL-MCNC: 150 MG/DL (ref 70–99)
HBA1C MFR BLD: 6.2 % (ref 0–5.6)
HCO3 SERPL-SCNC: 20 MMOL/L (ref 22–29)
HCT VFR BLD AUTO: 41.2 % (ref 35–47)
HGB BLD-MCNC: 13.4 G/DL (ref 11.7–15.7)
IMM GRANULOCYTES # BLD: 0 10E3/UL
IMM GRANULOCYTES NFR BLD: 1 %
LYMPHOCYTES # BLD AUTO: 1.9 10E3/UL (ref 0.8–5.3)
LYMPHOCYTES NFR BLD AUTO: 32 %
MCH RBC QN AUTO: 32.4 PG (ref 26.5–33)
MCHC RBC AUTO-ENTMCNC: 32.5 G/DL (ref 31.5–36.5)
MCV RBC AUTO: 100 FL (ref 78–100)
MONOCYTES # BLD AUTO: 0.4 10E3/UL (ref 0–1.3)
MONOCYTES NFR BLD AUTO: 7 %
NEUTROPHILS # BLD AUTO: 3.6 10E3/UL (ref 1.6–8.3)
NEUTROPHILS NFR BLD AUTO: 59 %
PLATELET # BLD AUTO: 235 10E3/UL (ref 150–450)
POTASSIUM SERPL-SCNC: 4.7 MMOL/L (ref 3.4–5.3)
RBC # BLD AUTO: 4.14 10E6/UL (ref 3.8–5.2)
SODIUM SERPL-SCNC: 139 MMOL/L (ref 135–145)
TACROLIMUS BLD-MCNC: 5.1 UG/L (ref 5–15)
TME LAST DOSE: NORMAL H
TME LAST DOSE: NORMAL H
WBC # BLD AUTO: 6.1 10E3/UL (ref 4–11)

## 2024-08-29 PROCEDURE — 80197 ASSAY OF TACROLIMUS: CPT

## 2024-08-29 PROCEDURE — 85025 COMPLETE CBC W/AUTO DIFF WBC: CPT

## 2024-08-29 PROCEDURE — 36415 COLL VENOUS BLD VENIPUNCTURE: CPT

## 2024-08-29 PROCEDURE — 83036 HEMOGLOBIN GLYCOSYLATED A1C: CPT

## 2024-08-29 PROCEDURE — 80048 BASIC METABOLIC PNL TOTAL CA: CPT

## 2024-08-30 ENCOUNTER — TELEPHONE (OUTPATIENT)
Dept: TRANSPLANT | Facility: CLINIC | Age: 69
End: 2024-08-30
Payer: COMMERCIAL

## 2024-08-30 NOTE — TELEPHONE ENCOUNTER
Patient Call: General  Route to LPN    Reason for call: Pt called to speak to coordinator regarding recent labs    Call back needed? Yes    Return Call Needed  Same as documented in contacts section  When to return call?: Same day: Route High Priority

## 2024-09-18 DIAGNOSIS — E78.5 HYPERLIPIDEMIA: ICD-10-CM

## 2024-09-18 RX ORDER — SIMVASTATIN 20 MG
20 TABLET ORAL EVERY MORNING
Qty: 90 TABLET | Refills: 0 | Status: SHIPPED | OUTPATIENT
Start: 2024-09-18

## 2024-10-10 DIAGNOSIS — D47.2 MGUS (MONOCLONAL GAMMOPATHY OF UNKNOWN SIGNIFICANCE): Primary | ICD-10-CM

## 2024-11-01 PROBLEM — C44.92 SCC (SQUAMOUS CELL CARCINOMA): Status: ACTIVE | Noted: 2024-05-01

## 2025-01-07 ENCOUNTER — PATIENT OUTREACH (OUTPATIENT)
Dept: CARE COORDINATION | Facility: CLINIC | Age: 70
End: 2025-01-07

## 2025-01-15 ENCOUNTER — TELEPHONE (OUTPATIENT)
Dept: TRANSPLANT | Facility: CLINIC | Age: 70
End: 2025-01-15
Payer: COMMERCIAL

## 2025-01-15 NOTE — TELEPHONE ENCOUNTER
LVM for patient that levothyroxine was last prescribed by Dr. Rodarte through [facility]. It was not refilled due to lack of [facility] labs. Requested that patient follow up with Dr. Rodarte's office.

## 2025-01-15 NOTE — TELEPHONE ENCOUNTER
Please call Eliana;  She is asking why she is not taking levothyroxine.  She was asked by her eye doctor.  ( Has bulging eye syndrome).

## 2025-01-16 ENCOUNTER — LAB (OUTPATIENT)
Dept: LAB | Facility: CLINIC | Age: 70
End: 2025-01-16
Payer: COMMERCIAL

## 2025-01-16 ENCOUNTER — TELEPHONE (OUTPATIENT)
Dept: TRANSPLANT | Facility: CLINIC | Age: 70
End: 2025-01-16

## 2025-01-16 DIAGNOSIS — Z94.0 KIDNEY REPLACED BY TRANSPLANT: ICD-10-CM

## 2025-01-16 DIAGNOSIS — Z98.890 OTHER SPECIFIED POSTPROCEDURAL STATES: ICD-10-CM

## 2025-01-16 DIAGNOSIS — Z20.828 CONTACT WITH AND (SUSPECTED) EXPOSURE TO OTHER VIRAL COMMUNICABLE DISEASES: ICD-10-CM

## 2025-01-16 DIAGNOSIS — Z79.899 ENCOUNTER FOR LONG-TERM CURRENT USE OF MEDICATION: ICD-10-CM

## 2025-01-16 DIAGNOSIS — Z48.298 AFTERCARE FOLLOWING ORGAN TRANSPLANT: Primary | ICD-10-CM

## 2025-01-16 DIAGNOSIS — Z48.298 AFTERCARE FOLLOWING ORGAN TRANSPLANT: ICD-10-CM

## 2025-01-16 DIAGNOSIS — D47.2 MGUS (MONOCLONAL GAMMOPATHY OF UNKNOWN SIGNIFICANCE): ICD-10-CM

## 2025-01-16 LAB
BASOPHILS # BLD AUTO: 0 10E3/UL (ref 0–0.2)
BASOPHILS NFR BLD AUTO: 0 %
EOSINOPHIL # BLD AUTO: 0.1 10E3/UL (ref 0–0.7)
EOSINOPHIL NFR BLD AUTO: 2 %
ERYTHROCYTE [DISTWIDTH] IN BLOOD BY AUTOMATED COUNT: 12.4 % (ref 10–15)
ERYTHROCYTE [DISTWIDTH] IN BLOOD BY AUTOMATED COUNT: 12.5 % (ref 10–15)
HCT VFR BLD AUTO: 37.4 % (ref 35–47)
HCT VFR BLD AUTO: 37.5 % (ref 35–47)
HGB BLD-MCNC: 12.6 G/DL (ref 11.7–15.7)
HGB BLD-MCNC: 12.6 G/DL (ref 11.7–15.7)
HOLD SPECIMEN: NORMAL
IMM GRANULOCYTES # BLD: 0 10E3/UL
IMM GRANULOCYTES NFR BLD: 0 %
LYMPHOCYTES # BLD AUTO: 1.6 10E3/UL (ref 0.8–5.3)
LYMPHOCYTES NFR BLD AUTO: 34 %
MCH RBC QN AUTO: 33.2 PG (ref 26.5–33)
MCH RBC QN AUTO: 33.2 PG (ref 26.5–33)
MCHC RBC AUTO-ENTMCNC: 33.6 G/DL (ref 31.5–36.5)
MCHC RBC AUTO-ENTMCNC: 33.7 G/DL (ref 31.5–36.5)
MCV RBC AUTO: 98 FL (ref 78–100)
MCV RBC AUTO: 99 FL (ref 78–100)
MONOCYTES # BLD AUTO: 0.4 10E3/UL (ref 0–1.3)
MONOCYTES NFR BLD AUTO: 8 %
NEUTROPHILS # BLD AUTO: 2.7 10E3/UL (ref 1.6–8.3)
NEUTROPHILS NFR BLD AUTO: 56 %
PLATELET # BLD AUTO: 242 10E3/UL (ref 150–450)
PLATELET # BLD AUTO: 245 10E3/UL (ref 150–450)
RBC # BLD AUTO: 3.8 10E6/UL (ref 3.8–5.2)
RBC # BLD AUTO: 3.8 10E6/UL (ref 3.8–5.2)
TACROLIMUS BLD-MCNC: 5 UG/L (ref 5–15)
TME LAST DOSE: NORMAL H
TME LAST DOSE: NORMAL H
WBC # BLD AUTO: 4.9 10E3/UL (ref 4–11)
WBC # BLD AUTO: 5 10E3/UL (ref 4–11)

## 2025-01-16 NOTE — TELEPHONE ENCOUNTER
Provider Voice mail- 1/16/2025  1145  R  Provider Call: Transplant Lab/Orders  Route to LPN  Post Transplant Days: [phone]  When patient is less than 60 days post-transplant, route high priority  Reason for Call: Annual lab reorder  Liver patients reporting abnormal lab results: Route to RN and Page  Document lab facility information when provider is calling about annual lab orders. Delete facility wildcards when not needed.  Facility Name: [facility]  Facility Location: [place]  Outside Facility Fax Number: Place orders in EPIC  they natasha labs and obained an urine  sample  Callback needed? No

## 2025-01-16 NOTE — LETTER
OUTPATIENT LABORATORY TEST ORDER     Patient Name: Eliana Jane   YOB: 1955     [facility] MR# [if applicable]: [id number]   Date & Time: January 16, 2025  12:29 PM  Expiration Date: 1 year after date issued      Diagnoses: Kidney Transplant (ICD-10 Z94.0)   Long term use of medications (ICD-10 Z79.899)    Other specified postprocedural states (Z98.890)     We ask your assistance in obtaining the following laboratory tests, which are part of our routine surveillance program for Solid Organ Transplant patients.     Please fax each result to [phone], same day as resulted/available    Critical lab results page [phone]    Every 3 months  CBC with platelets  Basic Metabolic Panel (Sodium, Potassium, Chloride, Creatinine, CO2, Urea Nitrogen, glucose, Calcium)  Tacrolimus drug level - 12-hour trough, please document time of last dose      Yearly  Urine for protein/creatinine       If you have any questions, please call [facility] [phone] or [phone], Fax [phone].    .

## 2025-01-20 DIAGNOSIS — E78.5 HYPERLIPIDEMIA: ICD-10-CM

## 2025-01-20 RX ORDER — SIMVASTATIN 20 MG
20 TABLET ORAL EVERY MORNING
Qty: 90 TABLET | Refills: 0 | Status: SHIPPED | OUTPATIENT
Start: 2025-01-20

## 2025-01-21 ENCOUNTER — PATIENT OUTREACH (OUTPATIENT)
Dept: CARE COORDINATION | Facility: CLINIC | Age: 70
End: 2025-01-21
Payer: COMMERCIAL

## 2025-03-27 DIAGNOSIS — D47.2 MONOCLONAL GAMMOPATHY OF UNKNOWN SIGNIFICANCE: Primary | ICD-10-CM

## 2025-04-13 ENCOUNTER — LAB (OUTPATIENT)
Dept: LAB | Facility: CLINIC | Age: 70
End: 2025-04-13
Payer: COMMERCIAL

## 2025-04-13 DIAGNOSIS — D47.2 MONOCLONAL GAMMOPATHY OF UNKNOWN SIGNIFICANCE: ICD-10-CM

## 2025-04-13 LAB
ALBUMIN SERPL BCG-MCNC: 4.2 G/DL (ref 3.5–5.2)
ALP SERPL-CCNC: 149 U/L (ref 40–150)
ALT SERPL W P-5'-P-CCNC: 8 U/L (ref 0–50)
ANION GAP SERPL CALCULATED.3IONS-SCNC: 10 MMOL/L (ref 7–15)
AST SERPL W P-5'-P-CCNC: 16 U/L (ref 0–45)
BASOPHILS # BLD AUTO: 0 10E3/UL (ref 0–0.2)
BASOPHILS NFR BLD AUTO: 0 %
BILIRUB SERPL-MCNC: 0.4 MG/DL
BUN SERPL-MCNC: 13.9 MG/DL (ref 8–23)
CALCIUM SERPL-MCNC: 10 MG/DL (ref 8.8–10.4)
CHLORIDE SERPL-SCNC: 105 MMOL/L (ref 98–107)
CREAT SERPL-MCNC: 1.19 MG/DL (ref 0.51–0.95)
EGFRCR SERPLBLD CKD-EPI 2021: 49 ML/MIN/1.73M2
EOSINOPHIL # BLD AUTO: 0.1 10E3/UL (ref 0–0.7)
EOSINOPHIL NFR BLD AUTO: 2 %
ERYTHROCYTE [DISTWIDTH] IN BLOOD BY AUTOMATED COUNT: 11.7 % (ref 10–15)
GLUCOSE SERPL-MCNC: 95 MG/DL (ref 70–99)
HCO3 SERPL-SCNC: 24 MMOL/L (ref 22–29)
HCT VFR BLD AUTO: 40.8 % (ref 35–47)
HGB BLD-MCNC: 13.8 G/DL (ref 11.7–15.7)
IMM GRANULOCYTES # BLD: 0 10E3/UL
IMM GRANULOCYTES NFR BLD: 1 %
LDH SERPL L TO P-CCNC: 199 U/L (ref 0–250)
LYMPHOCYTES # BLD AUTO: 2.2 10E3/UL (ref 0.8–5.3)
LYMPHOCYTES NFR BLD AUTO: 34 %
MCH RBC QN AUTO: 33.2 PG (ref 26.5–33)
MCHC RBC AUTO-ENTMCNC: 33.8 G/DL (ref 31.5–36.5)
MCV RBC AUTO: 98 FL (ref 78–100)
MONOCYTES # BLD AUTO: 0.5 10E3/UL (ref 0–1.3)
MONOCYTES NFR BLD AUTO: 8 %
NEUTROPHILS # BLD AUTO: 3.6 10E3/UL (ref 1.6–8.3)
NEUTROPHILS NFR BLD AUTO: 56 %
PLATELET # BLD AUTO: 231 10E3/UL (ref 150–450)
POTASSIUM SERPL-SCNC: 4.3 MMOL/L (ref 3.4–5.3)
PROT SERPL-MCNC: 7.4 G/DL (ref 6.4–8.3)
RBC # BLD AUTO: 4.16 10E6/UL (ref 3.8–5.2)
SODIUM SERPL-SCNC: 139 MMOL/L (ref 135–145)
TOTAL PROTEIN SERUM FOR ELP: 6.9 G/DL (ref 6.4–8.3)
WBC # BLD AUTO: 6.4 10E3/UL (ref 4–11)

## 2025-04-13 PROCEDURE — 86334 IMMUNOFIX E-PHORESIS SERUM: CPT | Performed by: PATHOLOGY

## 2025-04-13 PROCEDURE — 36415 COLL VENOUS BLD VENIPUNCTURE: CPT

## 2025-04-13 PROCEDURE — 80053 COMPREHEN METABOLIC PANEL: CPT | Mod: 59

## 2025-04-13 PROCEDURE — 85025 COMPLETE CBC W/AUTO DIFF WBC: CPT

## 2025-04-13 PROCEDURE — 83521 IG LIGHT CHAINS FREE EACH: CPT

## 2025-04-13 PROCEDURE — 84155 ASSAY OF PROTEIN SERUM: CPT

## 2025-04-13 PROCEDURE — 84165 PROTEIN E-PHORESIS SERUM: CPT | Performed by: PATHOLOGY

## 2025-04-13 PROCEDURE — 83615 LACTATE (LD) (LDH) ENZYME: CPT

## 2025-04-14 LAB
KAPPA LC FREE SER-MCNC: 3.18 MG/DL (ref 0.33–1.94)
KAPPA LC FREE/LAMBDA FREE SER NEPH: 1.75 {RATIO} (ref 0.26–1.65)
LAMBDA LC FREE SERPL-MCNC: 1.82 MG/DL (ref 0.57–2.63)

## 2025-04-15 LAB
ALBUMIN SERPL ELPH-MCNC: 3.9 G/DL (ref 3.7–5.1)
ALPHA1 GLOB SERPL ELPH-MCNC: 0.3 G/DL (ref 0.2–0.4)
ALPHA2 GLOB SERPL ELPH-MCNC: 0.9 G/DL (ref 0.5–0.9)
B-GLOBULIN SERPL ELPH-MCNC: 0.7 G/DL (ref 0.6–1)
GAMMA GLOB SERPL ELPH-MCNC: 1.1 G/DL (ref 0.7–1.6)
LOCATION OF TASK: ABNORMAL
LOCATION OF TASK: NORMAL
M PROTEIN SERPL ELPH-MCNC: 0.6 G/DL
PROT PATTERN SERPL ELPH-IMP: ABNORMAL
PROT PATTERN SERPL IFE-IMP: NORMAL

## 2025-04-16 ENCOUNTER — VIRTUAL VISIT (OUTPATIENT)
Dept: ONCOLOGY | Facility: CLINIC | Age: 70
End: 2025-04-16
Attending: INTERNAL MEDICINE
Payer: COMMERCIAL

## 2025-04-16 VITALS — HEIGHT: 65 IN | BODY MASS INDEX: 28.82 KG/M2 | WEIGHT: 173 LBS

## 2025-04-16 DIAGNOSIS — E53.8 VITAMIN B12 DEFICIENCY (NON ANEMIC): ICD-10-CM

## 2025-04-16 DIAGNOSIS — D47.2 MGUS (MONOCLONAL GAMMOPATHY OF UNKNOWN SIGNIFICANCE): Primary | ICD-10-CM

## 2025-04-16 ASSESSMENT — PAIN SCALES - GENERAL: PAINLEVEL_OUTOF10: NO PAIN (0)

## 2025-04-16 NOTE — PROGRESS NOTES
Virtual Visit Details    Type of service:  Telephone Visit   Phone call duration: 10 minutes   Originating Location (pt. Location): Home    Distant Location (provider location):  On-site  Telephone visit completed due to the patient did not have access to video, while the distant provider did.      HEMATOLOGY AND ONCOLOGY  [facility]CER CLINIC  FOLLOW-UP VISIT    NAME: Eliana Jane DEBORAH: Apr 16, 2025   MRN: [id number]      DIAGNOSES:  # MGUS      Oncology History   #MGUS  Previously seen by Dr. Lamb at [facility].   9/2021: two M protein detected 0.5g and 0.1g. UPEP with    1. Monoclonal IgG immunoglobulin of lambda light chain type   2. Monoclonal IgA immunoglobulin of kappa light chain type   Light chains: Kappa 2.43, Lambda 1.83, K/L ratio 1.83   Bone survey 11/2021: No lytic or sclerotic lesions are evident       Interval History     69 year old F with PMH of renal transplant 8/16/2007 on immunosuppression, h/o PE 2015, HTN, asthma, esophageal reflux, hypothyroidism, T2IDDM, and MGUS.     The pt states that she had a h/o renal transplant in 2007. She states that the cause of her kidney failure is unknown. She takes prednisone 5mg daily and tacro BID as 2mg/1.5mg, and cellcept 250mg.     She states that she has had easy bruising. She c/o bruising that she would notice if she knock her hands against something. This was addressed by Dr. Case previously and her coags test were checked (INR, APTT, fibrinoge), which were all WNL.her bruise can be as large as one inch in size. No nosebleeds. No black/tarry stools.     No new pain, no bone pain. No fever, no night sweats. She is getting over a upper respiratory infection, but has not needed medication for it; no longer coughing. The patient does not report any SOB, CP, abd pain/n/v/d, dysuria, joint pain, rash.    She has a FH of leukemia in her maternal grandmother.     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     ROS:  Negative other than as stated in above interval history.      Current Outpatient Medications   Medication Sig Dispense Refill    Albuterol (VENTOLIN IN) Inhale 1 Inhaler into the lungs daily as needed. (Patient not taking: Reported on 8/25/2023)      cholecalciferol (VITAMIN D3) 25 mcg (1000 units) capsule Take 1 capsule (25 mcg) by mouth daily 90 capsule 3    citalopram (CELEXA) 20 MG tablet Take 2 tablets (40 mg) by mouth daily      empagliflozin (JARDIANCE) 10 MG TABS tablet Take 1 tablet (10 mg) by mouth daily (Patient not taking: Reported on 3/13/2024)      fluticasone (VERAMYST) 27.5 MCG/SPRAY spray Spray 2 sprays into both nostrils daily      Fluticasone-Salmeterol (ADVAIR DISKUS IN) Inhale 2 Inhalers into the lungs daily as needed. (Patient not taking: Reported on 8/25/2023)      glimepiride (AMARYL) 1 MG tablet Take 3 tablets (3 mg) by mouth every morning (before breakfast)      LANTUS SOLOSTAR 100 UNIT/ML soln ADMINISTER 28 UNITS UNDER THE SKIN EVERY NIGHT AT BEDTIME      levothyroxine (SYNTHROID/LEVOTHROID) 50 MCG tablet Take 1 tablet (50 mcg) by mouth daily      mometasone (NASONEX) 50 MCG/ACT nasal spray Spray 2 sprays into both nostrils daily Reported on 4/7/2017 (Patient not taking: Reported on 12/22/2022)      mometasone-formoterol (DULERA) 200-5 MCG/ACT inhaler Inhale 1 puff into the lungs 2 times daily Reported on 4/7/2017 (Patient not taking: Reported on 12/22/2022)      mycophenolate (GENERIC EQUIVALENT) 250 MG capsule TAKE TWO CAPSULES BY MOUTH TWICE A  capsule 3    mycophenolate (GENERIC EQUIVALENT) 250 MG capsule Take 2 capsules (500 mg) by mouth 2 times daily 360 capsule 3    omeprazole (PRILOSEC) 20 MG CR capsule Take 1 capsule (20 mg) by mouth daily 90 capsule 3    predniSONE (DELTASONE) 5 MG tablet Take 1 tablet (5 mg) by mouth daily 90 tablet 3    PROGRAF (BRAND) 0.5 MG capsule HOLD (Patient not taking: Reported on 12/22/2022) 60 capsule 1    PROGRAF (BRAND) 1 MG capsule Take 2  "capsules (2 mg) by mouth 2 times daily Total dose = 2 mg twice a day (Patient not taking: Reported on 12/22/2022) 60 capsule 1    simvastatin (ZOCOR) 20 MG tablet TAKE ONE TABLET BY MOUTH EVERY MORNING 90 tablet 0    tacrolimus (GENERIC EQUIVALENT) 0.5 MG capsule Take 1 capsule (0.5 mg) by mouth 2 times daily Hold for dose change 180 capsule 3    tacrolimus (GENERIC EQUIVALENT) 1 MG capsule Take 1 capsule (1 mg) by mouth 2 times daily 180 capsule 3     No current facility-administered medications for this visit.         Physical Exams     Ht 1.651 m (5' 5\")   Wt 78.5 kg (173 lb)   BMI 28.79 kg/m    Wt Readings from Last 3 Encounters:   04/16/25 78.5 kg (173 lb)   03/13/24 78.9 kg (174 lb)   08/25/23 78 kg (172 lb)     General: NAD; H&N: no mucosal lesions; Lungs clear; Heart RRR; Abdomen; Soft, No organomegaly; Extremities: No edema; Skin: No rash; Neuro: Nonfocal; Mood/Affect: appropriate; Lymph: no LAD      Assessment and Plan     #MGUS  #H/o renal transplant 8/2007  #H/o PE 2015    Ms. 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 Her M-spike has waxed and waned, with a normal FLC ratio. Bone survey in 2021 was unremarkable, creatinine stable, no indication of anemia or bony pain. Bruising on her hands is stable. She has had occasional upper respiratory infections, but not requiring intervention. Kidney function stable.     Plan to continue monitoring MGUS -- labs for this can be done twice yearly. We will also recheck vitamin B12 with her next set of labs. RTC in one year for follow up.     Santino Glass MD, PhD  Division of Hematology, Oncology, and Transplantation  [facility]    "

## 2025-04-16 NOTE — NURSING NOTE
Current patient location: Patient declined to provide     Is the patient currently in the state of [place]? YES    Visit mode: TELEPHONE    If the visit is dropped, the patient can be reconnected by:TELEPHONE VISIT: Phone number:   Telephone Information:   Mobile [phone]       Will anyone else be joining the visit? NO  (If patient encounters technical issues they should call [phone])    Are changes needed to the allergy or medication list? No    Are refills needed on medications prescribed by this physician? NO    Rooming Documentation:  Not applicable    Reason for visit: RICK[facility]

## 2025-04-16 NOTE — LETTER
4/16/2025      Eliana Jane  [address]  [place] [address]      Dear Colleague,    Thank you for referring your patient, Eliana Jane, to the [facility]. Please see a copy of my visit note below.    Virtual Visit Details    Type of service:  Telephone Visit   Phone call duration: 10 minutes   Originating Location (pt. Location): Home    Distant Location (provider location):  On-site  Telephone visit completed due to the patient did not have access to video, while the distant provider did.      HEMATOLOGY AND ONCOLOGY  [facility]  FOLLOW-UP VISIT    NAME: Eliana Jane DEBORAH: Apr 16, 2025   MRN: [id number]      DIAGNOSES:  # MGUS      Oncology History   #MGUS  Previously seen by Dr. Lamb at [facility].   9/2021: two M protein detected 0.5g and 0.1g. UPEP with    1. Monoclonal IgG immunoglobulin of lambda light chain type   2. Monoclonal IgA immunoglobulin of kappa light chain type   Light chains: Kappa 2.43, Lambda 1.83, K/L ratio 1.83   Bone survey 11/2021: No lytic or sclerotic lesions are evident       Interval History     69 year old F with PMH of renal transplant 8/16/2007 on immunosuppression, h/o PE 2015, HTN, asthma, esophageal reflux, hypothyroidism, T2IDDM, and MGUS.     The pt states that she had a h/o renal transplant in 2007. She states that the cause of her kidney failure is unknown. She takes prednisone 5mg daily and tacro BID as 2mg/1.5mg, and cellcept 250mg.     She states that she has had easy bruising. She c/o bruising that she would notice if she knock her hands against something. This was addressed by Dr. Case previously and her coags test were checked (INR, APTT, fibrinoge), which were all WNL.her bruise can be as large as one inch in size. No nosebleeds. No black/tarry stools.     No new pain, no bone pain. No fever, no night sweats. She is getting over a upper respiratory infection, but has not needed medication  for it; no longer coughing. The patient does not report any SOB, CP, abd pain/n/v/d, dysuria, joint pain, rash.    She has a FH of leukemia in her maternal grandmother.     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     ROS: Negative other than as stated in above interval history.      Current Outpatient Medications   Medication Sig Dispense Refill     Albuterol (VENTOLIN IN) Inhale 1 Inhaler into the lungs daily as needed. (Patient not taking: Reported on 8/25/2023)       cholecalciferol (VITAMIN D3) 25 mcg (1000 units) capsule Take 1 capsule (25 mcg) by mouth daily 90 capsule 3     citalopram (CELEXA) 20 MG tablet Take 2 tablets (40 mg) by mouth daily       empagliflozin (JARDIANCE) 10 MG TABS tablet Take 1 tablet (10 mg) by mouth daily (Patient not taking: Reported on 3/13/2024)       fluticasone (VERAMYST) 27.5 MCG/SPRAY spray Spray 2 sprays into both nostrils daily       Fluticasone-Salmeterol (ADVAIR DISKUS IN) Inhale 2 Inhalers into the lungs daily as needed. (Patient not taking: Reported on 8/25/2023)       glimepiride (AMARYL) 1 MG tablet Take 3 tablets (3 mg) by mouth every morning (before breakfast)       LANTUS SOLOSTAR 100 UNIT/ML soln ADMINISTER 28 UNITS UNDER THE SKIN EVERY NIGHT AT BEDTIME       levothyroxine (SYNTHROID/LEVOTHROID) 50 MCG tablet Take 1 tablet (50 mcg) by mouth daily       mometasone (NASONEX) 50 MCG/ACT nasal spray Spray 2 sprays into both nostrils daily Reported on 4/7/2017 (Patient not taking: Reported on 12/22/2022)       mometasone-formoterol (DULERA) 200-5 MCG/ACT inhaler Inhale 1 puff into the lungs 2 times daily Reported on 4/7/2017 (Patient not taking: Reported on 12/22/2022)       mycophenolate (GENERIC EQUIVALENT) 250 MG capsule TAKE TWO CAPSULES BY MOUTH TWICE A  capsule 3     mycophenolate (GENERIC EQUIVALENT) 250 MG capsule Take 2 capsules (500 mg) by mouth 2 times daily 360 capsule 3     omeprazole (PRILOSEC) 20 MG CR capsule Take  "1 capsule (20 mg) by mouth daily 90 capsule 3     predniSONE (DELTASONE) 5 MG tablet Take 1 tablet (5 mg) by mouth daily 90 tablet 3     PROGRAF (BRAND) 0.5 MG capsule HOLD (Patient not taking: Reported on 12/22/2022) 60 capsule 1     PROGRAF (BRAND) 1 MG capsule Take 2 capsules (2 mg) by mouth 2 times daily Total dose = 2 mg twice a day (Patient not taking: Reported on 12/22/2022) 60 capsule 1     simvastatin (ZOCOR) 20 MG tablet TAKE ONE TABLET BY MOUTH EVERY MORNING 90 tablet 0     tacrolimus (GENERIC EQUIVALENT) 0.5 MG capsule Take 1 capsule (0.5 mg) by mouth 2 times daily Hold for dose change 180 capsule 3     tacrolimus (GENERIC EQUIVALENT) 1 MG capsule Take 1 capsule (1 mg) by mouth 2 times daily 180 capsule 3     No current facility-administered medications for this visit.         Physical Exams     Ht 1.651 m (5' 5\")   Wt 78.5 kg (173 lb)   BMI 28.79 kg/m    Wt Readings from Last 3 Encounters:   04/16/25 78.5 kg (173 lb)   03/13/24 78.9 kg (174 lb)   08/25/23 78 kg (172 lb)     General: NAD; H&N: no mucosal lesions; Lungs clear; Heart RRR; Abdomen; Soft, No organomegaly; Extremities: No edema; Skin: No rash; Neuro: Nonfocal; Mood/Affect: appropriate; Lymph: no LAD      Assessment and Plan     #MGUS  #H/o renal transplant 8/2007  #H/o PE 2015    Ms. 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 Her M-spike has waxed and waned, with a normal FLC ratio. Bone survey in 2021 was unremarkable, creatinine stable, no indication of anemia or bony pain. Bruising on her hands is stable. She has had occasional upper respiratory infections, but not requiring intervention. Kidney function stable.     Plan to continue monitoring MGUS -- labs for this can be done twice yearly. We will also recheck vitamin B12 with her next set of labs. RTC in one year for follow up.     Santino Glass, " MD, PhD  Division of Hematology, Oncology, and Transplantation  [facility]      Again, thank you for allowing me to participate in the care of your patient.        Sincerely,        Santino Glass MD    Electronically signed

## 2025-06-09 DIAGNOSIS — Z94.0 KIDNEY TRANSPLANTED: ICD-10-CM

## 2025-06-10 RX ORDER — TACROLIMUS 1 MG/1
1 CAPSULE ORAL 2 TIMES DAILY
Qty: 180 CAPSULE | Refills: 3 | Status: SHIPPED | OUTPATIENT
Start: 2025-06-10

## 2025-08-19 ENCOUNTER — TELEPHONE (OUTPATIENT)
Dept: TRANSPLANT | Facility: CLINIC | Age: 70
End: 2025-08-19
Payer: COMMERCIAL

## 2025-08-20 ENCOUNTER — TELEPHONE (OUTPATIENT)
Dept: TRANSPLANT | Facility: CLINIC | Age: 70
End: 2025-08-20
Payer: COMMERCIAL